# Patient Record
Sex: FEMALE | Race: WHITE | NOT HISPANIC OR LATINO | Employment: OTHER | URBAN - METROPOLITAN AREA
[De-identification: names, ages, dates, MRNs, and addresses within clinical notes are randomized per-mention and may not be internally consistent; named-entity substitution may affect disease eponyms.]

---

## 2017-01-13 ENCOUNTER — TRANSCRIBE ORDERS (OUTPATIENT)
Dept: ADMINISTRATIVE | Facility: HOSPITAL | Age: 82
End: 2017-01-13

## 2017-01-13 ENCOUNTER — HOSPITAL ENCOUNTER (OUTPATIENT)
Dept: GENERAL RADIOLOGY | Facility: HOSPITAL | Age: 82
Discharge: HOME | End: 2017-01-13
Attending: INTERNAL MEDICINE | Admitting: INTERNAL MEDICINE

## 2017-01-13 DIAGNOSIS — R05.9 COUGH: Primary | ICD-10-CM

## 2017-01-13 DIAGNOSIS — R05.9 COUGH: ICD-10-CM

## 2017-01-13 PROCEDURE — 71020 HC CHEST PA AND LATERAL: CPT

## 2017-01-19 ENCOUNTER — TRANSCRIBE ORDERS (OUTPATIENT)
Dept: ADMINISTRATIVE | Facility: HOSPITAL | Age: 82
End: 2017-01-19

## 2017-01-19 DIAGNOSIS — R91.1 LUNG NODULE: Primary | ICD-10-CM

## 2017-01-25 ENCOUNTER — APPOINTMENT (OUTPATIENT)
Dept: CT IMAGING | Facility: HOSPITAL | Age: 82
End: 2017-01-25
Attending: INTERNAL MEDICINE

## 2017-02-15 ENCOUNTER — HOSPITAL ENCOUNTER (OUTPATIENT)
Dept: CT IMAGING | Facility: HOSPITAL | Age: 82
Discharge: HOME | End: 2017-02-15
Attending: INTERNAL MEDICINE | Admitting: INTERNAL MEDICINE

## 2017-02-15 DIAGNOSIS — R91.1 LUNG NODULE: ICD-10-CM

## 2017-02-15 PROCEDURE — 71270 CT THORAX DX C-/C+: CPT

## 2017-02-15 PROCEDURE — 82565 ASSAY OF CREATININE: CPT

## 2017-02-15 PROCEDURE — 0 IOPAMIDOL 61 % SOLUTION: Performed by: INTERNAL MEDICINE

## 2017-02-15 RX ADMIN — IOPAMIDOL 75 ML: 612 INJECTION, SOLUTION INTRAVENOUS at 13:50

## 2017-02-16 LAB — CREAT BLDA-MCNC: 0.6 MG/DL (ref 0.6–1.3)

## 2018-04-20 ENCOUNTER — APPOINTMENT (OUTPATIENT)
Dept: GENERAL RADIOLOGY | Facility: HOSPITAL | Age: 83
End: 2018-04-20

## 2018-04-20 ENCOUNTER — HOSPITAL ENCOUNTER (EMERGENCY)
Facility: HOSPITAL | Age: 83
Discharge: HOME | End: 2018-04-20
Attending: EMERGENCY MEDICINE | Admitting: EMERGENCY MEDICINE

## 2018-04-20 VITALS
RESPIRATION RATE: 18 BRPM | HEART RATE: 75 BPM | TEMPERATURE: 97.7 F | BODY MASS INDEX: 18.78 KG/M2 | SYSTOLIC BLOOD PRESSURE: 160 MMHG | WEIGHT: 110 LBS | HEIGHT: 64 IN | DIASTOLIC BLOOD PRESSURE: 86 MMHG | OXYGEN SATURATION: 100 %

## 2018-04-20 DIAGNOSIS — R10.10 PAIN OF UPPER ABDOMEN: Primary | ICD-10-CM

## 2018-04-20 DIAGNOSIS — R10.13 DYSPEPSIA: ICD-10-CM

## 2018-04-20 LAB
ALBUMIN SERPL-MCNC: 4.1 G/DL (ref 3.2–4.8)
ALBUMIN/GLOB SERPL: 1.4 G/DL (ref 1.5–2.5)
ALP SERPL-CCNC: 61 U/L (ref 25–100)
ALT SERPL W P-5'-P-CCNC: 11 U/L (ref 7–40)
ANION GAP SERPL CALCULATED.3IONS-SCNC: 6 MMOL/L (ref 3–11)
AST SERPL-CCNC: 21 U/L (ref 0–33)
BASOPHILS # BLD AUTO: 0.04 10*3/MM3 (ref 0–0.2)
BASOPHILS NFR BLD AUTO: 0.5 % (ref 0–1)
BILIRUB SERPL-MCNC: 0.5 MG/DL (ref 0.3–1.2)
BNP SERPL-MCNC: 76 PG/ML (ref 0–100)
BUN BLD-MCNC: 17 MG/DL (ref 9–23)
BUN/CREAT SERPL: 21.3 (ref 7–25)
CALCIUM SPEC-SCNC: 9 MG/DL (ref 8.7–10.4)
CHLORIDE SERPL-SCNC: 96 MMOL/L (ref 99–109)
CO2 SERPL-SCNC: 28 MMOL/L (ref 20–31)
CREAT BLD-MCNC: 0.8 MG/DL (ref 0.6–1.3)
DEPRECATED RDW RBC AUTO: 45.4 FL (ref 37–54)
DEVELOPER EXPIRATION DATE: NORMAL
DEVELOPER LOT NUMBER: NORMAL
EOSINOPHIL # BLD AUTO: 0.39 10*3/MM3 (ref 0–0.3)
EOSINOPHIL NFR BLD AUTO: 4.9 % (ref 0–3)
ERYTHROCYTE [DISTWIDTH] IN BLOOD BY AUTOMATED COUNT: 13.5 % (ref 11.3–14.5)
EXPIRATION DATE: NORMAL
FECAL OCCULT BLOOD SCREEN, POC: NEGATIVE
GFR SERPL CREATININE-BSD FRML MDRD: 67 ML/MIN/1.73
GLOBULIN UR ELPH-MCNC: 3 GM/DL
GLUCOSE BLD-MCNC: 100 MG/DL (ref 70–100)
HCT VFR BLD AUTO: 39.6 % (ref 34.5–44)
HGB BLD-MCNC: 13.2 G/DL (ref 11.5–15.5)
HOLD SPECIMEN: NORMAL
HOLD SPECIMEN: NORMAL
IMM GRANULOCYTES # BLD: 0.03 10*3/MM3 (ref 0–0.03)
IMM GRANULOCYTES NFR BLD: 0.4 % (ref 0–0.6)
LIPASE SERPL-CCNC: 28 U/L (ref 6–51)
LYMPHOCYTES # BLD AUTO: 2.3 10*3/MM3 (ref 0.6–4.8)
LYMPHOCYTES NFR BLD AUTO: 28.9 % (ref 24–44)
Lab: NORMAL
MCH RBC QN AUTO: 30.7 PG (ref 27–31)
MCHC RBC AUTO-ENTMCNC: 33.3 G/DL (ref 32–36)
MCV RBC AUTO: 92.1 FL (ref 80–99)
MONOCYTES # BLD AUTO: 0.9 10*3/MM3 (ref 0–1)
MONOCYTES NFR BLD AUTO: 11.3 % (ref 0–12)
NEGATIVE CONTROL: NEGATIVE
NEUTROPHILS # BLD AUTO: 4.32 10*3/MM3 (ref 1.5–8.3)
NEUTROPHILS NFR BLD AUTO: 54.4 % (ref 41–71)
PLATELET # BLD AUTO: 235 10*3/MM3 (ref 150–450)
PMV BLD AUTO: 10 FL (ref 6–12)
POSITIVE CONTROL: POSITIVE
POTASSIUM BLD-SCNC: 4 MMOL/L (ref 3.5–5.5)
PROT SERPL-MCNC: 7.1 G/DL (ref 5.7–8.2)
RBC # BLD AUTO: 4.3 10*6/MM3 (ref 3.89–5.14)
SODIUM BLD-SCNC: 130 MMOL/L (ref 132–146)
TROPONIN I SERPL-MCNC: 0 NG/ML (ref 0–0.07)
WBC NRBC COR # BLD: 7.95 10*3/MM3 (ref 3.5–10.8)
WHOLE BLOOD HOLD SPECIMEN: NORMAL
WHOLE BLOOD HOLD SPECIMEN: NORMAL

## 2018-04-20 PROCEDURE — 85025 COMPLETE CBC W/AUTO DIFF WBC: CPT

## 2018-04-20 PROCEDURE — 82270 OCCULT BLOOD FECES: CPT | Performed by: EMERGENCY MEDICINE

## 2018-04-20 PROCEDURE — 93005 ELECTROCARDIOGRAM TRACING: CPT

## 2018-04-20 PROCEDURE — 71045 X-RAY EXAM CHEST 1 VIEW: CPT

## 2018-04-20 PROCEDURE — 83880 ASSAY OF NATRIURETIC PEPTIDE: CPT

## 2018-04-20 PROCEDURE — 84484 ASSAY OF TROPONIN QUANT: CPT

## 2018-04-20 PROCEDURE — 80053 COMPREHEN METABOLIC PANEL: CPT

## 2018-04-20 PROCEDURE — 93005 ELECTROCARDIOGRAM TRACING: CPT | Performed by: EMERGENCY MEDICINE

## 2018-04-20 PROCEDURE — 83690 ASSAY OF LIPASE: CPT

## 2018-04-20 PROCEDURE — 99284 EMERGENCY DEPT VISIT MOD MDM: CPT

## 2018-04-20 RX ORDER — OMEPRAZOLE 20 MG/1
20 CAPSULE, DELAYED RELEASE ORAL DAILY
COMMUNITY
End: 2019-03-27

## 2018-04-20 RX ORDER — ASPIRIN 81 MG/1
324 TABLET, CHEWABLE ORAL ONCE
Status: DISCONTINUED | OUTPATIENT
Start: 2018-04-20 | End: 2018-04-20

## 2018-04-20 RX ORDER — SODIUM CHLORIDE 0.9 % (FLUSH) 0.9 %
10 SYRINGE (ML) INJECTION AS NEEDED
Status: DISCONTINUED | OUTPATIENT
Start: 2018-04-20 | End: 2018-04-20 | Stop reason: HOSPADM

## 2018-04-20 RX ORDER — SUCRALFATE ORAL 1 G/10ML
1 SUSPENSION ORAL
Qty: 420 ML | Refills: 0 | Status: SHIPPED | OUTPATIENT
Start: 2018-04-20 | End: 2019-03-27

## 2018-04-20 RX ORDER — MELOXICAM 7.5 MG/1
7.5 TABLET ORAL 2 TIMES DAILY
COMMUNITY
End: 2018-04-20

## 2018-04-20 RX ORDER — GABAPENTIN 100 MG/1
200 CAPSULE ORAL NIGHTLY
COMMUNITY
End: 2019-03-05 | Stop reason: SDUPTHER

## 2018-04-20 RX ORDER — ONDANSETRON 4 MG/1
4 TABLET, ORALLY DISINTEGRATING ORAL EVERY 8 HOURS PRN
Qty: 12 TABLET | Refills: 0 | Status: SHIPPED | OUTPATIENT
Start: 2018-04-20 | End: 2019-03-27

## 2018-04-20 NOTE — ED PROVIDER NOTES
"Subjective   Darline Reed is a 89 y.o.female who presents to the emergency department with complaints of chest pain. Her last heart catheterization was \"years ago\" but was reportedly normal. Her cardiologist is Dr. Lakhani.     The patient states that she started feeling nauseated about an hour after eating breakfast at 0730 this morning. She vomited twice and since that time has had chest pain, epigastric abdominal pain, and intermittent episodes of nausea. The patient has a history of peptic ulcers and was seen by her PCP last week for abdominal issues and dark stools. She is not taking iron supplements or Pepto bismol.     There are no other acute complaints at this time.         History provided by:  Patient and relative  Chest Pain   Pain location:  Unable to specify  Pain quality: aching    Pain radiates to:  Does not radiate  Pain severity:  Unable to specify  Onset quality:  Unable to specify  Duration:  1 day  Timing:  Constant  Progression:  Unchanged  Chronicity:  New  Relieved by:  None tried  Worsened by:  Nothing  Ineffective treatments:  None tried  Associated symptoms: abdominal pain, nausea and vomiting    Abdominal pain:     Location:  Epigastric    Duration:  1 day    Timing:  Unable to specify    Progression:  Unable to specify    Chronicity:  New  Nausea:     Severity:  Unable to specify    Onset quality:  Unable to specify    Duration:  1 day    Timing:  Intermittent    Progression:  Unchanged  Vomiting:     Number of occurrences:  2    Severity:  Unable to specify    Duration:  1 day    Timing:  Intermittent    Progression:  Resolved  Risk factors: high cholesterol and hypertension    Risk factors: no smoking        Review of Systems   Cardiovascular: Positive for chest pain.   Gastrointestinal: Positive for abdominal pain, nausea and vomiting.   All other systems reviewed and are negative.      Past Medical History:   Diagnosis Date   • Arthritis    • Asthma    • COPD (chronic obstructive " pulmonary disease)    • Diverticulitis    • Dyspnea    • GERD (gastroesophageal reflux disease)    • Hypercholesteremia    • Hypertension 6/22/2016   • Hypothyroid    • Osteoporosis    • Scoliosis        Allergies   Allergen Reactions   • Codeine    • Penicillins        Past Surgical History:   Procedure Laterality Date   • CATARACT EXTRACTION     • COSMETIC SURGERY     • TONSILLECTOMY     • TONSILLECTOMY AND ADENOIDECTOMY         Family History   Problem Relation Age of Onset   • Coronary artery disease Mother    • Arthritis Mother    • Diabetes Mother    • Hyperlipidemia Mother    • Cardiomyopathy Son        Social History     Social History   • Marital status:      Social History Main Topics   • Smoking status: Never Smoker   • Smokeless tobacco: Never Used   • Alcohol use No   • Drug use: No   • Sexual activity: No      Comment:      Other Topics Concern   • Not on file         Objective   Physical Exam   Constitutional: She is oriented to person, place, and time. She appears well-developed and well-nourished. No distress.   HENT:   Head: Normocephalic and atraumatic.   Eyes: Conjunctivae are normal. No scleral icterus.   Neck: Normal range of motion. Neck supple.   Cardiovascular: Normal rate, regular rhythm and normal heart sounds.    No murmur heard.  Pulmonary/Chest: Effort normal and breath sounds normal. No respiratory distress.   Abdominal: Soft. Bowel sounds are normal. There is tenderness in the epigastric area and left upper quadrant. There is no rebound and no guarding.   Minimal tenderness in the LUQ and epigastric region.   Genitourinary: Rectal exam shows guaiac negative stool.   Genitourinary Comments: Brown colored guaiac negative stool in the rectal vault.   Musculoskeletal: She exhibits no edema.   Neurological: She is alert and oriented to person, place, and time.   Skin: Skin is warm and dry. No erythema.   Psychiatric: She has a normal mood and affect. Her behavior is normal.    Nursing note and vitals reviewed.      Procedures         ED Course  ED Course     Recent Results (from the past 24 hour(s))   Comprehensive Metabolic Panel    Collection Time: 04/20/18  3:35 PM   Result Value Ref Range    Glucose 100 70 - 100 mg/dL    BUN 17 9 - 23 mg/dL    Creatinine 0.80 0.60 - 1.30 mg/dL    Sodium 130 (L) 132 - 146 mmol/L    Potassium 4.0 3.5 - 5.5 mmol/L    Chloride 96 (L) 99 - 109 mmol/L    CO2 28.0 20.0 - 31.0 mmol/L    Calcium 9.0 8.7 - 10.4 mg/dL    Total Protein 7.1 5.7 - 8.2 g/dL    Albumin 4.10 3.20 - 4.80 g/dL    ALT (SGPT) 11 7 - 40 U/L    AST (SGOT) 21 0 - 33 U/L    Alkaline Phosphatase 61 25 - 100 U/L    Total Bilirubin 0.5 0.3 - 1.2 mg/dL    eGFR Non African Amer 67 >60 mL/min/1.73    Globulin 3.0 gm/dL    A/G Ratio 1.4 (L) 1.5 - 2.5 g/dL    BUN/Creatinine Ratio 21.3 7.0 - 25.0    Anion Gap 6.0 3.0 - 11.0 mmol/L   Lipase    Collection Time: 04/20/18  3:35 PM   Result Value Ref Range    Lipase 28 6 - 51 U/L   BNP    Collection Time: 04/20/18  3:35 PM   Result Value Ref Range    BNP 76.0 0.0 - 100.0 pg/mL   Light Blue Top    Collection Time: 04/20/18  3:35 PM   Result Value Ref Range    Extra Tube hold for add-on    Green Top (Gel)    Collection Time: 04/20/18  3:35 PM   Result Value Ref Range    Extra Tube Hold for add-ons.    Lavender Top    Collection Time: 04/20/18  3:35 PM   Result Value Ref Range    Extra Tube hold for add-on    Gold Top - SST    Collection Time: 04/20/18  3:35 PM   Result Value Ref Range    Extra Tube Hold for add-ons.    CBC Auto Differential    Collection Time: 04/20/18  3:35 PM   Result Value Ref Range    WBC 7.95 3.50 - 10.80 10*3/mm3    RBC 4.30 3.89 - 5.14 10*6/mm3    Hemoglobin 13.2 11.5 - 15.5 g/dL    Hematocrit 39.6 34.5 - 44.0 %    MCV 92.1 80.0 - 99.0 fL    MCH 30.7 27.0 - 31.0 pg    MCHC 33.3 32.0 - 36.0 g/dL    RDW 13.5 11.3 - 14.5 %    RDW-SD 45.4 37.0 - 54.0 fl    MPV 10.0 6.0 - 12.0 fL    Platelets 235 150 - 450 10*3/mm3    Neutrophil % 54.4  41.0 - 71.0 %    Lymphocyte % 28.9 24.0 - 44.0 %    Monocyte % 11.3 0.0 - 12.0 %    Eosinophil % 4.9 (H) 0.0 - 3.0 %    Basophil % 0.5 0.0 - 1.0 %    Immature Grans % 0.4 0.0 - 0.6 %    Neutrophils, Absolute 4.32 1.50 - 8.30 10*3/mm3    Lymphocytes, Absolute 2.30 0.60 - 4.80 10*3/mm3    Monocytes, Absolute 0.90 0.00 - 1.00 10*3/mm3    Eosinophils, Absolute 0.39 (H) 0.00 - 0.30 10*3/mm3    Basophils, Absolute 0.04 0.00 - 0.20 10*3/mm3    Immature Grans, Absolute 0.03 0.00 - 0.03 10*3/mm3   POC Troponin, Rapid    Collection Time: 04/20/18  3:38 PM   Result Value Ref Range    Troponin I 0.00 0.00 - 0.07 ng/mL   POCT Occult Blood, stool    Collection Time: 04/20/18  7:09 PM   Result Value Ref Range    Fecal Occult Blood Negative Negative    Lot Number [id number]     Expiration Date 3/20     DEVELOPER LOT NUMBER [id number]     DEVELOPER EXPIRATION DATE 5/2,020     Positive Control Positive Positive    Negative Control Negative Negative     Note: In addition to lab results from this visit, the labs listed above may include labs taken at another facility or during a different encounter within the last 24 hours. Please correlate lab times with ED admission and discharge times for further clarification of the services performed during this visit.    XR Chest 1 View   Preliminary Result   Chronic and emphysematous changes seen within the lung   fields with no evidence of acute parenchymal disease.       DICTATED:     04/20/2018   EDITED/ls :     04/20/2018                 Vitals:    04/20/18 1731 04/20/18 1813 04/20/18 1844 04/20/18 1927   BP: 160/86 160/86 155/68 160/86   BP Location:       Patient Position:       Pulse: 77 74 75 75   Resp:    18   Temp:       TempSrc:       SpO2: 100% 100% 98% 100%   Weight:       Height:         Medications - No data to display  ECG/EMG Results (last 24 hours)     Procedure Component Value Units Date/Time    ECG 12 Lead [[id number]] Collected:  04/20/18 1533     Updated:  04/20/18 1837                        MDM  Number of Diagnoses or Management Options  Dyspepsia: new and requires workup  Pain of upper abdomen: new and requires workup     Amount and/or Complexity of Data Reviewed  Clinical lab tests: reviewed and ordered  Tests in the radiology section of CPT®: reviewed and ordered  Tests in the medicine section of CPT®: ordered and reviewed  Discuss the patient with other providers: yes    Patient Progress  Patient progress: stable      Final diagnoses:   Pain of upper abdomen   Dyspepsia       Documentation assistance provided by eugene Espinosa.  Information recorded by the scribe was done at my direction and has been verified and validated by me.     Jaclyn Espinosa  04/20/18 1915       MESFIN Barnes  04/21/18 0797

## 2019-03-04 RX ORDER — GABAPENTIN 100 MG/1
CAPSULE ORAL
Qty: 180 CAPSULE | Refills: 1 | OUTPATIENT
Start: 2019-03-04

## 2019-03-05 RX ORDER — GABAPENTIN 100 MG/1
200 CAPSULE ORAL NIGHTLY
Qty: 44 CAPSULE | Refills: 0 | Status: SHIPPED | OUTPATIENT
Start: 2019-03-05 | End: 2019-03-27 | Stop reason: SDUPTHER

## 2019-03-05 NOTE — TELEPHONE ENCOUNTER
Last seen:7/26/18  Last filled:7/26/18  Next appt:3/27/19  JAIRO:pending    Her daughter called stating that her mother will be out of gabapentin by tomorrow and wanted to know if you would prescribe her enough to get her by until her appt that is on 3/27/19 so I have just put enough in for that  22 days x 2   44 pills

## 2019-03-06 RX ORDER — MELOXICAM 7.5 MG/1
TABLET ORAL
Qty: 90 TABLET | Refills: 0 | Status: SHIPPED | OUTPATIENT
Start: 2019-03-06 | End: 2019-03-27 | Stop reason: SDUPTHER

## 2019-03-19 PROBLEM — R49.0 HOARSENESS: Status: ACTIVE | Noted: 2019-03-19

## 2019-03-19 PROBLEM — K57.30 DIVERTICULOSIS OF COLON WITHOUT HEMORRHAGE: Status: ACTIVE | Noted: 2019-03-19

## 2019-03-19 PROBLEM — R06.09 DYSPNEA ON EXERTION: Status: ACTIVE | Noted: 2019-03-19

## 2019-03-19 PROBLEM — G44.311 INTRACTABLE ACUTE POST-TRAUMATIC HEADACHE: Status: ACTIVE | Noted: 2019-03-19

## 2019-03-19 PROBLEM — K59.01 CONSTIPATION BY DELAYED COLONIC TRANSIT: Status: ACTIVE | Noted: 2019-03-19

## 2019-03-19 PROBLEM — G43.909 MIGRAINE: Status: ACTIVE | Noted: 2019-03-19

## 2019-03-19 PROBLEM — M25.569 PAIN, KNEE: Status: ACTIVE | Noted: 2019-03-19

## 2019-03-19 PROBLEM — N39.41 URGE INCONTINENCE OF URINE: Status: ACTIVE | Noted: 2019-03-19

## 2019-03-19 PROBLEM — R64 CACHEXIA (HCC): Status: ACTIVE | Noted: 2019-03-19

## 2019-03-19 PROBLEM — R41.3 MEMORY LOSS: Status: ACTIVE | Noted: 2019-03-19

## 2019-03-19 PROBLEM — M81.0 SENILE OSTEOPOROSIS: Status: ACTIVE | Noted: 2019-03-19

## 2019-03-19 PROBLEM — M54.6 CHRONIC BILATERAL THORACIC BACK PAIN: Status: ACTIVE | Noted: 2019-03-19

## 2019-03-19 PROBLEM — E03.9 HYPOTHYROIDISM (ACQUIRED): Status: ACTIVE | Noted: 2019-03-19

## 2019-03-19 PROBLEM — G89.29 CHRONIC BILATERAL LOW BACK PAIN WITH LEFT-SIDED SCIATICA: Status: ACTIVE | Noted: 2019-03-19

## 2019-03-19 PROBLEM — M41.20 SCOLIOSIS (AND KYPHOSCOLIOSIS), IDIOPATHIC: Status: ACTIVE | Noted: 2019-03-19

## 2019-03-19 PROBLEM — G89.29 CHRONIC BILATERAL THORACIC BACK PAIN: Status: ACTIVE | Noted: 2019-03-19

## 2019-03-19 PROBLEM — I10 BENIGN ESSENTIAL HYPERTENSION: Status: ACTIVE | Noted: 2019-03-19

## 2019-03-19 PROBLEM — K21.9 GASTROESOPHAGEAL REFLUX DISEASE WITHOUT ESOPHAGITIS: Status: ACTIVE | Noted: 2019-03-19

## 2019-03-19 PROBLEM — E78.00 HYPERCHOLESTEREMIA: Status: ACTIVE | Noted: 2019-03-19

## 2019-03-19 PROBLEM — R29.6 FALLS FREQUENTLY: Status: ACTIVE | Noted: 2019-03-19

## 2019-03-19 PROBLEM — Z91.81 AT HIGH RISK FOR FALLS: Status: ACTIVE | Noted: 2019-03-19

## 2019-03-19 PROBLEM — J44.9 COPD (CHRONIC OBSTRUCTIVE PULMONARY DISEASE) WITH CHRONIC BRONCHITIS (HCC): Status: ACTIVE | Noted: 2019-03-19

## 2019-03-19 PROBLEM — M54.42 CHRONIC BILATERAL LOW BACK PAIN WITH LEFT-SIDED SCIATICA: Status: ACTIVE | Noted: 2019-03-19

## 2019-03-19 PROBLEM — F32.0 MILD MAJOR DEPRESSION, SINGLE EPISODE (HCC): Status: ACTIVE | Noted: 2019-03-19

## 2019-03-27 ENCOUNTER — OFFICE VISIT (OUTPATIENT)
Dept: INTERNAL MEDICINE | Facility: CLINIC | Age: 84
End: 2019-03-27

## 2019-03-27 VITALS
DIASTOLIC BLOOD PRESSURE: 68 MMHG | HEIGHT: 64 IN | SYSTOLIC BLOOD PRESSURE: 110 MMHG | HEART RATE: 60 BPM | BODY MASS INDEX: 19.46 KG/M2 | WEIGHT: 114 LBS

## 2019-03-27 DIAGNOSIS — J44.9 COPD (CHRONIC OBSTRUCTIVE PULMONARY DISEASE) WITH CHRONIC BRONCHITIS (HCC): ICD-10-CM

## 2019-03-27 DIAGNOSIS — E55.9 VITAMIN D DEFICIENCY: ICD-10-CM

## 2019-03-27 DIAGNOSIS — I10 BENIGN ESSENTIAL HYPERTENSION: ICD-10-CM

## 2019-03-27 DIAGNOSIS — E03.9 HYPOTHYROIDISM (ACQUIRED): ICD-10-CM

## 2019-03-27 DIAGNOSIS — Z91.81 AT HIGH RISK FOR FALLS: ICD-10-CM

## 2019-03-27 DIAGNOSIS — Z00.00 MEDICARE ANNUAL WELLNESS VISIT, SUBSEQUENT: Primary | ICD-10-CM

## 2019-03-27 DIAGNOSIS — R29.6 FALLS FREQUENTLY: ICD-10-CM

## 2019-03-27 DIAGNOSIS — R53.1 GENERALIZED WEAKNESS: ICD-10-CM

## 2019-03-27 DIAGNOSIS — E78.00 HYPERCHOLESTEREMIA: ICD-10-CM

## 2019-03-27 DIAGNOSIS — G89.29 CHRONIC RIGHT-SIDED LOW BACK PAIN WITH RIGHT-SIDED SCIATICA: Chronic | ICD-10-CM

## 2019-03-27 DIAGNOSIS — R41.3 MEMORY LOSS: ICD-10-CM

## 2019-03-27 DIAGNOSIS — F32.0 MILD MAJOR DEPRESSION, SINGLE EPISODE (HCC): ICD-10-CM

## 2019-03-27 DIAGNOSIS — K59.01 CONSTIPATION BY DELAYED COLONIC TRANSIT: ICD-10-CM

## 2019-03-27 DIAGNOSIS — M54.41 CHRONIC RIGHT-SIDED LOW BACK PAIN WITH RIGHT-SIDED SCIATICA: Chronic | ICD-10-CM

## 2019-03-27 PROBLEM — R64 CACHEXIA (HCC): Status: RESOLVED | Noted: 2019-03-19 | Resolved: 2019-03-27

## 2019-03-27 PROCEDURE — G0439 PPPS, SUBSEQ VISIT: HCPCS | Performed by: INTERNAL MEDICINE

## 2019-03-27 RX ORDER — LEVOTHYROXINE SODIUM 0.1 MG/1
100 TABLET ORAL DAILY
Qty: 90 TABLET | Refills: 1 | Status: SHIPPED | OUTPATIENT
Start: 2019-03-27 | End: 2019-10-11 | Stop reason: SDUPTHER

## 2019-03-27 RX ORDER — MV-MIN/FA/VIT K/LUTEIN/ZEAXANT 200MCG-5MG
CAPSULE ORAL
COMMUNITY
Start: 2018-07-26

## 2019-03-27 RX ORDER — ACETAMINOPHEN 160 MG
TABLET,DISINTEGRATING ORAL
COMMUNITY
Start: 2011-10-04

## 2019-03-27 RX ORDER — DONEPEZIL HYDROCHLORIDE 5 MG/1
5 TABLET, FILM COATED ORAL NIGHTLY
Qty: 90 TABLET | Refills: 1 | Status: SHIPPED | OUTPATIENT
Start: 2019-03-27 | End: 2019-10-11 | Stop reason: SDUPTHER

## 2019-03-27 RX ORDER — CALCIUM CARBONATE 260MG(650)
TABLET,CHEWABLE ORAL
COMMUNITY
Start: 2018-07-26

## 2019-03-27 RX ORDER — MELOXICAM 7.5 MG/1
7.5 TABLET ORAL DAILY
Qty: 90 TABLET | Refills: 1 | Status: SHIPPED | OUTPATIENT
Start: 2019-03-27 | End: 2019-10-11 | Stop reason: SDUPTHER

## 2019-03-27 RX ORDER — SOLIFENACIN SUCCINATE 10 MG/1
20 TABLET, FILM COATED ORAL DAILY
Qty: 180 TABLET | Refills: 1 | Status: SHIPPED | OUTPATIENT
Start: 2019-03-27 | End: 2019-10-11 | Stop reason: SDUPTHER

## 2019-03-27 RX ORDER — OMEPRAZOLE 40 MG/1
40 CAPSULE, DELAYED RELEASE ORAL DAILY
Qty: 90 CAPSULE | Refills: 1 | Status: SHIPPED | OUTPATIENT
Start: 2019-03-27 | End: 2019-09-23 | Stop reason: SDUPTHER

## 2019-03-27 RX ORDER — GABAPENTIN 100 MG/1
200 CAPSULE ORAL NIGHTLY
Qty: 60 CAPSULE | Refills: 2 | Status: SHIPPED | OUTPATIENT
Start: 2019-03-27 | End: 2019-07-31 | Stop reason: SDUPTHER

## 2019-03-27 RX ORDER — OMEPRAZOLE 40 MG/1
CAPSULE, DELAYED RELEASE ORAL
COMMUNITY
Start: 2018-12-29 | End: 2019-03-27 | Stop reason: SDUPTHER

## 2019-03-27 RX ORDER — DONEPEZIL HYDROCHLORIDE 5 MG/1
TABLET, FILM COATED ORAL
COMMUNITY
Start: 2019-02-22 | End: 2019-03-27 | Stop reason: SDUPTHER

## 2019-03-27 RX ORDER — DOCUSATE SODIUM 250 MG
2 CAPSULE ORAL DAILY
COMMUNITY
Start: 2012-11-26

## 2019-03-27 RX ORDER — SOLIFENACIN SUCCINATE 10 MG/1
2 TABLET, FILM COATED ORAL DAILY
COMMUNITY
Start: 2019-01-09 | End: 2019-03-27 | Stop reason: SDUPTHER

## 2019-03-27 NOTE — PROGRESS NOTES
QUICK REFERENCE INFORMATION:  The ABCs of the Annual Wellness Visit    Subsequent Medicare Wellness Visit    HEALTH RISK ASSESSMENT    2/8/1926    Recent Hospitalizations:  No hospitalization(s) within the last year..        Current Medical Providers:  Patient Care Team:  Sandra Adkins MD as PCP - General  Sandra Adkins MD as PCP - Family Medicine  Rob Woods MD as Consulting Physician (Orthopedic Surgery)        Smoking Status:  Social History     Tobacco Use   Smoking Status Never Smoker   Smokeless Tobacco Never Used       Alcohol Consumption:  Social History     Substance and Sexual Activity   Alcohol Use No       Depression Screen:   PHQ-2/PHQ-9 Depression Screening 3/27/2019   Little interest or pleasure in doing things 1   Feeling down, depressed, or hopeless 1   Trouble falling or staying asleep, or sleeping too much 1   Feeling tired or having little energy 1   Poor appetite or overeating 0   Feeling bad about yourself - or that you are a failure or have let yourself or your family down 1   Trouble concentrating on things, such as reading the newspaper or watching television 2   Moving or speaking so slowly that other people could have noticed. Or the opposite - being so fidgety or restless that you have been moving around a lot more than usual 0   Thoughts that you would be better off dead, or of hurting yourself in some way 0   Total Score 7   If you checked off any problems, how difficult have these problems made it for you to do your work, take care of things at home, or get along with other people? Somewhat difficult       Health Habits and Functional and Cognitive Screening:  Functional & Cognitive Status 3/27/2019   Do you have difficulty preparing food and eating? No   Do you have difficulty bathing yourself, getting dressed or grooming yourself? No   Do you have difficulty using the toilet? No   Do you have difficulty moving around from place to place? No   Do you have trouble with  steps or getting out of a bed or a chair? Yes   In the past year have you fallen or experienced a near fall? Yes   Current Diet Well Balanced Diet   Dental Exam Up to date   Eye Exam Not up to date   Exercise (times per week) 0 times per week   Current Exercise Activities Include None   Do you need help using the phone?  No   Are you deaf or do you have serious difficulty hearing?  No   Do you need help with transportation? Yes   Do you need help shopping? Yes   Do you need help preparing meals?  No   Do you need help with housework?  Yes   Do you need help with laundry? No   Do you need help taking your medications? No   Do you need help managing money? No   Do you ever drive or ride in a car without wearing a seat belt? No   Have you felt unusual stress, anger or loneliness in the last month? Yes   Who do you live with? Alone   If you need help, do you have trouble finding someone available to you? No   Have you been bothered in the last four weeks by sexual problems? No           Does the patient have evidence of cognitive impairment? Yes    Aspirin use counseling: Contraindicated from taking ASA      Recent Lab Results:  CMP:  Lab Results   Component Value Date    BUN 17 04/20/2018    CREATININE 0.80 04/20/2018    EGFRIFNONA 67 04/20/2018    BCR 21.3 04/20/2018     (L) 04/20/2018    K 4.0 04/20/2018    CO2 28.0 04/20/2018    CALCIUM 9.0 04/20/2018    ALBUMIN 4.10 04/20/2018    BILITOT 0.5 04/20/2018    ALKPHOS 61 04/20/2018    AST 21 04/20/2018    ALT 11 04/20/2018     HbA1c:  No results found for: HGBA1C  Microalbumin:  No results found for: MICROALBUR, POCMALB, POCCREAT  Lipid Panel  Lab Results   Component Value Date    AST 21 04/20/2018    ALT 11 04/20/2018       Visual Acuity:  No exam data present    Age-appropriate Screening Schedule:  Refer to the list below for future screening recommendations based on patient's age, sex and/or medical conditions. Orders for these recommended tests are listed in  the plan section. The patient has been provided with a written plan.    89 y.o.  Age appropriate preventive counseling done including age appropriate vaccines,regular  and self breast exam,  regular dental visits, mental health, injury prevention such as wearing seat belt and preventing falls, healthy  nutrition, healthy weight, regular physical exercise. Alcohol use is none.  Tobacco history-none. Drug use-none.  STD's-not at risk.        Health Maintenance   Topic Date Due   • ZOSTER VACCINE (1 of 2) 02/08/1976   • PNEUMOCOCCAL VACCINES (65+ LOW/MEDIUM RISK) (2 of 2 - PCV13) 05/02/2015   • DXA SCAN  04/20/2018   • INFLUENZA VACCINE  08/01/2018   • LIPID PANEL  03/05/2019   • TDAP/TD VACCINES (2 - Td) 11/26/2022        Subjective   History of Present Illness    Darline Reed is a 89 y.o. female who presents for an Subsequent Wellness Visit and follow-up of chronic conditions including hypertension, hyperlipidemia, COPD, depression, memory loss, hypothyroidism, constipation.    CHRONIC CONDITIONS:    Blood pressures have been controlled at home.  Her daughter who is an RN watches her blood pressure.  It is very good today.    She does eat a low-fat diet.  Her appetite has been better over the last few months.  She eats 3 meals a day with some protein in each 1.  She does not exercise.  She is sedentary.    COPD is stable.  She uses Tl B inhaler regularly and feels that it has helped her a lot.  No cough or wheeze.  No increase in her baseline shortness of breath with exertion.    Depression is mild.  She does not think it is any worse than usual.  She feels the sertraline has helped her a lot.  She does feel down when she thinks about her inability to get out and drive and go places and when she thinks about her lack of ability to read books which she used to love to do.  She can no longer remember the story line to read a book and her site is declining as well..  She does read short stories and reads the  newspaper and does crossword puzzles and she is very happy about that.  She feels very estrella for the help that she does have and her family that comes and takes her places and brings her meals and helps take care of her.    Memory loss has declined some gradually over the last year.  She does forget things that she was told.  As stated she cannot remember the story line in a book.  She does sometimes lose things in her house.  She has never left the stove on or done anything that her family feels is dangerous.  She feels safe in her house and her family feels she is safe.    She is at high risk for falls.  She has general weakness.  She is on multiple medications.  She has had one fall over the last year in her bathroom but she did not hurt herself.  Her family installed side rails for the commode and she is now able to stand up from the commode without any other assistance.  She is able to stand up from a chair without arms on her own.    Hypothyroidism, she does take her medication regularly.  Her family does monitor her medications.    Constipation is well controlled on MiraLAX and stool softeners.    The chronic right-sided low back pain with right-sided sciatica is stable.  It is much better than it used to be.  She manages it by taking gabapentin every evening and meloxicam every morning with food.  She rarely ever needs a tramadol.  It does help her when she needs it.  She denies side effects.    The following portions of the patient's history were reviewed and updated as appropriate: allergies, current medications, past family history, past medical history, past social history, past surgical history and problem list.    Outpatient Medications Prior to Visit   Medication Sig Dispense Refill   • Cholecalciferol (VITAMIN D3) 2000 units capsule 1 capsule by mouth  daily     • Cyanocobalamin 5000 MCG tablet dispersible take 1 tablet daily     • docusate sodium (COLACE) 250 MG capsule Take 2 capsules by mouth  Daily.     • Magnesium Citrate 100 MG tablet take 1 tablet every evening     • Multiple Vitamins-Minerals (PRESERVISION AREDS 2+MULTI VIT) capsule Take one capsule by oral route once a day     • polyethylene glycol (MIRALAX) packet Take 17 g by mouth 2 (two) times a day for 30 days. 1020 g 5   • Selenium 200 MCG tablet take 1 tablet daily     • traMADol (ULTRAM) 50 MG tablet Take 1 tablet by mouth every 6 (six) hours as needed for moderate pain (4-6) for up to 30 days. 90 tablet 0   • donepezil (ARICEPT) 5 MG tablet      • gabapentin (NEURONTIN) 100 MG capsule Take 2 capsules by mouth Every Night. 44 capsule 0   • Glycopyrrolate-Formoterol (BEVESPI AEROSPHERE) 9-4.8 MCG/ACT aerosol Inhale.     • levothyroxine (SYNTHROID, LEVOTHROID) 100 MCG tablet Take 1 tablet by mouth daily.     • meloxicam (MOBIC) 7.5 MG tablet TAKE 1 TABLET DAILY 90 tablet 0   • omeprazole (priLOSEC) 40 MG capsule      • sertraline (ZOLOFT) 50 MG tablet Take 0.5 tablets by mouth daily.     • VESICARE 10 MG tablet Take 2 tablets by mouth Daily.     • azithromycin (ZITHROMAX Z-LAURO) 250 MG tablet Take 2 tablets the first day, then 1 tablet daily for 4 days. 6 tablet 0   • Cholecalciferol (VITAMIN D PO) Take 1 tablet by mouth daily.     • docusate sodium (DOK) 250 MG capsule Take 1 capsule by mouth 2 (two) times a day for 30 days. 60 capsule 5   • omeprazole (priLOSEC) 20 MG capsule Take 20 mg by mouth Daily.     • ondansetron ODT (ZOFRAN-ODT) 4 MG disintegrating tablet Take 1 tablet by mouth Every 8 (Eight) Hours As Needed for Nausea. 12 tablet 0   • sucralfate (CARAFATE) 1 GM/10ML suspension Take 10 mL by mouth 4 (Four) Times a Day With Meals & at Bedtime. 420 mL 0   • vitamin B-12 (CYANOCOBALAMIN) 1000 MCG tablet Take  by mouth.       No facility-administered medications prior to visit.        Patient Active Problem List   Diagnosis   • Intractable pain   • Hypokalemia   • Low back pain radiating to right leg   • Weight loss   • Chronic bilateral  thoracic back pain   • Dyspnea on exertion   • Hoarseness   • Pain, knee   • Migraine   • Falls frequently   • Chronic right-sided low back pain with right-sided sciatica   • Senile osteoporosis   • Diverticulosis of colon without hemorrhage   • Scoliosis (and kyphoscoliosis), idiopathic   • Memory loss   • Hypercholesteremia   • Urge incontinence of urine   • Benign essential hypertension   • Constipation by delayed colonic transit   • At high risk for falls   • Intractable acute post-traumatic headache   • Mild major depression, single episode (CMS/HCC)   • COPD (chronic obstructive pulmonary disease) with chronic bronchitis (CMS/HCC)   • Gastroesophageal reflux disease without esophagitis   • Hypothyroidism (acquired)       Advance Care Planning:  Patient has an advance directive - a copy has not been provided. Have asked the patient to send this to us to add to record    Identification of Risk Factors:  Risk factors include: Osteoprorosis risk identified;  Use Calcium and Vitamin D as directed, avoid caffeine, participate in weight bearing exercises for better bone health  Depression Screen Positive;   Patient advised to followup with her primary care provider   Dementia/Memory loss suspected due to abnormal cognition screening;  Patient advised to followup with her primary care provider and physical therapy  Fall Screen Questionaire is abnormal suggesting increased fall risk;   Referral to Physical Therapy for further evaluation of gait, strength and flexibility to prevent falls ordered   Participation in an exercise program for gait training, balance training and strength  Inactivity/Poor Exercise Habits-patient reported; Inactivity; Follow Exercise Information (additional patient information in the After Visit Summary)  Inadequate Social Support, Isolation, Loneliness, Lack of Transportation, Financial Difficulties, or Caregiver Stress suspected; physical therapy.    Review of Systems   Constitutional:  "Positive for fatigue. Negative for chills and fever.   HENT: Negative for congestion, ear pain and sinus pressure.    Respiratory: Positive for shortness of breath. Negative for cough, chest tightness and wheezing.         SOA on exertion   Cardiovascular: Negative for chest pain and palpitations.   Gastrointestinal: Negative for abdominal pain, blood in stool and constipation.   Skin: Negative for color change.   Allergic/Immunologic: Negative for environmental allergies.   Neurological: Negative for dizziness, speech difficulty and headaches.   Psychiatric/Behavioral: Negative for confusion. The patient is not nervous/anxious.        Compared to one year ago, the patient feels her physical health is worse.  Compared to one year ago, the patient feels her mental health is worse.    Objective     Physical Exam     Procedures     Vitals:    03/27/19 1035   BP: 110/68   BP Location: Right arm   Patient Position: Sitting   Cuff Size: Adult   Pulse: 60   Weight: 51.7 kg (114 lb)   Height: 162 cm (63.78\")       Patient's Body mass index is 19.7 kg/m². BMI is within normal parameters. No follow-up required..      Assessment/Plan   Patient Self-Management and Personalized Health Advice  The patient has been provided with information about: diet, exercise and fall prevention and preventive services including:   · Annual Wellness Visit (AWV)  · Depression Screening (15 minutes face to face, Code ).    Visit Diagnoses:    ICD-10-CM ICD-9-CM   1. Benign essential hypertension I10 401.1   2. Hypercholesteremia E78.00 272.0   3. Memory loss R41.3 780.93   4. Chronic right-sided low back pain with right-sided sciatica M54.41 724.2    G89.29 724.3     338.29   5. Generalized weakness R53.1 780.79   6. COPD (chronic obstructive pulmonary disease) with chronic bronchitis (CMS/HCC) J44.9 491.20   7. Mild major depression, single episode (CMS/HCC) F32.0 296.21   8. At high risk for falls Z91.81 V15.88   9. Hypothyroidism " (acquired) E03.9 244.9   10. Constipation by delayed colonic transit K59.01 564.01       Orders Placed This Encounter   Procedures   • Ambulatory Referral to Physical Therapy Evaluate and treat     Referral Priority:   Routine     Referral Type:   Therapy     Referral Reason:   Specialty Services Required     Referral Location:   [facility]     Requested Specialty:   Physical Therapy     Number of Visits Requested:   1       Outpatient Encounter Medications as of 3/27/2019   Medication Sig Dispense Refill   • Cholecalciferol (VITAMIN D3) 2000 units capsule 1 capsule by mouth  daily     • Cyanocobalamin 5000 MCG tablet dispersible take 1 tablet daily     • docusate sodium (COLACE) 250 MG capsule Take 2 capsules by mouth Daily.     • donepezil (ARICEPT) 5 MG tablet Take 1 tablet by mouth Every Night. 90 tablet 1   • gabapentin (NEURONTIN) 100 MG capsule Take 2 capsules by mouth Every Night. 60 capsule 2   • Glycopyrrolate-Formoterol (BEVESPI AEROSPHERE) 9-4.8 MCG/ACT aerosol Inhale 1 spray Daily. 3 inhaler 1   • levothyroxine (SYNTHROID, LEVOTHROID) 100 MCG tablet Take 1 tablet by mouth Daily. 90 tablet 1   • Magnesium Citrate 100 MG tablet take 1 tablet every evening     • meloxicam (MOBIC) 7.5 MG tablet Take 1 tablet by mouth Daily. 90 tablet 1   • Multiple Vitamins-Minerals (PRESERVISION AREDS 2+MULTI VIT) capsule Take one capsule by oral route once a day     • omeprazole (priLOSEC) 40 MG capsule Take 1 capsule by mouth Daily. 90 capsule 1   • polyethylene glycol (MIRALAX) packet Take 17 g by mouth 2 (two) times a day for 30 days. 1020 g 5   • Selenium 200 MCG tablet take 1 tablet daily     • sertraline (ZOLOFT) 50 MG tablet Take 0.5 tablets by mouth Daily. 46 tablet 1   • traMADol (ULTRAM) 50 MG tablet Take 1 tablet by mouth every 6 (six) hours as needed for moderate pain (4-6) for up to 30 days. 90 tablet 0   • VESICARE 10 MG tablet Take 2 tablets by mouth Daily. 180 tablet 1   • [DISCONTINUED]  donepezil (ARICEPT) 5 MG tablet      • [DISCONTINUED] gabapentin (NEURONTIN) 100 MG capsule Take 2 capsules by mouth Every Night. 44 capsule 0   • [DISCONTINUED] Glycopyrrolate-Formoterol (BEVESPI AEROSPHERE) 9-4.8 MCG/ACT aerosol Inhale.     • [DISCONTINUED] levothyroxine (SYNTHROID, LEVOTHROID) 100 MCG tablet Take 1 tablet by mouth daily.     • [DISCONTINUED] meloxicam (MOBIC) 7.5 MG tablet TAKE 1 TABLET DAILY 90 tablet 0   • [DISCONTINUED] omeprazole (priLOSEC) 40 MG capsule      • [DISCONTINUED] sertraline (ZOLOFT) 50 MG tablet Take 0.5 tablets by mouth daily.     • [DISCONTINUED] VESICARE 10 MG tablet Take 2 tablets by mouth Daily.     • [DISCONTINUED] azithromycin (ZITHROMAX Z-LAURO) 250 MG tablet Take 2 tablets the first day, then 1 tablet daily for 4 days. 6 tablet 0   • [DISCONTINUED] Cholecalciferol (VITAMIN D PO) Take 1 tablet by mouth daily.     • [DISCONTINUED] docusate sodium (DOK) 250 MG capsule Take 1 capsule by mouth 2 (two) times a day for 30 days. 60 capsule 5   • [DISCONTINUED] omeprazole (priLOSEC) 20 MG capsule Take 20 mg by mouth Daily.     • [DISCONTINUED] ondansetron ODT (ZOFRAN-ODT) 4 MG disintegrating tablet Take 1 tablet by mouth Every 8 (Eight) Hours As Needed for Nausea. 12 tablet 0   • [DISCONTINUED] sucralfate (CARAFATE) 1 GM/10ML suspension Take 10 mL by mouth 4 (Four) Times a Day With Meals & at Bedtime. 420 mL 0   • [DISCONTINUED] vitamin B-12 (CYANOCOBALAMIN) 1000 MCG tablet Take  by mouth.       No facility-administered encounter medications on file as of 3/27/2019.        Her blood pressures have been well controlled.  She will continue to eat a low-salt diet.    For hypercholesterolemia, continue to eat a low-fat and low sugar diet.  Try to get more physical activity at home.    For memory loss, continue reading and doing crossword puzzles.  Physical activity also stimulates brain activity.  Carrying on conversations with other people helps R memory.  Continue taking donepezil  daily.    For right-sided low back pain with right leg pain, try to do a few back stretches every day.  Use moist heat on the low back.  Heat on the thigh area may also help.  Continue taking meloxicam daily with food and continue taking gabapentin every evening.  May use tramadol as needed.  Watch for sedation or dizziness.  Start physical therapy at the [facility] on [place].    For general weakness, try to do a little more physical activity at home every day.  May do leg and arm exercises while seated watching TV.  Practice standing up and sitting back down in the chair and standing up again.  Continue getting some protein with each meal.  Start physical therapy.    For COPD, continue using Bevespi inhaler every day.  Doing a little physical activity every day also helps build up her lung function and stamina.    Risk for falls is decreased by regular exercise.  Doing physical therapy will help a lot.  Taking vitamin D also reduces the risk of falls.    For hypothyroidism, continue current thyroid replacement.  We will check labs one day this week.    For constipation, continue using MiraLAX daily and taking stool softeners.  Also drink plenty of fluids.      Fall Prevention in the Home, Adult  Falls can cause injuries and can affect people from all age groups. There are many simple things that you can do to make your home safe and to help prevent falls. Ask for help when making these changes, if needed.  What actions can I take to prevent falls?  General instructions  · Use good lighting in all rooms. Replace any light bulbs that burn out.  · Turn on lights if it is dark. Use night-lights.  · Place frequently used items in easy-to-reach places. Lower the shelves around your home if necessary.  · Set up furniture so that there are clear paths around it. Avoid moving your furniture around.  · Remove throw rugs and other tripping hazards from the floor.  · Avoid walking on wet floors.  · Fix any uneven floor  surfaces.  · Add color or contrast paint or tape to grab bars and handrails in your home. Place contrasting color strips on the first and last steps of stairways.  · When you use a stepladder, make sure that it is completely opened and that the sides are firmly locked. Have someone hold the ladder while you are using it. Do not climb a closed stepladder.  · Be aware of any and all pets.  What can I do in the bathroom?  · Keep the floor dry. Immediately clean up any water that spills onto the floor.  · Remove soap buildup in the tub or shower on a regular basis.  · Use non-skid mats or decals on the floor of the tub or shower.  · Attach bath mats securely with double-sided, non-slip rug tape.  · If you need to sit down while you are in the shower, use a plastic, non-slip stool.  · Install grab bars by the toilet and in the tub and shower. Do not use towel bars as grab bars.  What can I do in the bedroom?  · Make sure that a bedside light is easy to reach.  · Do not use oversized bedding that drapes onto the floor.  · Have a firm chair that has side arms to use for getting dressed.  What can I do in the kitchen?  · Clean up any spills right away.  · If you need to reach for something above you, use a sturdy step stool that has a grab bar.  · Keep electrical cables out of the way.  · Do not use floor polish or wax that makes floors slippery. If you must use wax, make sure that it is non-skid floor wax.  What can I do in the stairways?  · Do not leave any items on the stairs.  · Make sure that you have a light switch at the top of the stairs and the bottom of the stairs. Have them installed if you do not have them.  · Make sure that there are handrails on both sides of the stairs. Fix handrails that are broken or loose. Make sure that handrails are as long as the stairways.  · Install non-slip stair treads on all stairs in your home.  · Avoid having throw rugs at the top or bottom of stairways, or secure the rugs with  carpet tape to prevent them from moving.  · Choose a carpet design that does not hide the edge of steps on the stairway.  · Check any carpeting to make sure that it is firmly attached to the stairs. Fix any carpet that is loose or worn.  What can I do on the outside of my home?  · Use bright outdoor lighting.  · Regularly repair the edges of walkways and driveways and fix any cracks.  · Remove high doorway thresholds.  · Trim any shrubbery on the main path into your home.  · Regularly check that handrails are securely fastened and in good repair. Both sides of any steps should have handrails.  · Install guardrails along the edges of any raised decks or porches.  · Clear walkways of debris and clutter, including tools and rocks.  · Have leaves, snow, and ice cleared regularly.  · Use sand or salt on walkways during winter months.  · In the garage, clean up any spills right away, including grease or oil spills.  What other actions can I take?  · Wear closed-toe shoes that fit well and support your feet. Wear shoes that have rubber soles or low heels.  · Use mobility aids as needed, such as canes, walkers, scooters, and crutches.  · Review your medicines with your health care provider. Some medicines can cause dizziness or changes in blood pressure, which increase your risk of falling.  Talk with your health care provider about other ways that you can decrease your risk of falls. This may include working with a physical therapist or  to improve your strength, balance, and endurance.  Where to find more information  · Centers for Disease Control and Prevention, [place]: https://www.cdc.gov  · National [place] [employer] Aging: https://py3ncfp.chary.nih.gov  Contact a health care provider if:  · You are afraid of falling at home.  · You feel weak, drowsy, or dizzy at home.  · You fall at home.  Summary  · There are many simple things that you can do to make your home safe and to help prevent falls.  · Ways to make your  home safe include removing tripping hazards and installing grab bars in the bathroom.  · Ask for help when making these changes in your home.  This information is not intended to replace advice given to you by your health care provider. Make sure you discuss any questions you have with your health care provider.  Document Released: 12/08/2003 Document Revised: 08/02/2018 Document Reviewed: 08/02/2018  [employer] Interactive Patient Education © 2019 [employer]    Exercising to Stay Healthy  Exercising regularly is important. It has many health benefits, such as:  · Improving your overall fitness, flexibility, and endurance.  · Increasing your bone density.  · Helping with weight control.  · Decreasing your body fat.  · Increasing your muscle strength.  · Reducing stress and tension.  · Improving your overall health.    In order to become healthy and stay healthy, it is recommended that you do moderate-intensity and vigorous-intensity exercise. You can tell that you are exercising at a moderate intensity if you have a higher heart rate and faster breathing, but you are still able to hold a conversation. You can tell that you are exercising at a vigorous intensity if you are breathing much harder and faster and cannot hold a conversation while exercising.  How often should I exercise?  Choose an activity that you enjoy and set realistic goals. Your health care provider can help you to make an activity plan that works for you. Exercise regularly as directed by your health care provider. This may include:  · Doing resistance training twice each week, such as:  ? Push-ups.  ? Sit-ups.  ? Lifting weights.  ? Using resistance bands.  · Doing a given intensity of exercise for a given amount of time. Choose from these options:  ? 150 minutes of moderate-intensity exercise every week.  ? 75 minutes of vigorous-intensity exercise every week.  ? A mix of moderate-intensity and vigorous-intensity exercise every  week.    Children, pregnant women, people who are out of shape, people who are overweight, and older adults may need to consult a health care provider for individual recommendations. If you have any sort of medical condition, be sure to consult your health care provider before starting a new exercise program.  What are some exercise ideas?  Some moderate-intensity exercise ideas include:  · Walking at a rate of 1 mile in 15 minutes.  · Biking.  · Hiking.  · Golfing.  · Dancing.    Some vigorous-intensity exercise ideas include:  · Walking at a rate of at least 4.5 miles per hour.  · Jogging or running at a rate of 5 miles per hour.  · Biking at a rate of at least 10 miles per hour.  · Lap swimming.  · Roller-skating or in-line skating.  · Cross-country skiing.  · Vigorous competitive sports, such as football, basketball, and soccer.  · Jumping rope.  · Aerobic dancing.    What are some everyday activities that can help me to get exercise?  · Yard work, such as:  ? Pushing a .  ? Raking and bagging leaves.  · Washing and waxing your car.  · Pushing a stroller.  · Shoveling snow.  · Gardening.  · Washing windows or floors.  How can I be more active in my day-to-day activities?  · Use the stairs instead of the elevator.  · Take a walk during your lunch break.  · If you drive, park your car farther away from work or school.  · If you take public transportation, get off one stop early and walk the rest of the way.  · Make all of your phone calls while standing up and walking around.  · Get up, stretch, and walk around every 30 minutes throughout the day.  What guidelines should I follow while exercising?  · Do not exercise so much that you hurt yourself, feel dizzy, or get very short of breath.  · Consult your health care provider before starting a new exercise program.  · Wear comfortable clothes and shoes with good support.  · Drink plenty of water while you exercise to prevent dehydration or heat stroke.  Body water is lost during exercise and must be replaced.  · Work out until you breathe faster and your heart beats faster.  This information is not intended to replace advice given to you by your health care provider. Make sure you discuss any questions you have with your health care provider.  Document Released: 01/20/2012 Document Revised: 05/25/2017 Document Reviewed: 05/21/2015  [email or web address] Interactive Patient Education © 2018 [employer]    Reviewed use of high risk medication in the elderly: yes  Reviewed for potential of harmful drug interactions in the elderly: yes    Follow Up:  Return in about 4 months (around 7/27/2019) for Recheck thyroid and memory.     An After Visit Summary and PPPS with all of these plans were given to the patient.

## 2019-03-27 NOTE — PATIENT INSTRUCTIONS
Her blood pressures have been well controlled.  She will continue to eat a low-salt diet.    For hypercholesterolemia, continue to eat a low-fat and low sugar diet.  Try to get more physical activity at home.    For memory loss, continue reading and doing crossword puzzles.  Physical activity also stimulates brain activity.  Carrying on conversations with other people helps R memory.  Continue taking donepezil daily.    For right-sided low back pain with right leg pain, try to do a few back stretches every day.  Use moist heat on the low back.  Heat on the thigh area may also help.  Continue taking meloxicam daily with food and continue taking gabapentin every evening.  May use tramadol as needed.  Watch for sedation or dizziness.  Start physical therapy at the [facility] on [address].    For general weakness, try to do a little more physical activity at home every day.  May do leg and arm exercises while seated watching TV.  Practice standing up and sitting back down in the chair and standing up again.  Continue getting some protein with each meal.  Start physical therapy.    For COPD, continue using Bevespi inhaler every day.  Doing a little physical activity every day also helps build up her lung function and stamina.    Risk for falls is decreased by regular exercise.  Doing physical therapy will help a lot.  Taking vitamin D also reduces the risk of falls.    For hypothyroidism, continue current thyroid replacement.  We will check labs one day this week.    For constipation, continue using MiraLAX daily and taking stool softeners.  Also drink plenty of fluids.      Fall Prevention in the Home, Adult  Falls can cause injuries and can affect people from all age groups. There are many simple things that you can do to make your home safe and to help prevent falls. Ask for help when making these changes, if needed.  What actions can I take to prevent falls?  General instructions  · Use good lighting in all rooms. Replace any  light bulbs that burn out.  · Turn on lights if it is dark. Use night-lights.  · Place frequently used items in easy-to-reach places. Lower the shelves around your home if necessary.  · Set up furniture so that there are clear paths around it. Avoid moving your furniture around.  · Remove throw rugs and other tripping hazards from the floor.  · Avoid walking on wet floors.  · Fix any uneven floor surfaces.  · Add color or contrast paint or tape to grab bars and handrails in your home. Place contrasting color strips on the first and last steps of stairways.  · When you use a stepladder, make sure that it is completely opened and that the sides are firmly locked. Have someone hold the ladder while you are using it. Do not climb a closed stepladder.  · Be aware of any and all pets.  What can I do in the bathroom?  · Keep the floor dry. Immediately clean up any water that spills onto the floor.  · Remove soap buildup in the tub or shower on a regular basis.  · Use non-skid mats or decals on the floor of the tub or shower.  · Attach bath mats securely with double-sided, non-slip rug tape.  · If you need to sit down while you are in the shower, use a plastic, non-slip stool.  · Install grab bars by the toilet and in the tub and shower. Do not use towel bars as grab bars.  What can I do in the bedroom?  · Make sure that a bedside light is easy to reach.  · Do not use oversized bedding that drapes onto the floor.  · Have a firm chair that has side arms to use for getting dressed.  What can I do in the kitchen?  · Clean up any spills right away.  · If you need to reach for something above you, use a sturdy step stool that has a grab bar.  · Keep electrical cables out of the way.  · Do not use floor polish or wax that makes floors slippery. If you must use wax, make sure that it is non-skid floor wax.  What can I do in the stairways?  · Do not leave any items on the stairs.  · Make sure that you have a light switch at the top  of the stairs and the bottom of the stairs. Have them installed if you do not have them.  · Make sure that there are handrails on both sides of the stairs. Fix handrails that are broken or loose. Make sure that handrails are as long as the stairways.  · Install non-slip stair treads on all stairs in your home.  · Avoid having throw rugs at the top or bottom of stairways, or secure the rugs with carpet tape to prevent them from moving.  · Choose a carpet design that does not hide the edge of steps on the stairway.  · Check any carpeting to make sure that it is firmly attached to the stairs. Fix any carpet that is loose or worn.  What can I do on the outside of my home?  · Use bright outdoor lighting.  · Regularly repair the edges of walkways and driveways and fix any cracks.  · Remove high doorway thresholds.  · Trim any shrubbery on the main path into your home.  · Regularly check that handrails are securely fastened and in good repair. Both sides of any steps should have handrails.  · Install guardrails along the edges of any raised decks or porches.  · Clear walkways of debris and clutter, including tools and rocks.  · Have leaves, snow, and ice cleared regularly.  · Use sand or salt on walkways during winter months.  · In the garage, clean up any spills right away, including grease or oil spills.  What other actions can I take?  · Wear closed-toe shoes that fit well and support your feet. Wear shoes that have rubber soles or low heels.  · Use mobility aids as needed, such as canes, walkers, scooters, and crutches.  · Review your medicines with your health care provider. Some medicines can cause dizziness or changes in blood pressure, which increase your risk of falling.  Talk with your health care provider about other ways that you can decrease your risk of falls. This may include working with a physical therapist or  to improve your strength, balance, and endurance.  Where to find more  information  · Centers for Disease Control and Prevention, [place]: https://www.cdc.gov  · National [place] on Aging: https://wt9hegx.chary.nih.gov  Contact a health care provider if:  · You are afraid of falling at home.  · You feel weak, drowsy, or dizzy at home.  · You fall at home.  Summary  · There are many simple things that you can do to make your home safe and to help prevent falls.  · Ways to make your home safe include removing tripping hazards and installing grab bars in the bathroom.  · Ask for help when making these changes in your home.  This information is not intended to replace advice given to you by your health care provider. Make sure you discuss any questions you have with your health care provider.  Document Released: 12/08/2003 Document Revised: 08/02/2018 Document Reviewed: 08/02/2018  [employer] Interactive Patient Education © 2019 [employer]    Exercising to Stay Healthy  Exercising regularly is important. It has many health benefits, such as:  · Improving your overall fitness, flexibility, and endurance.  · Increasing your bone density.  · Helping with weight control.  · Decreasing your body fat.  · Increasing your muscle strength.  · Reducing stress and tension.  · Improving your overall health.    In order to become healthy and stay healthy, it is recommended that you do moderate-intensity and vigorous-intensity exercise. You can tell that you are exercising at a moderate intensity if you have a higher heart rate and faster breathing, but you are still able to hold a conversation. You can tell that you are exercising at a vigorous intensity if you are breathing much harder and faster and cannot hold a conversation while exercising.  How often should I exercise?  Choose an activity that you enjoy and set realistic goals. Your health care provider can help you to make an activity plan that works for you. Exercise regularly as directed by your health care provider. This may include:  · Doing  resistance training twice each week, such as:  ? Push-ups.  ? Sit-ups.  ? Lifting weights.  ? Using resistance bands.  · Doing a given intensity of exercise for a given amount of time. Choose from these options:  ? 150 minutes of moderate-intensity exercise every week.  ? 75 minutes of vigorous-intensity exercise every week.  ? A mix of moderate-intensity and vigorous-intensity exercise every week.    Children, pregnant women, people who are out of shape, people who are overweight, and older adults may need to consult a health care provider for individual recommendations. If you have any sort of medical condition, be sure to consult your health care provider before starting a new exercise program.  What are some exercise ideas?  Some moderate-intensity exercise ideas include:  · Walking at a rate of 1 mile in 15 minutes.  · Biking.  · Hiking.  · Golfing.  · Dancing.    Some vigorous-intensity exercise ideas include:  · Walking at a rate of at least 4.5 miles per hour.  · Jogging or running at a rate of 5 miles per hour.  · Biking at a rate of at least 10 miles per hour.  · Lap swimming.  · Roller-skating or in-line skating.  · Cross-country skiing.  · Vigorous competitive sports, such as football, basketball, and soccer.  · Jumping rope.  · Aerobic dancing.    What are some everyday activities that can help me to get exercise?  · Yard work, such as:  ? Pushing a .  ? Raking and bagging leaves.  · Washing and waxing your car.  · Pushing a stroller.  · Shoveling snow.  · Gardening.  · Washing windows or floors.  How can I be more active in my day-to-day activities?  · Use the stairs instead of the elevator.  · Take a walk during your lunch break.  · If you drive, park your car farther away from work or school.  · If you take public transportation, get off one stop early and walk the rest of the way.  · Make all of your phone calls while standing up and walking around.  · Get up, stretch, and walk around  every 30 minutes throughout the day.  What guidelines should I follow while exercising?  · Do not exercise so much that you hurt yourself, feel dizzy, or get very short of breath.  · Consult your health care provider before starting a new exercise program.  · Wear comfortable clothes and shoes with good support.  · Drink plenty of water while you exercise to prevent dehydration or heat stroke. Body water is lost during exercise and must be replaced.  · Work out until you breathe faster and your heart beats faster.  This information is not intended to replace advice given to you by your health care provider. Make sure you discuss any questions you have with your health care provider.  Document Released: 01/20/2012 Document Revised: 05/25/2017 Document Reviewed: 05/21/2015  [employer] Interactive Patient Education © 2018 [employer]

## 2019-04-02 ENCOUNTER — TELEPHONE (OUTPATIENT)
Dept: INTERNAL MEDICINE | Facility: CLINIC | Age: 84
End: 2019-04-02

## 2019-04-02 NOTE — TELEPHONE ENCOUNTER
[facility] NEEDS TO KNOW IF THE DOSE OF VESICARE IS 10MG ONCE A DAY OR TWICE A DAY.    CALL CORRECT DIRECTIONS TO [facility] [phone]    REFERENCE #[id number]

## 2019-04-03 ENCOUNTER — LAB (OUTPATIENT)
Dept: INTERNAL MEDICINE | Facility: CLINIC | Age: 84
End: 2019-04-03

## 2019-04-03 DIAGNOSIS — E78.00 HYPERCHOLESTEREMIA: ICD-10-CM

## 2019-04-03 DIAGNOSIS — I10 BENIGN ESSENTIAL HYPERTENSION: ICD-10-CM

## 2019-04-03 DIAGNOSIS — E03.9 HYPOTHYROIDISM (ACQUIRED): ICD-10-CM

## 2019-04-03 DIAGNOSIS — E55.9 VITAMIN D DEFICIENCY: ICD-10-CM

## 2019-04-03 LAB
25(OH)D3 SERPL-MCNC: 39.5 NG/ML
ALBUMIN SERPL-MCNC: 4.01 G/DL (ref 3.2–4.8)
ALBUMIN/GLOB SERPL: 1.7 G/DL (ref 1.5–2.5)
ALP SERPL-CCNC: 69 U/L (ref 25–100)
ALT SERPL W P-5'-P-CCNC: 14 U/L (ref 7–40)
ANION GAP SERPL CALCULATED.3IONS-SCNC: 8 MMOL/L (ref 3–11)
ARTICHOKE IGE QN: 140 MG/DL (ref 0–130)
AST SERPL-CCNC: 23 U/L (ref 0–33)
BACTERIA UR QL AUTO: NORMAL /HPF
BASOPHILS # BLD AUTO: 0.06 10*3/MM3 (ref 0–0.2)
BASOPHILS NFR BLD AUTO: 0.8 % (ref 0–1)
BILIRUB SERPL-MCNC: 0.7 MG/DL (ref 0.3–1.2)
BILIRUB UR QL STRIP: NEGATIVE
BUN BLD-MCNC: 15 MG/DL (ref 9–23)
BUN/CREAT SERPL: 17.6 (ref 7–25)
CALCIUM SPEC-SCNC: 8.9 MG/DL (ref 8.7–10.4)
CHLORIDE SERPL-SCNC: 102 MMOL/L (ref 99–109)
CHOLEST SERPL-MCNC: 214 MG/DL (ref 0–200)
CLARITY UR: ABNORMAL
CO2 SERPL-SCNC: 28 MMOL/L (ref 20–31)
COLOR UR: YELLOW
CREAT BLD-MCNC: 0.85 MG/DL (ref 0.6–1.3)
DEPRECATED RDW RBC AUTO: 51.2 FL (ref 37–54)
EOSINOPHIL # BLD AUTO: 0.71 10*3/MM3 (ref 0–0.3)
EOSINOPHIL NFR BLD AUTO: 8.9 % (ref 0–3)
ERYTHROCYTE [DISTWIDTH] IN BLOOD BY AUTOMATED COUNT: 14.2 % (ref 11.3–14.5)
GFR SERPL CREATININE-BSD FRML MDRD: 62 ML/MIN/1.73
GLOBULIN UR ELPH-MCNC: 2.4 GM/DL
GLUCOSE BLD-MCNC: 94 MG/DL (ref 70–100)
GLUCOSE UR STRIP-MCNC: NEGATIVE MG/DL
HCT VFR BLD AUTO: 41.9 % (ref 34.5–44)
HDLC SERPL-MCNC: 62 MG/DL (ref 40–60)
HGB BLD-MCNC: 13.2 G/DL (ref 11.5–15.5)
HGB UR QL STRIP.AUTO: NEGATIVE
HYALINE CASTS UR QL AUTO: NORMAL /LPF
IMM GRANULOCYTES # BLD AUTO: 0.01 10*3/MM3 (ref 0–0.05)
IMM GRANULOCYTES NFR BLD AUTO: 0.1 % (ref 0–0.6)
KETONES UR QL STRIP: NEGATIVE
LEUKOCYTE ESTERASE UR QL STRIP.AUTO: NEGATIVE
LYMPHOCYTES # BLD AUTO: 2.62 10*3/MM3 (ref 0.6–4.8)
LYMPHOCYTES NFR BLD AUTO: 32.9 % (ref 24–44)
MCH RBC QN AUTO: 31 PG (ref 27–31)
MCHC RBC AUTO-ENTMCNC: 31.5 G/DL (ref 32–36)
MCV RBC AUTO: 98.4 FL (ref 80–99)
MONOCYTES # BLD AUTO: 0.84 10*3/MM3 (ref 0–1)
MONOCYTES NFR BLD AUTO: 10.6 % (ref 0–12)
NEUTROPHILS # BLD AUTO: 3.73 10*3/MM3 (ref 1.5–8.3)
NEUTROPHILS NFR BLD AUTO: 46.8 % (ref 41–71)
NITRITE UR QL STRIP: NEGATIVE
PH UR STRIP.AUTO: 8.5 [PH] (ref 5–8)
PLATELET # BLD AUTO: 259 10*3/MM3 (ref 150–450)
PMV BLD AUTO: 12.1 FL (ref 6–12)
POTASSIUM BLD-SCNC: 4 MMOL/L (ref 3.5–5.5)
PROT SERPL-MCNC: 6.4 G/DL (ref 5.7–8.2)
PROT UR QL STRIP: NEGATIVE
RBC # BLD AUTO: 4.26 10*6/MM3 (ref 3.89–5.14)
RBC # UR: NORMAL /HPF
REF LAB TEST METHOD: NORMAL
SODIUM BLD-SCNC: 138 MMOL/L (ref 132–146)
SP GR UR STRIP: 1.01 (ref 1–1.03)
SQUAMOUS #/AREA URNS HPF: NORMAL /HPF
T4 FREE SERPL-MCNC: 1.42 NG/DL (ref 0.89–1.76)
TRIGL SERPL-MCNC: 101 MG/DL (ref 0–150)
TSH SERPL DL<=0.05 MIU/L-ACNC: 2.8 MIU/ML (ref 0.35–5.35)
UROBILINOGEN UR QL STRIP: ABNORMAL
WBC NRBC COR # BLD: 7.96 10*3/MM3 (ref 3.5–10.8)
WBC UR QL AUTO: NORMAL /HPF

## 2019-04-03 PROCEDURE — 80053 COMPREHEN METABOLIC PANEL: CPT | Performed by: INTERNAL MEDICINE

## 2019-04-03 PROCEDURE — 82306 VITAMIN D 25 HYDROXY: CPT | Performed by: INTERNAL MEDICINE

## 2019-04-03 PROCEDURE — 80061 LIPID PANEL: CPT | Performed by: INTERNAL MEDICINE

## 2019-04-03 PROCEDURE — 84439 ASSAY OF FREE THYROXINE: CPT | Performed by: INTERNAL MEDICINE

## 2019-04-03 PROCEDURE — 36415 COLL VENOUS BLD VENIPUNCTURE: CPT | Performed by: INTERNAL MEDICINE

## 2019-04-03 PROCEDURE — 81001 URINALYSIS AUTO W/SCOPE: CPT | Performed by: INTERNAL MEDICINE

## 2019-04-03 PROCEDURE — 84481 FREE ASSAY (FT-3): CPT | Performed by: INTERNAL MEDICINE

## 2019-04-03 PROCEDURE — 85025 COMPLETE CBC W/AUTO DIFF WBC: CPT | Performed by: INTERNAL MEDICINE

## 2019-04-03 PROCEDURE — 84443 ASSAY THYROID STIM HORMONE: CPT | Performed by: INTERNAL MEDICINE

## 2019-04-04 LAB — T3FREE SERPL-MCNC: 2 PG/ML (ref 2–4.4)

## 2019-07-31 RX ORDER — GABAPENTIN 100 MG/1
200 CAPSULE ORAL NIGHTLY
Qty: 60 CAPSULE | Refills: 2 | Status: SHIPPED | OUTPATIENT
Start: 2019-07-31 | End: 2019-10-11 | Stop reason: SDUPTHER

## 2019-09-23 RX ORDER — OMEPRAZOLE 40 MG/1
CAPSULE, DELAYED RELEASE ORAL
Qty: 90 CAPSULE | Refills: 4 | Status: SHIPPED | OUTPATIENT
Start: 2019-09-23 | End: 2019-10-11 | Stop reason: SDUPTHER

## 2019-10-11 ENCOUNTER — OFFICE VISIT (OUTPATIENT)
Dept: INTERNAL MEDICINE | Facility: CLINIC | Age: 84
End: 2019-10-11

## 2019-10-11 ENCOUNTER — TELEPHONE (OUTPATIENT)
Dept: INTERNAL MEDICINE | Facility: CLINIC | Age: 84
End: 2019-10-11

## 2019-10-11 VITALS
DIASTOLIC BLOOD PRESSURE: 80 MMHG | SYSTOLIC BLOOD PRESSURE: 128 MMHG | BODY MASS INDEX: 19.97 KG/M2 | HEIGHT: 64 IN | WEIGHT: 117 LBS | HEART RATE: 74 BPM

## 2019-10-11 DIAGNOSIS — I10 BENIGN ESSENTIAL HYPERTENSION: ICD-10-CM

## 2019-10-11 DIAGNOSIS — G89.29 CHRONIC RIGHT-SIDED LOW BACK PAIN WITH RIGHT-SIDED SCIATICA: Primary | Chronic | ICD-10-CM

## 2019-10-11 DIAGNOSIS — F32.0 MILD MAJOR DEPRESSION, SINGLE EPISODE (HCC): ICD-10-CM

## 2019-10-11 DIAGNOSIS — Z91.81 AT HIGH RISK FOR FALLS: ICD-10-CM

## 2019-10-11 DIAGNOSIS — N39.41 URGE INCONTINENCE OF URINE: ICD-10-CM

## 2019-10-11 DIAGNOSIS — M81.0 SENILE OSTEOPOROSIS: ICD-10-CM

## 2019-10-11 DIAGNOSIS — M54.41 CHRONIC RIGHT-SIDED LOW BACK PAIN WITH RIGHT-SIDED SCIATICA: Primary | Chronic | ICD-10-CM

## 2019-10-11 DIAGNOSIS — E78.00 HYPERCHOLESTEREMIA: ICD-10-CM

## 2019-10-11 DIAGNOSIS — R41.3 MEMORY LOSS: ICD-10-CM

## 2019-10-11 DIAGNOSIS — K59.01 CONSTIPATION BY DELAYED COLONIC TRANSIT: ICD-10-CM

## 2019-10-11 DIAGNOSIS — J44.9 COPD (CHRONIC OBSTRUCTIVE PULMONARY DISEASE) WITH CHRONIC BRONCHITIS (HCC): ICD-10-CM

## 2019-10-11 DIAGNOSIS — E03.9 HYPOTHYROIDISM (ACQUIRED): ICD-10-CM

## 2019-10-11 PROBLEM — R29.6 FALLS FREQUENTLY: Status: RESOLVED | Noted: 2019-03-19 | Resolved: 2019-10-11

## 2019-10-11 PROBLEM — G44.311 INTRACTABLE ACUTE POST-TRAUMATIC HEADACHE: Status: RESOLVED | Noted: 2019-03-19 | Resolved: 2019-10-11

## 2019-10-11 PROCEDURE — 99214 OFFICE O/P EST MOD 30 MIN: CPT | Performed by: INTERNAL MEDICINE

## 2019-10-11 RX ORDER — LEVOTHYROXINE SODIUM 0.1 MG/1
100 TABLET ORAL DAILY
Qty: 90 TABLET | Refills: 1 | Status: SHIPPED | OUTPATIENT
Start: 2019-10-11 | End: 2019-10-18 | Stop reason: SDUPTHER

## 2019-10-11 RX ORDER — TRAMADOL HYDROCHLORIDE 50 MG/1
50 TABLET ORAL EVERY 6 HOURS PRN
Qty: 90 TABLET | Refills: 0 | Status: SHIPPED | OUTPATIENT
Start: 2019-10-11 | End: 2019-10-11 | Stop reason: SDUPTHER

## 2019-10-11 RX ORDER — GABAPENTIN 100 MG/1
200 CAPSULE ORAL NIGHTLY
Qty: 60 CAPSULE | Refills: 2 | Status: SHIPPED | OUTPATIENT
Start: 2019-10-11 | End: 2020-02-17

## 2019-10-11 RX ORDER — TRAMADOL HYDROCHLORIDE 50 MG/1
50 TABLET ORAL EVERY 6 HOURS PRN
Qty: 30 TABLET | Refills: 2 | Status: SHIPPED | OUTPATIENT
Start: 2019-10-11 | End: 2020-07-31

## 2019-10-11 RX ORDER — DONEPEZIL HYDROCHLORIDE 5 MG/1
5 TABLET, FILM COATED ORAL NIGHTLY
Qty: 90 TABLET | Refills: 1 | Status: SHIPPED | OUTPATIENT
Start: 2019-10-11 | End: 2019-10-18 | Stop reason: SDUPTHER

## 2019-10-11 RX ORDER — OMEPRAZOLE 40 MG/1
40 CAPSULE, DELAYED RELEASE ORAL DAILY
Qty: 90 CAPSULE | Refills: 4 | Status: SHIPPED | OUTPATIENT
Start: 2019-10-11

## 2019-10-11 RX ORDER — MELOXICAM 7.5 MG/1
7.5 TABLET ORAL DAILY
Qty: 90 TABLET | Refills: 1 | Status: SHIPPED | OUTPATIENT
Start: 2019-10-11 | End: 2019-10-27 | Stop reason: SDUPTHER

## 2019-10-11 RX ORDER — SOLIFENACIN SUCCINATE 10 MG/1
20 TABLET, FILM COATED ORAL DAILY
Qty: 180 TABLET | Refills: 1 | Status: SHIPPED | OUTPATIENT
Start: 2019-10-11 | End: 2019-12-23

## 2019-10-11 NOTE — PROGRESS NOTES
[facility]     Darline Reed  2/8/1926   [id number]      Patient Care Team:  Sandra Adkins MD as PCP - General  Sandra Adkins MD as PCP - Family Medicine  Rob Woods MD as Consulting Physician (Orthopedic Surgery)  Shade Nesbitt MD as Consulting Physician (Ophthalmology)    Chief Complaint;:   Chief Complaint   Patient presents with   • Hypertension     f/u   • Hyperlipidemia     f/u            HPI  Patient is a 89 y.o. female presents with back pain,hypertension,hyperlipidemia.    CHRONIC CONDITIONS  Leg pain is ongoing. She won't take tylenol for it.     She will take tramadol occasionally and go to bed to help with backpain.     She lives alone but her daughter and son and son-in-law go over to see her frequently and take her meals.  They also check the pillbox which the patient still fixes for herself.  She does not go out much except for to go to the hairdresser and occasionally out to eat.  She is getting around her house okay.  No problems.  No recent falls.    Shortness of breath. No worse. Bevespi daily helps.    GERD controlled on omeprazole    Sertraline does help with mood     Past Medical History:   Diagnosis Date   • Abnormal PFTs 2010    Restriction and obstruction on PFT/s   • Arthritis    • Asthma    • Cachexia (CMS/HCC) 3/19/2019   • COPD (chronic obstructive pulmonary disease) (CMS/[facility])    • Diverticulitis    • Dyspnea    • Falls frequently 3/19/2019   • GERD (gastroesophageal reflux disease)    • Hypercholesteremia    • Hypertension 6/22/2016   • Hypothyroid    • Intractable acute post-traumatic headache 3/19/2019   • Osteoporosis    • Positive PPD    • Right knee meniscal tear 2004   • Right knee sprain     conservative   • Scoliosis    • Weight loss 6/19/2016       Past Surgical History:   Procedure Laterality Date   • BLEPHAROPLASTY     • CATARACT EXTRACTION  1999   • COSMETIC SURGERY     • TONSILLECTOMY  1947   • TONSILLECTOMY AND ADENOIDECTOMY         Family  "History   Problem Relation Age of Onset   • Coronary artery disease Mother    • Arthritis Mother    • Diabetes Mother    • Hyperlipidemia Mother    • Cardiomyopathy Son        Social History     Socioeconomic History   • Marital status:      Spouse name: Not on file   • Number of children: Not on file   • Years of education: Not on file   • Highest education level: Not on file   Tobacco Use   • Smoking status: Never Smoker   • Smokeless tobacco: Never Used   Substance and Sexual Activity   • Alcohol use: No   • Drug use: No   • Sexual activity: No     Comment:        Allergies   Allergen Reactions   • Codeine    • Penicillins        Review of Systems:     Review of Systems   Constitutional: Negative for chills, fatigue and fever.   Respiratory: Negative for cough, shortness of breath and wheezing.    Cardiovascular: Negative for chest pain and palpitations.   Gastrointestinal: Negative for abdominal pain, blood in stool, constipation, diarrhea and GERD.   Genitourinary: Negative for dysuria and frequency.   Musculoskeletal: Positive for arthralgias and back pain.   Neurological: Positive for weakness and memory problem.   Psychiatric/Behavioral: Negative for sleep disturbance and suicidal ideas. The patient is not nervous/anxious.        Vital Signs  Vitals:    10/11/19 1250   BP: 128/80   BP Location: Left arm   Patient Position: Sitting   Cuff Size: Adult   Pulse: 74   Weight: 53.1 kg (117 lb)   Height: 162 cm (63.78\")   PainSc: 0-No pain     Body mass index is 20.22 kg/m².      Current Outpatient Medications:   •  Cholecalciferol (VITAMIN D3) 2000 units capsule, 1 capsule by mouth  daily, Disp: , Rfl:   •  Cyanocobalamin 5000 MCG tablet dispersible, take 1 tablet daily, Disp: , Rfl:   •  docusate sodium (COLACE) 250 MG capsule, Take 2 capsules by mouth Daily., Disp: , Rfl:   •  donepezil (ARICEPT) 5 MG tablet, Take 1 tablet by mouth Every Night., Disp: 90 tablet, Rfl: 1  •  gabapentin (NEURONTIN) 100 " MG capsule, Take 2 capsules by mouth Every Night., Disp: 60 capsule, Rfl: 2  •  Glycopyrrolate-Formoterol (BEVESPI AEROSPHERE) 9-4.8 MCG/ACT aerosol, Inhale 1 spray Daily., Disp: 3 inhaler, Rfl: 1  •  levothyroxine (SYNTHROID, LEVOTHROID) 100 MCG tablet, Take 1 tablet by mouth Daily., Disp: 90 tablet, Rfl: 1  •  Magnesium Citrate 100 MG tablet, take 1 tablet every evening, Disp: , Rfl:   •  meloxicam (MOBIC) 7.5 MG tablet, Take 1 tablet by mouth Daily., Disp: 90 tablet, Rfl: 1  •  Multiple Vitamins-Minerals (PRESERVISION AREDS 2+MULTI VIT) capsule, Take one capsule by oral route once a day, Disp: , Rfl:   •  omeprazole (priLOSEC) 40 MG capsule, Take 1 capsule by mouth Daily., Disp: 90 capsule, Rfl: 4  •  Selenium 200 MCG tablet, Take 1 tablet by mouth Daily., Disp: 90 each, Rfl: 1  •  sertraline (ZOLOFT) 50 MG tablet, Take 0.5 tablets by mouth Daily., Disp: 46 tablet, Rfl: 1  •  VESICARE 10 MG tablet, Take 2 tablets by mouth Daily., Disp: 180 tablet, Rfl: 1  •  polyethylene glycol (MIRALAX) packet, Take 17 g by mouth 2 (two) times a day for 30 days., Disp: 1020 g, Rfl: 5  •  traMADol (ULTRAM) 50 MG tablet, Take 1 tablet by mouth Every 6 (Six) Hours As Needed for Moderate Pain  or Severe Pain  for up to 30 days., Disp: 30 tablet, Rfl: 2    Physical Exam:    Physical Exam   Constitutional: She is oriented to person, place, and time. She appears well-developed and well-nourished.   HENT:   Head: Normocephalic.   Eyes: Conjunctivae and EOM are normal. Pupils are equal, round, and reactive to light.   Neck: Normal range of motion. Neck supple. No thyromegaly present.   Cardiovascular: Normal rate, regular rhythm, normal heart sounds and intact distal pulses.   Pulmonary/Chest: Effort normal and breath sounds normal.   Musculoskeletal: Normal range of motion. She exhibits no edema.   Lymphadenopathy:     She has no cervical adenopathy.   Neurological: She is alert and oriented to person, place, and time. Gait abnormal.    Frail   Psychiatric: She has a normal mood and affect. Thought content normal.   Nursing note and vitals reviewed.       ACE III MINI        Results Review:    None    CMP:  Lab Results   Component Value Date    BUN 15 04/03/2019    CREATININE 0.85 04/03/2019    EGFRIFNONA 62 04/03/2019    BCR 17.6 04/03/2019     04/03/2019    K 4.0 04/03/2019    CO2 28.0 04/03/2019    CALCIUM 8.9 04/03/2019    ALBUMIN 4.01 04/03/2019    BILITOT 0.7 04/03/2019    ALKPHOS 69 04/03/2019    AST 23 04/03/2019    ALT 14 04/03/2019     HbA1c:  No results found for: HGBA1C  Microalbumin:  No results found for: MICROALBUR, POCMALB, POCCREAT  Lipid Panel  Lab Results   Component Value Date    CHOL 214 (H) 04/03/2019    TRIG 101 04/03/2019    HDL 62 (H) 04/03/2019     (H) 04/03/2019    AST 23 04/03/2019    ALT 14 04/03/2019       Medication Review: Medications reviewed and noted    Problem List Items Addressed This Visit        Cardiovascular and Mediastinum    Hypercholesteremia    Benign essential hypertension    Overview     10/11/2019 Sandra Adkins MD    Lifestyle control.   Continue low salt diet.            Respiratory    COPD (chronic obstructive pulmonary disease) with chronic bronchitis (CMS/HCC)    Overview     10/11/2019 Sandra Adkins MD    Continue bevespi inhaler daily.         Relevant Medications    Glycopyrrolate-Formoterol (BEVESPI AEROSPHERE) 9-4.8 MCG/ACT aerosol       Digestive    Constipation by delayed colonic transit    Overview     10/11/2019 Sandra Adkins MD    Continue miralax. Drink plenty of fluids.            Endocrine    Hypothyroidism (acquired)    Overview     10/11/2019 Sandra Adkins MD    Continue levothyroxine daily.         Relevant Medications    levothyroxine (SYNTHROID, LEVOTHROID) 100 MCG tablet       Nervous and Auditory    Chronic right-sided low back pain with right-sided sciatica - Primary (Chronic)    Overview     10/11/2019 Sandra Adkins MD    Continue meloxicam  daily and gabapentin every evening. Continue tramadol as needed. May also take tylenol as needed for pain.            Musculoskeletal and Integument    Senile osteoporosis    Overview     10/11/2019 Sandra Adkins MD    Continue taking calcium and vitamin D.  Try to be active around the house some every day.            Genitourinary    Urge incontinence of urine    Overview     10/11/2019 Sandra Adkins MD    Continue Vesicare.            Other    Memory loss    Overview     10/11/2019 Sandra Adkins MD    Continue donepezil and selenium. Continue doing word puzzles and reading a little.         At high risk for falls    Overview     10/11/2019 Sandra Adkins MD    Multifactorial fall risk.  We reviewed fall risk with medications. She is tolerating her current regimen and is noting benefit with her medications.         Mild major depression, single episode (CMS/HCC)    Overview     10/11/2019 Sandra Adkins MD    Continue sertraline daily.         Relevant Medications    donepezil (ARICEPT) 5 MG tablet    sertraline (ZOLOFT) 50 MG tablet            Diagnosis Plan   1. Chronic right-sided low back pain with right-sided sciatica     2. Memory loss     3. Hypothyroidism (acquired)     4. Mild major depression, single episode (CMS/HCC)     5. Hypercholesteremia     6. COPD (chronic obstructive pulmonary disease) with chronic bronchitis (CMS/HCC)     7. At high risk for falls     8. Benign essential hypertension     9. Urge incontinence of urine     10. Constipation by delayed colonic transit     11. Senile osteoporosis         There are no Patient Instructions on file for this visit.    Plan of care reviewed with patient at the conclusion of today's visit. Education was provided regarding diagnosis, management, and any prescribed or recommended OTC medications.Patient verbalizes understanding of and agreement with management plan.         Sandra Adkins MD

## 2019-10-11 NOTE — TELEPHONE ENCOUNTER
Pharmacy called stating they received pt's Rx for Bevespi inhaler 1 puff qd but it is usually for 2 puffs bid wants to verify that 1 puff is how you want pt to take it

## 2019-10-18 RX ORDER — DONEPEZIL HYDROCHLORIDE 5 MG/1
TABLET, FILM COATED ORAL
Qty: 90 TABLET | Refills: 4 | Status: SHIPPED | OUTPATIENT
Start: 2019-10-18

## 2019-10-18 RX ORDER — LEVOTHYROXINE SODIUM 0.1 MG/1
TABLET ORAL
Qty: 90 TABLET | Refills: 4 | Status: SHIPPED | OUTPATIENT
Start: 2019-10-18

## 2019-10-28 RX ORDER — MELOXICAM 7.5 MG/1
TABLET ORAL
Qty: 90 TABLET | Refills: 4 | Status: SHIPPED | OUTPATIENT
Start: 2019-10-28 | End: 2020-09-22 | Stop reason: HOSPADM

## 2019-12-23 RX ORDER — SOLIFENACIN SUCCINATE 10 MG/1
TABLET, FILM COATED ORAL
Qty: 180 TABLET | Refills: 4 | Status: SHIPPED | OUTPATIENT
Start: 2019-12-23 | End: 2020-07-31 | Stop reason: SDUPTHER

## 2020-02-17 DIAGNOSIS — M54.41 CHRONIC RIGHT-SIDED LOW BACK PAIN WITH RIGHT-SIDED SCIATICA: Primary | Chronic | ICD-10-CM

## 2020-02-17 DIAGNOSIS — G89.29 CHRONIC RIGHT-SIDED LOW BACK PAIN WITH RIGHT-SIDED SCIATICA: Primary | Chronic | ICD-10-CM

## 2020-02-17 RX ORDER — GABAPENTIN 100 MG/1
CAPSULE ORAL
Qty: 60 CAPSULE | Refills: 1 | Status: SHIPPED | OUTPATIENT
Start: 2020-02-17 | End: 2020-04-20

## 2020-04-18 DIAGNOSIS — G89.29 CHRONIC RIGHT-SIDED LOW BACK PAIN WITH RIGHT-SIDED SCIATICA: Chronic | ICD-10-CM

## 2020-04-18 DIAGNOSIS — M54.41 CHRONIC RIGHT-SIDED LOW BACK PAIN WITH RIGHT-SIDED SCIATICA: Chronic | ICD-10-CM

## 2020-04-20 RX ORDER — GABAPENTIN 100 MG/1
CAPSULE ORAL
Qty: 60 CAPSULE | Refills: 0 | Status: SHIPPED | OUTPATIENT
Start: 2020-04-20 | End: 2020-05-18

## 2020-04-20 NOTE — TELEPHONE ENCOUNTER
Patient's daughter Padilla Baig requested a refill of gabapentin (NEURONTIN) 100 MG capsule. Patient is completely out of medication.    Please call and advise. Padilla Baig's call back [phone]    [address] AT [facility] - [phone] [facility] [phone] FX

## 2020-05-18 DIAGNOSIS — M54.41 CHRONIC RIGHT-SIDED LOW BACK PAIN WITH RIGHT-SIDED SCIATICA: Chronic | ICD-10-CM

## 2020-05-18 DIAGNOSIS — G89.29 CHRONIC RIGHT-SIDED LOW BACK PAIN WITH RIGHT-SIDED SCIATICA: Chronic | ICD-10-CM

## 2020-05-18 RX ORDER — GABAPENTIN 100 MG/1
CAPSULE ORAL
Qty: 60 CAPSULE | Refills: 0 | Status: SHIPPED | OUTPATIENT
Start: 2020-05-18 | End: 2020-06-15

## 2020-05-20 RX ORDER — GLYCOPYRROLATE AND FORMOTEROL FUMARATE 9; 4.8 UG/1; UG/1
AEROSOL, METERED RESPIRATORY (INHALATION)
Qty: 32.1 G | Refills: 2 | Status: SHIPPED | OUTPATIENT
Start: 2020-05-20 | End: 2020-07-31

## 2020-06-15 DIAGNOSIS — G89.29 CHRONIC RIGHT-SIDED LOW BACK PAIN WITH RIGHT-SIDED SCIATICA: Chronic | ICD-10-CM

## 2020-06-15 DIAGNOSIS — M54.41 CHRONIC RIGHT-SIDED LOW BACK PAIN WITH RIGHT-SIDED SCIATICA: Chronic | ICD-10-CM

## 2020-06-15 RX ORDER — GABAPENTIN 100 MG/1
CAPSULE ORAL
Qty: 60 CAPSULE | Refills: 0 | Status: SHIPPED | OUTPATIENT
Start: 2020-06-15 | End: 2020-07-20

## 2020-06-15 NOTE — TELEPHONE ENCOUNTER
Last Seen:   10/11/2019    Follow Up:  none    Last approved:   05/18/2020         Qty:  60    Refills:   0    Donald:     Current 5-                         Req#

## 2020-06-25 ENCOUNTER — TELEPHONE (OUTPATIENT)
Dept: INTERNAL MEDICINE | Facility: CLINIC | Age: 85
End: 2020-06-25

## 2020-07-18 DIAGNOSIS — G89.29 CHRONIC RIGHT-SIDED LOW BACK PAIN WITH RIGHT-SIDED SCIATICA: Chronic | ICD-10-CM

## 2020-07-18 DIAGNOSIS — M54.41 CHRONIC RIGHT-SIDED LOW BACK PAIN WITH RIGHT-SIDED SCIATICA: Chronic | ICD-10-CM

## 2020-07-20 RX ORDER — GABAPENTIN 100 MG/1
CAPSULE ORAL
Qty: 60 CAPSULE | Refills: 1 | Status: SHIPPED | OUTPATIENT
Start: 2020-07-20 | End: 2020-07-31 | Stop reason: SDUPTHER

## 2020-07-28 NOTE — TELEPHONE ENCOUNTER
Last seen:10/11/19  Next appt:none on file  Last apprd:06/15/20 #60  0RF  Donald current 5/18/20  Req #:    
Please call daughter to schedule a video visit (or telephone visit).  Let her know I sent in the patient's prescription.    Also go on and schedule her for her Medicare wellness in October  
TALKED TO DAUGHTER ABOUT PT'S UP COMING APPOINTMENTS  
Nasal mucosa clear.  Mouth with normal mucosa  Throat has no vesicles, no oropharyngeal exudates and uvula is midline.

## 2020-07-31 ENCOUNTER — OFFICE VISIT (OUTPATIENT)
Dept: INTERNAL MEDICINE | Facility: CLINIC | Age: 85
End: 2020-07-31

## 2020-07-31 DIAGNOSIS — I10 BENIGN ESSENTIAL HYPERTENSION: Primary | ICD-10-CM

## 2020-07-31 DIAGNOSIS — F32.0 MILD MAJOR DEPRESSION, SINGLE EPISODE (HCC): ICD-10-CM

## 2020-07-31 DIAGNOSIS — Z91.81 AT HIGH RISK FOR FALLS: ICD-10-CM

## 2020-07-31 DIAGNOSIS — G89.29 CHRONIC RIGHT-SIDED LOW BACK PAIN WITH RIGHT-SIDED SCIATICA: Chronic | ICD-10-CM

## 2020-07-31 DIAGNOSIS — R41.3 MEMORY LOSS: ICD-10-CM

## 2020-07-31 DIAGNOSIS — H35.30 MACULAR DEGENERATION (SENILE) OF RETINA: Chronic | ICD-10-CM

## 2020-07-31 DIAGNOSIS — M54.41 CHRONIC RIGHT-SIDED LOW BACK PAIN WITH RIGHT-SIDED SCIATICA: Chronic | ICD-10-CM

## 2020-07-31 DIAGNOSIS — K21.9 GASTROESOPHAGEAL REFLUX DISEASE WITHOUT ESOPHAGITIS: ICD-10-CM

## 2020-07-31 DIAGNOSIS — E03.9 HYPOTHYROIDISM (ACQUIRED): ICD-10-CM

## 2020-07-31 DIAGNOSIS — E78.00 HYPERCHOLESTEREMIA: ICD-10-CM

## 2020-07-31 DIAGNOSIS — N39.41 URGE INCONTINENCE OF URINE: ICD-10-CM

## 2020-07-31 DIAGNOSIS — R63.4 WEIGHT LOSS: ICD-10-CM

## 2020-07-31 DIAGNOSIS — K59.01 CONSTIPATION BY DELAYED COLONIC TRANSIT: ICD-10-CM

## 2020-07-31 PROCEDURE — 99214 OFFICE O/P EST MOD 30 MIN: CPT | Performed by: INTERNAL MEDICINE

## 2020-07-31 RX ORDER — SOLIFENACIN SUCCINATE 10 MG/1
10 TABLET, FILM COATED ORAL DAILY
Qty: 90 TABLET | Refills: 1
Start: 2020-07-31

## 2020-07-31 RX ORDER — POLYETHYLENE GLYCOL 3350 17 G/17G
17 POWDER, FOR SOLUTION ORAL DAILY
Qty: 1020 G | Refills: 5
Start: 2020-07-31 | End: 2020-08-30

## 2020-07-31 RX ORDER — GABAPENTIN 100 MG/1
100 CAPSULE ORAL NIGHTLY
Qty: 30 CAPSULE | Refills: 2 | Status: ON HOLD | OUTPATIENT
Start: 2020-07-31 | End: 2020-10-01 | Stop reason: SDUPTHER

## 2020-07-31 NOTE — PATIENT INSTRUCTIONS
"Patient Instructions   Problem List Items Addressed This Visit        Cardiovascular and Mediastinum    Hypercholesteremia    Overview     7/31/2020 Sandra Adkins MD    Continue low-fat low sugar diet.         Benign essential hypertension - Primary    Overview     7/31/2020 Sandra Adkins MD    Continue lifestyle control of blood pressure.   Continue low salt diet.    Check the blood pressure occasionally to make sure it is still under good control.            Digestive    Constipation by delayed colonic transit    Overview     7/31/2020 Sandra Adkins MD    Take Miralax every day.  Continue taking the stool softener daily.  Drink plenty of fluids.         Gastroesophageal reflux disease without esophagitis    Overview     7/31/2020 Sandra Adkins MD    Continue omeprazole daily. Continue to avoid lying down after eating.         Relevant Medications    omeprazole (priLOSEC) 40 MG capsule       Endocrine    Hypothyroidism (acquired)    Overview     7/31/2020 Sandra Adkins MD    Continue levothyroxine daily.         Relevant Medications    levothyroxine (SYNTHROID, LEVOTHROID) 100 MCG tablet       Nervous and Auditory    Chronic right-sided low back pain with right-sided sciatica (Chronic)    Overview     7/31/2020 Sandra Adkins MD    Decrease gabapentin to 1 capsule every evening.  Continue meloxicam daily.  May also take tylenol as needed for pain.         Relevant Medications    gabapentin (NEURONTIN) 100 MG capsule       Genitourinary    Urge incontinence of urine    Overview     7/31/2020 Sandra Adkins MD    Take 1 Vesicare tablet daily.            Other    Macular degeneration (senile) of retina (Chronic)    Overview     7/31/2020 Sandra Adkins MD    Continue follow-up with the ophthalmologist.  May try \"books on tape\" from the library.         Weight loss    Overview     7/31/2020 Sandra Adkins MD    Continue eating 3 meals a day.  May drink an Ensure or boost every day if the " appetite fades.         Memory loss    Overview     7/31/2020 Sandra Adkins MD    Continue donepezil.           At high risk for falls    Overview     7/31/2020 Sandra Adkins MD    Multifactorial fall risk.  We reviewed fall risk with medications.  We will decrease gabapentin to 1 capsule every evening.  She is tolerating her current regimen and is noting benefit with her medications.         Mild major depression, single episode (CMS/HCC)    Overview     7/31/2020 Sandra Adkins MD    Continue sertraline daily.  Physical activity also helps.         Relevant Medications    donepezil (ARICEPT) 5 MG tablet    sertraline (ZOLOFT) 50 MG tablet             Fall Prevention in the Home, Adult  Falls can cause injuries and can affect people from all age groups. There are many simple things that you can do to make your home safe and to help prevent falls. Ask for help when making these changes, if needed.  What actions can I take to prevent falls?  General instructions  · Use good lighting in all rooms. Replace any light bulbs that burn out.  · Turn on lights if it is dark. Use night-lights.  · Place frequently used items in easy-to-reach places. Lower the shelves around your home if necessary.  · Set up furniture so that there are clear paths around it. Avoid moving your furniture around.  · Remove throw rugs and other tripping hazards from the floor.  · Avoid walking on wet floors.  · Fix any uneven floor surfaces.  · Add color or contrast paint or tape to grab bars and handrails in your home. Place contrasting color strips on the first and last steps of stairways.  · When you use a stepladder, make sure that it is completely opened and that the sides are firmly locked. Have someone hold the ladder while you are using it. Do not climb a closed stepladder.  · Be aware of any and all pets.  What can I do in the bathroom?         · Keep the floor dry. Immediately clean up any water that spills onto the  floor.  · Remove soap buildup in the tub or shower on a regular basis.  · Use non-skid mats or decals on the floor of the tub or shower.  · Attach bath mats securely with double-sided, non-slip rug tape.  · If you need to sit down while you are in the shower, use a plastic, non-slip stool.  · Install grab bars by the toilet and in the tub and shower. Do not use towel bars as grab bars.  What can I do in the bedroom?  · Make sure that a bedside light is easy to reach.  · Do not use oversized bedding that drapes onto the floor.  · Have a firm chair that has side arms to use for getting dressed.  What can I do in the kitchen?  · Clean up any spills right away.  · If you need to reach for something above you, use a sturdy step stool that has a grab bar.  · Keep electrical cables out of the way.  · Do not use floor polish or wax that makes floors slippery. If you must use wax, make sure that it is non-skid floor wax.  What can I do in the stairways?  · Do not leave any items on the stairs.  · Make sure that you have a light switch at the top of the stairs and the bottom of the stairs. Have them installed if you do not have them.  · Make sure that there are handrails on both sides of the stairs. Fix handrails that are broken or loose. Make sure that handrails are as long as the stairways.  · Install non-slip stair treads on all stairs in your home.  · Avoid having throw rugs at the top or bottom of stairways, or secure the rugs with carpet tape to prevent them from moving.  · Choose a carpet design that does not hide the edge of steps on the stairway.  · Check any carpeting to make sure that it is firmly attached to the stairs. Fix any carpet that is loose or worn.  What can I do on the outside of my home?  · Use bright outdoor lighting.  · Regularly repair the edges of walkways and driveways and fix any cracks.  · Remove high doorway thresholds.  · Trim any shrubbery on the main path into your home.  · Regularly check  that handrails are securely fastened and in good repair. Both sides of any steps should have handrails.  · Install guardrails along the edges of any raised decks or porches.  · Clear walkways of debris and clutter, including tools and rocks.  · Have leaves, snow, and ice cleared regularly.  · Use sand or salt on walkways during winter months.  · In the garage, clean up any spills right away, including grease or oil spills.  What other actions can I take?  · Wear closed-toe shoes that fit well and support your feet. Wear shoes that have rubber soles or low heels.  · Use mobility aids as needed, such as canes, walkers, scooters, and crutches.  · Review your medicines with your health care provider. Some medicines can cause dizziness or changes in blood pressure, which increase your risk of falling.  Talk with your health care provider about other ways that you can decrease your risk of falls. This may include working with a physical therapist or  to improve your strength, balance, and endurance.  Where to find more information  · Centers for Disease Control and Prevention, [place]: https://www.cdc.gov  · National [place] [employer]: https://wo0ocit.chary.nih.gov  Contact a health care provider if:  · You are afraid of falling at home.  · You feel weak, drowsy, or dizzy at home.  · You fall at home.  Summary  · There are many simple things that you can do to make your home safe and to help prevent falls.  · Ways to make your home safe include removing tripping hazards and installing grab bars in the bathroom.  · Ask for help when making these changes in your home.  This information is not intended to replace advice given to you by your health care provider. Make sure you discuss any questions you have with your health care provider.  Document Released: 12/08/2003 Document Revised: 11/30/2018 Document Reviewed: 08/02/2018  Elsevier Patient Education © 2020 [employer]

## 2020-07-31 NOTE — PROGRESS NOTES
[facility]     Darline Reed  2/8/1926   [id number]      Patient Care Team:  Sandra Adkins MD as PCP - General  Sandra Adkins MD as PCP - Family Medicine  Rob Woods MD as Consulting Physician (Orthopedic Surgery)  Shade Nesbitt MD as Consulting Physician (Ophthalmology)    You have chosen to receive care through a telephone visit. Do you consent to use a telephone visit for your medical care today? Yes     CC: Weight loss, and follow-up chronic conditions including hypertension, back pain, memory, hypothyroidism, urine urgency      HPI  Patient is a 89 y.o. female presents with weight loss,about 7 lb since January.  She states that she lost it all early this year when she felt bad for several weeks.  Has not lost any more recently.  Appetite is good and she is eating 3 meals a day.    CHRONIC CONDITIONS    Constipation-but hasn't been taking miralax every day.  She is taking a softener daily.    Daughter states that the blood pressure was good the last time she checked it, but she has not checked it for a while.  It has been controlled by lifestyle.  Avoiding salt in the diet.    Hypercholesterolemia has been controlled by low-fat low sugar diet.    Back pain improved.  She has not needed tramadol in months.  She does take meloxicam daily.  She is still taking 2 gabapentin capsules every evening.  She is sleeping okay.    Shortness of breath at baseline. No chest pain. No cough. No edema.  Has not been using Bevespi inhaler and does not feel she needs it right now.    Depression helped by sertraline. Daughter says she is doing well.     Daughter Shasha who fixes her pillbox says that she had not been taking the Vesicare for a while, but then the patient agreed to let her put that in the pillbox again so she is now taking 1 tablet daily.  It does help urine urgency.    GERD is doing well on omeprazole.    Past Medical History:   Diagnosis Date   • Abnormal PFTs 2010    Restriction  and obstruction on PFT/s   • Arthritis    • Asthma    • Cachexia (CMS/HCC) 3/19/2019   • COPD (chronic obstructive pulmonary disease) (CMS/HCC)    • Diverticulitis    • Dyspnea    • Falls frequently 3/19/2019   • GERD (gastroesophageal reflux disease)    • Hypercholesteremia    • Hypertension 6/22/2016   • Hypothyroid    • Intractable acute post-traumatic headache 3/19/2019   • Intractable pain 6/19/2016   • Osteoporosis    • Positive PPD    • Right knee meniscal tear 2004   • Right knee sprain     conservative   • Scoliosis    • Weight loss 6/19/2016       Past Surgical History:   Procedure Laterality Date   • BLEPHAROPLASTY     • CATARACT EXTRACTION  1999   • COSMETIC SURGERY     • TONSILLECTOMY  1947   • TONSILLECTOMY AND ADENOIDECTOMY         Family History   Problem Relation Age of Onset   • Coronary artery disease Mother    • Arthritis Mother    • Diabetes Mother    • Hyperlipidemia Mother    • Cardiomyopathy Son        Social History     Socioeconomic History   • Marital status:      Spouse name: Not on file   • Number of children: Not on file   • Years of education: Not on file   • Highest education level: Not on file   Tobacco Use   • Smoking status: Never Smoker   • Smokeless tobacco: Never Used   Substance and Sexual Activity   • Alcohol use: No   • Drug use: No   • Sexual activity: Never     Comment:        Allergies   Allergen Reactions   • Codeine    • Penicillins        Review of Systems:     Review of Systems   Constitutional: Negative for chills, fatigue and fever.   HENT: Negative for congestion, ear pain and sinus pressure.    Eyes: Positive for visual disturbance.   Respiratory: Positive for shortness of breath. Negative for cough, chest tightness and wheezing.    Cardiovascular: Negative for chest pain, palpitations and leg swelling.   Gastrointestinal: Positive for constipation. Negative for abdominal pain, blood in stool, diarrhea and GERD.   Genitourinary: Positive for frequency  and urgency. Negative for dysuria.   Musculoskeletal: Positive for back pain.   Skin: Negative for color change.   Allergic/Immunologic: Negative for environmental allergies.   Neurological: Negative for dizziness, speech difficulty and headache.   Psychiatric/Behavioral: Negative for decreased concentration, suicidal ideas and depressed mood. The patient is not nervous/anxious.        Vital Signs  There were no vitals filed for this visit.  There is no height or weight on file to calculate BMI.      Current Outpatient Medications:   •  Cholecalciferol (VITAMIN D3) 2000 units capsule, 1 capsule by mouth  daily, Disp: , Rfl:   •  Cyanocobalamin 5000 MCG tablet dispersible, take 1 tablet daily, Disp: , Rfl:   •  docusate sodium (COLACE) 250 MG capsule, Take 2 capsules by mouth Daily., Disp: , Rfl:   •  donepezil (ARICEPT) 5 MG tablet, TAKE 1 TABLET EVERY NIGHT, Disp: 90 tablet, Rfl: 4  •  gabapentin (NEURONTIN) 100 MG capsule, Take 1 capsule by mouth Every Night., Disp: 30 capsule, Rfl: 2  •  levothyroxine (SYNTHROID, LEVOTHROID) 100 MCG tablet, TAKE 1 TABLET DAILY, Disp: 90 tablet, Rfl: 4  •  Magnesium Citrate 100 MG tablet, take 1 tablet every evening, Disp: , Rfl:   •  meloxicam (MOBIC) 7.5 MG tablet, TAKE 1 TABLET DAILY, Disp: 90 tablet, Rfl: 4  •  Multiple Vitamins-Minerals (PRESERVISION AREDS 2+MULTI VIT) capsule, Take one capsule by oral route once a day, Disp: , Rfl:   •  omeprazole (priLOSEC) 40 MG capsule, Take 1 capsule by mouth Daily., Disp: 90 capsule, Rfl: 4  •  sertraline (ZOLOFT) 50 MG tablet, TAKE ONE-HALF (1/2) TABLET DAILY, Disp: 46 tablet, Rfl: 4  •  solifenacin (VESICARE) 10 MG tablet, Take 1 tablet by mouth Daily., Disp: 90 tablet, Rfl: 1  •  polyethylene glycol (MIRALAX) packet, Take 17 g by mouth Daily for 30 days., Disp: 1020 g, Rfl: 5  •  Selenium 200 MCG tablet, Take 1 tablet by mouth Daily., Disp: 90 each, Rfl: 1    Physical Exam:    Physical Exam   Constitutional: She is oriented to  person, place, and time.   Pulmonary/Chest: Effort normal.   Neurological: She is alert and oriented to person, place, and time.   Psychiatric: She has a normal mood and affect. Her behavior is normal. Thought content normal.        ACE III MINI        Results Review:    None    CMP:  Lab Results   Component Value Date    BUN 15 04/03/2019    CREATININE 0.85 04/03/2019    EGFRIFNONA 62 04/03/2019    BCR 17.6 04/03/2019     04/03/2019    K 4.0 04/03/2019    CO2 28.0 04/03/2019    CALCIUM 8.9 04/03/2019    ALBUMIN 4.01 04/03/2019    BILITOT 0.7 04/03/2019    ALKPHOS 69 04/03/2019    AST 23 04/03/2019    ALT 14 04/03/2019     HbA1c:  No results found for: HGBA1C  Microalbumin:  No results found for: MICROALBUR, POCMALB, POCCREAT  Lipid Panel  Lab Results   Component Value Date    CHOL 214 (H) 04/03/2019    TRIG 101 04/03/2019    HDL 62 (H) 04/03/2019     (H) 04/03/2019    AST 23 04/03/2019    ALT 14 04/03/2019       Medication Review: Medications reviewed and noted  Patient Instructions   Problem List Items Addressed This Visit        Cardiovascular and Mediastinum    Hypercholesteremia    Overview     7/31/2020 Sandra Adkins MD    Continue low-fat low sugar diet.         Benign essential hypertension - Primary    Overview     7/31/2020 Sandra Adkins MD    Continue lifestyle control of blood pressure.   Continue low salt diet.    Check the blood pressure occasionally to make sure it is still under good control.            Digestive    Constipation by delayed colonic transit    Overview     7/31/2020 Sandra Adkins MD    Take Miralax every day.  Continue taking the stool softener daily.  Drink plenty of fluids.         Gastroesophageal reflux disease without esophagitis    Overview     7/31/2020 Sandra Adkins MD    Continue omeprazole daily. Continue to avoid lying down after eating.         Relevant Medications    omeprazole (priLOSEC) 40 MG capsule       Endocrine    Hypothyroidism  "(acquired)    Overview     7/31/2020 Sandra Adkins MD    Continue levothyroxine daily.         Relevant Medications    levothyroxine (SYNTHROID, LEVOTHROID) 100 MCG tablet       Nervous and Auditory    Chronic right-sided low back pain with right-sided sciatica (Chronic)    Overview     7/31/2020 Sandra Adkins MD    Decrease gabapentin to 1 capsule every evening.  Continue meloxicam daily.  May also take tylenol as needed for pain.         Relevant Medications    gabapentin (NEURONTIN) 100 MG capsule       Genitourinary    Urge incontinence of urine    Overview     7/31/2020 Sadnra Adkins MD    Take 1 Vesicare tablet daily.            Other    Macular degeneration (senile) of retina (Chronic)    Overview     7/31/2020 Sandra Adkins MD    Continue follow-up with the ophthalmologist.  May try \"books on tape\" from the library.         Weight loss    Overview     7/31/2020 Sandra Adkins MD    Continue eating 3 meals a day.  May drink an Ensure or boost every day if the appetite fades.         Memory loss    Overview     7/31/2020 Sandra Adkins MD    Continue donepezil.           At high risk for falls    Overview     7/31/2020 Sandra Adkins MD    Multifactorial fall risk.  We reviewed fall risk with medications.  We will decrease gabapentin to 1 capsule every evening.  She is tolerating her current regimen and is noting benefit with her medications.         Mild major depression, single episode (CMS/HCC)    Overview     7/31/2020 Sandra Adkins MD    Continue sertraline daily.  Physical activity also helps.         Relevant Medications    donepezil (ARICEPT) 5 MG tablet    sertraline (ZOLOFT) 50 MG tablet             Diagnosis Plan   1. Benign essential hypertension     2. Weight loss     3. Constipation by delayed colonic transit     4. Gastroesophageal reflux disease without esophagitis     5. Mild major depression, single episode (CMS/HCC)     6. Chronic right-sided low back pain with " right-sided sciatica  gabapentin (NEURONTIN) 100 MG capsule   7. Memory loss     8. Urge incontinence of urine     9. Hypothyroidism (acquired)     10. Hypercholesteremia     11. At high risk for falls     12. Macular degeneration (senile) of retina         Plan of care reviewed with patient at the conclusion of today's visit. Education was provided regarding diagnosis, management, and any prescribed or recommended OTC medications.Patient verbalizes understanding of and agreement with management plan.      This visit has been rescheduled as a phone visit to comply with patient safety concerns in accordance with CDC recommendations. Total time of discussion was 22 minutes.        Sandra Adkins MD

## 2020-09-17 ENCOUNTER — APPOINTMENT (OUTPATIENT)
Dept: GENERAL RADIOLOGY | Facility: HOSPITAL | Age: 85
End: 2020-09-17

## 2020-09-17 ENCOUNTER — HOSPITAL ENCOUNTER (INPATIENT)
Facility: HOSPITAL | Age: 85
LOS: 4 days | Discharge: NURSING, REHAB OR LONG-TERM CARE | End: 2020-09-22
Attending: EMERGENCY MEDICINE | Admitting: INTERNAL MEDICINE

## 2020-09-17 ENCOUNTER — HOSPITAL ENCOUNTER (OUTPATIENT)
Facility: HOSPITAL | Age: 85
Setting detail: SURGERY ADMIT
End: 2020-09-17
Attending: ORTHOPAEDIC SURGERY | Admitting: ORTHOPAEDIC SURGERY

## 2020-09-17 DIAGNOSIS — S72.032A: Primary | ICD-10-CM

## 2020-09-17 PROBLEM — G43.909 MIGRAINE: Status: RESOLVED | Noted: 2019-03-19 | Resolved: 2020-09-17

## 2020-09-17 PROBLEM — R06.09 DYSPNEA ON EXERTION: Status: RESOLVED | Noted: 2019-03-19 | Resolved: 2020-09-17

## 2020-09-17 PROBLEM — K57.30 DIVERTICULOSIS OF COLON WITHOUT HEMORRHAGE: Status: RESOLVED | Noted: 2019-03-19 | Resolved: 2020-09-17

## 2020-09-17 PROBLEM — D72.829 LEUKOCYTOSIS: Status: ACTIVE | Noted: 2020-09-17

## 2020-09-17 PROBLEM — R49.0 HOARSENESS: Status: RESOLVED | Noted: 2019-03-19 | Resolved: 2020-09-17

## 2020-09-17 PROBLEM — M25.569 PAIN, KNEE: Status: RESOLVED | Noted: 2019-03-19 | Resolved: 2020-09-17

## 2020-09-17 PROBLEM — F03.90 DEMENTIA (HCC): Status: ACTIVE | Noted: 2020-09-17
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
## 2020-09-17 LAB
ABO GROUP BLD: NORMAL
ANION GAP SERPL CALCULATED.3IONS-SCNC: 10 MMOL/L (ref 5–15)
BASOPHILS # BLD AUTO: 0.07 10*3/MM3 (ref 0–0.2)
BASOPHILS NFR BLD AUTO: 0.6 % (ref 0–1.5)
BLD GP AB SCN SERPL QL: NEGATIVE
BUN SERPL-MCNC: 16 MG/DL (ref 8–23)
BUN/CREAT SERPL: 22.5 (ref 7–25)
CALCIUM SPEC-SCNC: 9 MG/DL (ref 8.2–9.6)
CHLORIDE SERPL-SCNC: 97 MMOL/L (ref 98–107)
CO2 SERPL-SCNC: 26 MMOL/L (ref 22–29)
CREAT SERPL-MCNC: 0.71 MG/DL (ref 0.57–1)
DEPRECATED RDW RBC AUTO: 46.1 FL (ref 37–54)
EOSINOPHIL # BLD AUTO: 0.22 10*3/MM3 (ref 0–0.4)
EOSINOPHIL NFR BLD AUTO: 1.8 % (ref 0.3–6.2)
ERYTHROCYTE [DISTWIDTH] IN BLOOD BY AUTOMATED COUNT: 13.4 % (ref 12.3–15.4)
GFR SERPL CREATININE-BSD FRML MDRD: 77 ML/MIN/1.73
GLUCOSE SERPL-MCNC: 113 MG/DL (ref 65–99)
HCT VFR BLD AUTO: 37.8 % (ref 34–46.6)
HGB BLD-MCNC: 12.4 G/DL (ref 12–15.9)
IMM GRANULOCYTES # BLD AUTO: 0.07 10*3/MM3 (ref 0–0.05)
IMM GRANULOCYTES NFR BLD AUTO: 0.6 % (ref 0–0.5)
LYMPHOCYTES # BLD AUTO: 0.99 10*3/MM3 (ref 0.7–3.1)
LYMPHOCYTES NFR BLD AUTO: 8 % (ref 19.6–45.3)
MCH RBC QN AUTO: 30.7 PG (ref 26.6–33)
MCHC RBC AUTO-ENTMCNC: 32.8 G/DL (ref 31.5–35.7)
MCV RBC AUTO: 93.6 FL (ref 79–97)
MONOCYTES # BLD AUTO: 1.14 10*3/MM3 (ref 0.1–0.9)
MONOCYTES NFR BLD AUTO: 9.2 % (ref 5–12)
NEUTROPHILS NFR BLD AUTO: 79.8 % (ref 42.7–76)
NEUTROPHILS NFR BLD AUTO: 9.84 10*3/MM3 (ref 1.7–7)
NRBC BLD AUTO-RTO: 0 /100 WBC (ref 0–0.2)
PLATELET # BLD AUTO: 218 10*3/MM3 (ref 140–450)
PMV BLD AUTO: 9.7 FL (ref 6–12)
POTASSIUM SERPL-SCNC: 3.6 MMOL/L (ref 3.5–5.2)
RBC # BLD AUTO: 4.04 10*6/MM3 (ref 3.77–5.28)
RH BLD: NEGATIVE
SODIUM SERPL-SCNC: 133 MMOL/L (ref 136–145)
T&S EXPIRATION DATE: NORMAL
WBC # BLD AUTO: 12.33 10*3/MM3 (ref 3.4–10.8)

## 2020-09-17 PROCEDURE — 86900 BLOOD TYPING SEROLOGIC ABO: CPT | Performed by: ORTHOPAEDIC SURGERY

## 2020-09-17 PROCEDURE — 25010000002 ONDANSETRON PER 1 MG: Performed by: EMERGENCY MEDICINE

## 2020-09-17 PROCEDURE — 85025 COMPLETE CBC W/AUTO DIFF WBC: CPT | Performed by: EMERGENCY MEDICINE

## 2020-09-17 PROCEDURE — 86901 BLOOD TYPING SEROLOGIC RH(D): CPT | Performed by: ORTHOPAEDIC SURGERY

## 2020-09-17 PROCEDURE — 80048 BASIC METABOLIC PNL TOTAL CA: CPT | Performed by: EMERGENCY MEDICINE

## 2020-09-17 PROCEDURE — 99220 PR INITIAL OBSERVATION CARE/DAY 70 MINUTES: CPT | Performed by: INTERNAL MEDICINE

## 2020-09-17 PROCEDURE — 25010000002 HYDROMORPHONE PER 4 MG: Performed by: EMERGENCY MEDICINE

## 2020-09-17 PROCEDURE — 86850 RBC ANTIBODY SCREEN: CPT | Performed by: ORTHOPAEDIC SURGERY

## 2020-09-17 PROCEDURE — 99285 EMERGENCY DEPT VISIT HI MDM: CPT

## 2020-09-17 PROCEDURE — 73502 X-RAY EXAM HIP UNI 2-3 VIEWS: CPT

## 2020-09-17 PROCEDURE — G0378 HOSPITAL OBSERVATION PER HR: HCPCS

## 2020-09-17 RX ORDER — HYDROMORPHONE HYDROCHLORIDE 1 MG/ML
0.5 INJECTION, SOLUTION INTRAMUSCULAR; INTRAVENOUS; SUBCUTANEOUS ONCE
Status: COMPLETED | OUTPATIENT
Start: 2020-09-17 | End: 2020-09-17

## 2020-09-17 RX ORDER — ONDANSETRON 2 MG/ML
4 INJECTION INTRAMUSCULAR; INTRAVENOUS ONCE
Status: COMPLETED | OUTPATIENT
Start: 2020-09-17 | End: 2020-09-17

## 2020-09-17 RX ORDER — SODIUM CHLORIDE 0.9 % (FLUSH) 0.9 %
10 SYRINGE (ML) INJECTION AS NEEDED
Status: DISCONTINUED | OUTPATIENT
Start: 2020-09-17 | End: 2020-09-22 | Stop reason: HOSPADM

## 2020-09-17 RX ORDER — NALOXONE HCL 0.4 MG/ML
0.4 VIAL (ML) INJECTION AS NEEDED
Status: DISCONTINUED | OUTPATIENT
Start: 2020-09-17 | End: 2020-09-22 | Stop reason: HOSPADM

## 2020-09-17 RX ADMIN — ONDANSETRON 4 MG: 2 INJECTION INTRAMUSCULAR; INTRAVENOUS at 18:54

## 2020-09-17 RX ADMIN — HYDROMORPHONE HYDROCHLORIDE 0.5 MG: 1 INJECTION, SOLUTION INTRAMUSCULAR; INTRAVENOUS; SUBCUTANEOUS at 19:51

## 2020-09-18 ENCOUNTER — APPOINTMENT (OUTPATIENT)
Dept: GENERAL RADIOLOGY | Facility: HOSPITAL | Age: 85
End: 2020-09-18

## 2020-09-18 ENCOUNTER — ANESTHESIA EVENT (OUTPATIENT)
Dept: PERIOP | Facility: HOSPITAL | Age: 85
End: 2020-09-18

## 2020-09-18 ENCOUNTER — ANESTHESIA (OUTPATIENT)
Dept: PERIOP | Facility: HOSPITAL | Age: 85
End: 2020-09-18

## 2020-09-18 LAB
ABO GROUP BLD: NORMAL
ANION GAP SERPL CALCULATED.3IONS-SCNC: 8 MMOL/L (ref 5–15)
APTT PPP: 32.5 SECONDS (ref 24–37)
BACTERIA UR QL AUTO: ABNORMAL /HPF
BASOPHILS # BLD AUTO: 0.04 10*3/MM3 (ref 0–0.2)
BASOPHILS NFR BLD AUTO: 0.3 % (ref 0–1.5)
BILIRUB UR QL STRIP: NEGATIVE
BUN SERPL-MCNC: 18 MG/DL (ref 8–23)
BUN/CREAT SERPL: 28.1 (ref 7–25)
CALCIUM SPEC-SCNC: 8.6 MG/DL (ref 8.2–9.6)
CHLORIDE SERPL-SCNC: 97 MMOL/L (ref 98–107)
CLARITY UR: ABNORMAL
CO2 SERPL-SCNC: 29 MMOL/L (ref 22–29)
COLOR UR: YELLOW
CREAT SERPL-MCNC: 0.64 MG/DL (ref 0.57–1)
DEPRECATED RDW RBC AUTO: 47 FL (ref 37–54)
EOSINOPHIL # BLD AUTO: 0.01 10*3/MM3 (ref 0–0.4)
EOSINOPHIL NFR BLD AUTO: 0.1 % (ref 0.3–6.2)
ERYTHROCYTE [DISTWIDTH] IN BLOOD BY AUTOMATED COUNT: 13.3 % (ref 12.3–15.4)
GFR SERPL CREATININE-BSD FRML MDRD: 86 ML/MIN/1.73
GLUCOSE SERPL-MCNC: 91 MG/DL (ref 65–99)
GLUCOSE UR STRIP-MCNC: NEGATIVE MG/DL
HCT VFR BLD AUTO: 35.7 % (ref 34–46.6)
HGB BLD-MCNC: 11.5 G/DL (ref 12–15.9)
HGB UR QL STRIP.AUTO: NEGATIVE
HYALINE CASTS UR QL AUTO: ABNORMAL /LPF
IMM GRANULOCYTES # BLD AUTO: 0.06 10*3/MM3 (ref 0–0.05)
IMM GRANULOCYTES NFR BLD AUTO: 0.5 % (ref 0–0.5)
INR PPP: 1.04 (ref 0.85–1.16)
KETONES UR QL STRIP: NEGATIVE
LEUKOCYTE ESTERASE UR QL STRIP.AUTO: ABNORMAL
LYMPHOCYTES # BLD AUTO: 0.86 10*3/MM3 (ref 0.7–3.1)
LYMPHOCYTES NFR BLD AUTO: 6.6 % (ref 19.6–45.3)
MAGNESIUM SERPL-MCNC: 2.1 MG/DL (ref 1.7–2.3)
MCH RBC QN AUTO: 30.8 PG (ref 26.6–33)
MCHC RBC AUTO-ENTMCNC: 32.2 G/DL (ref 31.5–35.7)
MCV RBC AUTO: 95.7 FL (ref 79–97)
MONOCYTES # BLD AUTO: 1.36 10*3/MM3 (ref 0.1–0.9)
MONOCYTES NFR BLD AUTO: 10.5 % (ref 5–12)
NEUTROPHILS NFR BLD AUTO: 10.61 10*3/MM3 (ref 1.7–7)
NEUTROPHILS NFR BLD AUTO: 82 % (ref 42.7–76)
NITRITE UR QL STRIP: NEGATIVE
NRBC BLD AUTO-RTO: 0 /100 WBC (ref 0–0.2)
PH UR STRIP.AUTO: 7 [PH] (ref 5–8)
PLATELET # BLD AUTO: 208 10*3/MM3 (ref 140–450)
PMV BLD AUTO: 9.8 FL (ref 6–12)
POTASSIUM SERPL-SCNC: 3.5 MMOL/L (ref 3.5–5.2)
PROT UR QL STRIP: ABNORMAL
PROTHROMBIN TIME: 13.3 SECONDS (ref 11.5–14)
RBC # BLD AUTO: 3.73 10*6/MM3 (ref 3.77–5.28)
RBC # UR: ABNORMAL /HPF
REF LAB TEST METHOD: ABNORMAL
RH BLD: NEGATIVE
SARS-COV-2 RDRP RESP QL NAA+PROBE: NOT DETECTED
SODIUM SERPL-SCNC: 134 MMOL/L (ref 136–145)
SP GR UR STRIP: 1.01 (ref 1–1.03)
SQUAMOUS #/AREA URNS HPF: ABNORMAL /HPF
TSH SERPL DL<=0.05 MIU/L-ACNC: 2.88 UIU/ML (ref 0.27–4.2)
UROBILINOGEN UR QL STRIP: ABNORMAL
WBC # BLD AUTO: 12.94 10*3/MM3 (ref 3.4–10.8)
WBC UR QL AUTO: ABNORMAL /HPF

## 2020-09-18 PROCEDURE — 85610 PROTHROMBIN TIME: CPT | Performed by: NURSE PRACTITIONER

## 2020-09-18 PROCEDURE — 81001 URINALYSIS AUTO W/SCOPE: CPT | Performed by: NURSE PRACTITIONER

## 2020-09-18 PROCEDURE — 85730 THROMBOPLASTIN TIME PARTIAL: CPT | Performed by: NURSE PRACTITIONER

## 2020-09-18 PROCEDURE — 83735 ASSAY OF MAGNESIUM: CPT | Performed by: NURSE PRACTITIONER

## 2020-09-18 PROCEDURE — 87635 SARS-COV-2 COVID-19 AMP PRB: CPT | Performed by: NURSE PRACTITIONER

## 2020-09-18 PROCEDURE — 93010 ELECTROCARDIOGRAM REPORT: CPT | Performed by: INTERNAL MEDICINE

## 2020-09-18 PROCEDURE — 86901 BLOOD TYPING SEROLOGIC RH(D): CPT

## 2020-09-18 PROCEDURE — 25010000002 DEXAMETHASONE PER 1 MG: Performed by: NURSE ANESTHETIST, CERTIFIED REGISTERED

## 2020-09-18 PROCEDURE — C1776 JOINT DEVICE (IMPLANTABLE): HCPCS | Performed by: ORTHOPAEDIC SURGERY

## 2020-09-18 PROCEDURE — 25010000002 ONDANSETRON PER 1 MG: Performed by: NURSE PRACTITIONER

## 2020-09-18 PROCEDURE — 0SRS0JA REPLACEMENT OF LEFT HIP JOINT, FEMORAL SURFACE WITH SYNTHETIC SUBSTITUTE, UNCEMENTED, OPEN APPROACH: ICD-10-PCS | Performed by: ORTHOPAEDIC SURGERY

## 2020-09-18 PROCEDURE — 85025 COMPLETE CBC W/AUTO DIFF WBC: CPT | Performed by: NURSE PRACTITIONER

## 2020-09-18 PROCEDURE — 25010000002 ROPIVACAINE PER 1 MG: Performed by: ORTHOPAEDIC SURGERY

## 2020-09-18 PROCEDURE — 86900 BLOOD TYPING SEROLOGIC ABO: CPT

## 2020-09-18 PROCEDURE — 71045 X-RAY EXAM CHEST 1 VIEW: CPT

## 2020-09-18 PROCEDURE — 84443 ASSAY THYROID STIM HORMONE: CPT | Performed by: NURSE PRACTITIONER

## 2020-09-18 PROCEDURE — 25010000002 HYDROMORPHONE PER 4 MG: Performed by: NURSE ANESTHETIST, CERTIFIED REGISTERED

## 2020-09-18 PROCEDURE — 99225 PR SBSQ OBSERVATION CARE/DAY 25 MINUTES: CPT | Performed by: INTERNAL MEDICINE

## 2020-09-18 PROCEDURE — 87086 URINE CULTURE/COLONY COUNT: CPT | Performed by: NURSE PRACTITIONER

## 2020-09-18 PROCEDURE — 73502 X-RAY EXAM HIP UNI 2-3 VIEWS: CPT

## 2020-09-18 PROCEDURE — 25010000002 ONDANSETRON PER 1 MG: Performed by: NURSE ANESTHETIST, CERTIFIED REGISTERED

## 2020-09-18 PROCEDURE — 93005 ELECTROCARDIOGRAM TRACING: CPT | Performed by: NURSE PRACTITIONER

## 2020-09-18 PROCEDURE — 25010000002 NEOSTIGMINE 10 MG/10ML SOLUTION: Performed by: NURSE ANESTHETIST, CERTIFIED REGISTERED

## 2020-09-18 PROCEDURE — 25010000002 HYDROMORPHONE PER 4 MG: Performed by: INTERNAL MEDICINE

## 2020-09-18 PROCEDURE — 25010000002 PROPOFOL 10 MG/ML EMULSION: Performed by: NURSE ANESTHETIST, CERTIFIED REGISTERED

## 2020-09-18 PROCEDURE — 25010000002 ROPIVACAINE PER 1 MG: Performed by: NURSE ANESTHETIST, CERTIFIED REGISTERED

## 2020-09-18 PROCEDURE — 80048 BASIC METABOLIC PNL TOTAL CA: CPT | Performed by: NURSE PRACTITIONER

## 2020-09-18 PROCEDURE — 87186 SC STD MICRODIL/AGAR DIL: CPT | Performed by: NURSE PRACTITIONER

## 2020-09-18 PROCEDURE — 87088 URINE BACTERIA CULTURE: CPT | Performed by: NURSE PRACTITIONER

## 2020-09-18 DEVICE — TANDEM BIPOLAR COBALT CHROME 45MM                                    OUTER DIAMETER 28MM INNER DIAMETER
Type: IMPLANTABLE DEVICE | Site: HIP | Status: FUNCTIONAL
Brand: TANDEM

## 2020-09-18 DEVICE — IMPLANTABLE DEVICE: Type: IMPLANTABLE DEVICE | Site: HIP | Status: FUNCTIONAL

## 2020-09-18 DEVICE — SUT FW #2 W/TPR NDL 1/2 CIR 38IN 97CM 26.5MM BLU: Type: IMPLANTABLE DEVICE | Site: HIP | Status: FUNCTIONAL

## 2020-09-18 DEVICE — SYNERGY POROUS PLUS HA STANDARD                                    OFFSET SIZE 12
Type: IMPLANTABLE DEVICE | Site: HIP | Status: FUNCTIONAL
Brand: SYNERGY

## 2020-09-18 DEVICE — COBALT CHROME 12/14 TAPER FEMORAL                                    HEAD 28MM + 0: Type: IMPLANTABLE DEVICE | Site: HIP | Status: FUNCTIONAL

## 2020-09-18 RX ORDER — OXYCODONE HYDROCHLORIDE 5 MG/1
5 TABLET ORAL EVERY 4 HOURS PRN
Status: DISCONTINUED | OUTPATIENT
Start: 2020-09-18 | End: 2020-09-22 | Stop reason: HOSPADM

## 2020-09-18 RX ORDER — DONEPEZIL HYDROCHLORIDE 5 MG/1
5 TABLET, FILM COATED ORAL NIGHTLY
Status: DISCONTINUED | OUTPATIENT
Start: 2020-09-18 | End: 2020-09-22 | Stop reason: HOSPADM

## 2020-09-18 RX ORDER — DEXAMETHASONE SODIUM PHOSPHATE 4 MG/ML
INJECTION, SOLUTION INTRA-ARTICULAR; INTRALESIONAL; INTRAMUSCULAR; INTRAVENOUS; SOFT TISSUE AS NEEDED
Status: DISCONTINUED | OUTPATIENT
Start: 2020-09-18 | End: 2020-09-18 | Stop reason: SURG

## 2020-09-18 RX ORDER — ACETAMINOPHEN 325 MG/1
650 TABLET ORAL EVERY 4 HOURS PRN
Status: DISCONTINUED | OUTPATIENT
Start: 2020-09-18 | End: 2020-09-20

## 2020-09-18 RX ORDER — DOCUSATE SODIUM 100 MG/1
200 CAPSULE, LIQUID FILLED ORAL 2 TIMES DAILY
Status: DISCONTINUED | OUTPATIENT
Start: 2020-09-18 | End: 2020-09-22 | Stop reason: HOSPADM

## 2020-09-18 RX ORDER — HYDROMORPHONE HYDROCHLORIDE 1 MG/ML
0.2 INJECTION, SOLUTION INTRAMUSCULAR; INTRAVENOUS; SUBCUTANEOUS
Status: DISCONTINUED | OUTPATIENT
Start: 2020-09-18 | End: 2020-09-18

## 2020-09-18 RX ORDER — LEVOTHYROXINE SODIUM 0.1 MG/1
100 TABLET ORAL
Status: DISCONTINUED | OUTPATIENT
Start: 2020-09-18 | End: 2020-09-22 | Stop reason: HOSPADM

## 2020-09-18 RX ORDER — BISACODYL 5 MG/1
10 TABLET, DELAYED RELEASE ORAL DAILY PRN
Status: DISCONTINUED | OUTPATIENT
Start: 2020-09-18 | End: 2020-09-22 | Stop reason: HOSPADM

## 2020-09-18 RX ORDER — ACETAMINOPHEN 160 MG/5ML
650 SOLUTION ORAL EVERY 4 HOURS PRN
Status: DISCONTINUED | OUTPATIENT
Start: 2020-09-18 | End: 2020-09-20

## 2020-09-18 RX ORDER — ONDANSETRON 2 MG/ML
4 INJECTION INTRAMUSCULAR; INTRAVENOUS EVERY 6 HOURS PRN
Status: DISCONTINUED | OUTPATIENT
Start: 2020-09-18 | End: 2020-09-22 | Stop reason: HOSPADM

## 2020-09-18 RX ORDER — NEOSTIGMINE METHYLSULFATE 1 MG/ML
INJECTION, SOLUTION INTRAVENOUS AS NEEDED
Status: DISCONTINUED | OUTPATIENT
Start: 2020-09-18 | End: 2020-09-18 | Stop reason: SURG

## 2020-09-18 RX ORDER — SODIUM CHLORIDE 9 MG/ML
50 INJECTION, SOLUTION INTRAVENOUS CONTINUOUS
Status: DISCONTINUED | OUTPATIENT
Start: 2020-09-18 | End: 2020-09-21

## 2020-09-18 RX ORDER — ROCURONIUM BROMIDE 10 MG/ML
INJECTION, SOLUTION INTRAVENOUS AS NEEDED
Status: DISCONTINUED | OUTPATIENT
Start: 2020-09-18 | End: 2020-09-18 | Stop reason: SURG

## 2020-09-18 RX ORDER — DIPHENHYDRAMINE HYDROCHLORIDE 50 MG/ML
25 INJECTION INTRAMUSCULAR; INTRAVENOUS EVERY 6 HOURS PRN
Status: DISCONTINUED | OUTPATIENT
Start: 2020-09-18 | End: 2020-09-22 | Stop reason: HOSPADM

## 2020-09-18 RX ORDER — PROPOFOL 10 MG/ML
VIAL (ML) INTRAVENOUS AS NEEDED
Status: DISCONTINUED | OUTPATIENT
Start: 2020-09-18 | End: 2020-09-18 | Stop reason: SURG

## 2020-09-18 RX ORDER — HYDROMORPHONE HYDROCHLORIDE 1 MG/ML
0.5 INJECTION, SOLUTION INTRAMUSCULAR; INTRAVENOUS; SUBCUTANEOUS
Status: DISCONTINUED | OUTPATIENT
Start: 2020-09-18 | End: 2020-09-22 | Stop reason: HOSPADM

## 2020-09-18 RX ORDER — ONDANSETRON 2 MG/ML
4 INJECTION INTRAMUSCULAR; INTRAVENOUS ONCE AS NEEDED
Status: DISCONTINUED | OUTPATIENT
Start: 2020-09-18 | End: 2020-09-18

## 2020-09-18 RX ORDER — SODIUM CHLORIDE 0.9 % (FLUSH) 0.9 %
10 SYRINGE (ML) INJECTION AS NEEDED
Status: DISCONTINUED | OUTPATIENT
Start: 2020-09-18 | End: 2020-09-22 | Stop reason: HOSPADM

## 2020-09-18 RX ORDER — ONDANSETRON 2 MG/ML
INJECTION INTRAMUSCULAR; INTRAVENOUS AS NEEDED
Status: DISCONTINUED | OUTPATIENT
Start: 2020-09-18 | End: 2020-09-18 | Stop reason: SURG

## 2020-09-18 RX ORDER — PANTOPRAZOLE SODIUM 40 MG/1
40 TABLET, DELAYED RELEASE ORAL DAILY
Status: DISCONTINUED | OUTPATIENT
Start: 2020-09-18 | End: 2020-09-22 | Stop reason: HOSPADM

## 2020-09-18 RX ORDER — DOCUSATE SODIUM 100 MG/1
100 CAPSULE, LIQUID FILLED ORAL 2 TIMES DAILY PRN
Status: DISCONTINUED | OUTPATIENT
Start: 2020-09-18 | End: 2020-09-22 | Stop reason: HOSPADM

## 2020-09-18 RX ORDER — SODIUM CHLORIDE, SODIUM LACTATE, POTASSIUM CHLORIDE, CALCIUM CHLORIDE 600; 310; 30; 20 MG/100ML; MG/100ML; MG/100ML; MG/100ML
9 INJECTION, SOLUTION INTRAVENOUS CONTINUOUS
Status: DISCONTINUED | OUTPATIENT
Start: 2020-09-18 | End: 2020-09-21

## 2020-09-18 RX ORDER — ROPIVACAINE HYDROCHLORIDE 5 MG/ML
INJECTION, SOLUTION EPIDURAL; INFILTRATION; PERINEURAL
Status: COMPLETED | OUTPATIENT
Start: 2020-09-18 | End: 2020-09-18

## 2020-09-18 RX ORDER — CLINDAMYCIN PHOSPHATE 900 MG/50ML
900 INJECTION INTRAVENOUS ONCE
Status: COMPLETED | OUTPATIENT
Start: 2020-09-18 | End: 2020-09-18

## 2020-09-18 RX ORDER — AMOXICILLIN 250 MG
2 CAPSULE ORAL 2 TIMES DAILY PRN
Status: DISCONTINUED | OUTPATIENT
Start: 2020-09-18 | End: 2020-09-22 | Stop reason: HOSPADM

## 2020-09-18 RX ORDER — ROPIVACAINE HYDROCHLORIDE 2 MG/ML
8 INJECTION, SOLUTION EPIDURAL; INFILTRATION CONTINUOUS
Status: DISCONTINUED | OUTPATIENT
Start: 2020-09-18 | End: 2020-09-21

## 2020-09-18 RX ORDER — BISACODYL 10 MG
10 SUPPOSITORY, RECTAL RECTAL DAILY PRN
Status: DISCONTINUED | OUTPATIENT
Start: 2020-09-18 | End: 2020-09-22 | Stop reason: HOSPADM

## 2020-09-18 RX ORDER — SODIUM CHLORIDE 0.9 % (FLUSH) 0.9 %
10 SYRINGE (ML) INJECTION EVERY 12 HOURS SCHEDULED
Status: DISCONTINUED | OUTPATIENT
Start: 2020-09-18 | End: 2020-09-22 | Stop reason: HOSPADM

## 2020-09-18 RX ORDER — GLYCOPYRROLATE 0.2 MG/ML
INJECTION INTRAMUSCULAR; INTRAVENOUS AS NEEDED
Status: DISCONTINUED | OUTPATIENT
Start: 2020-09-18 | End: 2020-09-18 | Stop reason: SURG

## 2020-09-18 RX ORDER — CLINDAMYCIN PHOSPHATE 900 MG/50ML
900 INJECTION INTRAVENOUS EVERY 8 HOURS
Status: COMPLETED | OUTPATIENT
Start: 2020-09-18 | End: 2020-09-19

## 2020-09-18 RX ORDER — ACETAMINOPHEN 650 MG/1
650 SUPPOSITORY RECTAL EVERY 4 HOURS PRN
Status: DISCONTINUED | OUTPATIENT
Start: 2020-09-18 | End: 2020-09-20

## 2020-09-18 RX ORDER — FAMOTIDINE 20 MG/1
20 TABLET, FILM COATED ORAL ONCE
Status: COMPLETED | OUTPATIENT
Start: 2020-09-18 | End: 2020-09-18

## 2020-09-18 RX ORDER — ONDANSETRON 4 MG/1
4 TABLET, FILM COATED ORAL EVERY 6 HOURS PRN
Status: DISCONTINUED | OUTPATIENT
Start: 2020-09-18 | End: 2020-09-22 | Stop reason: HOSPADM

## 2020-09-18 RX ORDER — FENTANYL CITRATE 50 UG/ML
25 INJECTION, SOLUTION INTRAMUSCULAR; INTRAVENOUS
Status: DISCONTINUED | OUTPATIENT
Start: 2020-09-18 | End: 2020-09-18

## 2020-09-18 RX ORDER — ACETAMINOPHEN 325 MG/1
650 TABLET ORAL EVERY 4 HOURS PRN
Status: DISCONTINUED | OUTPATIENT
Start: 2020-09-18 | End: 2020-09-22 | Stop reason: HOSPADM

## 2020-09-18 RX ORDER — OXYBUTYNIN CHLORIDE 10 MG/1
10 TABLET, EXTENDED RELEASE ORAL DAILY
Status: DISCONTINUED | OUTPATIENT
Start: 2020-09-18 | End: 2020-09-19

## 2020-09-18 RX ORDER — NALOXONE HCL 0.4 MG/ML
0.1 VIAL (ML) INJECTION
Status: DISCONTINUED | OUTPATIENT
Start: 2020-09-18 | End: 2020-09-22 | Stop reason: HOSPADM

## 2020-09-18 RX ORDER — OXYCODONE AND ACETAMINOPHEN 7.5; 325 MG/1; MG/1
2 TABLET ORAL EVERY 4 HOURS PRN
Status: DISCONTINUED | OUTPATIENT
Start: 2020-09-18 | End: 2020-09-22 | Stop reason: HOSPADM

## 2020-09-18 RX ORDER — HYDROMORPHONE HYDROCHLORIDE 1 MG/ML
0.25 INJECTION, SOLUTION INTRAMUSCULAR; INTRAVENOUS; SUBCUTANEOUS
Status: DISPENSED | OUTPATIENT
Start: 2020-09-18 | End: 2020-09-20

## 2020-09-18 RX ORDER — LIDOCAINE HYDROCHLORIDE 10 MG/ML
INJECTION, SOLUTION EPIDURAL; INFILTRATION; INTRACAUDAL; PERINEURAL AS NEEDED
Status: DISCONTINUED | OUTPATIENT
Start: 2020-09-18 | End: 2020-09-18 | Stop reason: SURG

## 2020-09-18 RX ORDER — SERTRALINE HYDROCHLORIDE 25 MG/1
25 TABLET, FILM COATED ORAL DAILY
Status: DISCONTINUED | OUTPATIENT
Start: 2020-09-18 | End: 2020-09-22 | Stop reason: HOSPADM

## 2020-09-18 RX ORDER — DIPHENHYDRAMINE HCL 25 MG
25 CAPSULE ORAL EVERY 6 HOURS PRN
Status: DISCONTINUED | OUTPATIENT
Start: 2020-09-18 | End: 2020-09-22 | Stop reason: HOSPADM

## 2020-09-18 RX ORDER — SODIUM CHLORIDE 9 MG/ML
50 INJECTION, SOLUTION INTRAVENOUS CONTINUOUS
Status: ACTIVE | OUTPATIENT
Start: 2020-09-18 | End: 2020-09-18

## 2020-09-18 RX ADMIN — SODIUM CHLORIDE, POTASSIUM CHLORIDE, SODIUM LACTATE AND CALCIUM CHLORIDE: 600; 310; 30; 20 INJECTION, SOLUTION INTRAVENOUS at 14:41

## 2020-09-18 RX ADMIN — ROPIVACAINE HYDROCHLORIDE 8 ML/HR: 2 INJECTION, SOLUTION EPIDURAL; INFILTRATION at 15:12

## 2020-09-18 RX ADMIN — CLINDAMYCIN IN 5 PERCENT DEXTROSE 900 MG: 18 INJECTION, SOLUTION INTRAVENOUS at 20:12

## 2020-09-18 RX ADMIN — HYDROMORPHONE HYDROCHLORIDE 0.2 MG: 1 INJECTION, SOLUTION INTRAMUSCULAR; INTRAVENOUS; SUBCUTANEOUS at 15:29

## 2020-09-18 RX ADMIN — CLINDAMYCIN PHOSPHATE 900 MG: 18 INJECTION, SOLUTION INTRAVENOUS at 13:33

## 2020-09-18 RX ADMIN — DONEPEZIL HYDROCHLORIDE 5 MG: 5 TABLET ORAL at 20:18

## 2020-09-18 RX ADMIN — SODIUM CHLORIDE, POTASSIUM CHLORIDE, SODIUM LACTATE AND CALCIUM CHLORIDE 9 ML/HR: 600; 310; 30; 20 INJECTION, SOLUTION INTRAVENOUS at 12:20

## 2020-09-18 RX ADMIN — ONDANSETRON 4 MG: 2 INJECTION INTRAMUSCULAR; INTRAVENOUS at 14:34

## 2020-09-18 RX ADMIN — ONDANSETRON 4 MG: 2 INJECTION INTRAMUSCULAR; INTRAVENOUS at 00:08

## 2020-09-18 RX ADMIN — OXYCODONE HYDROCHLORIDE 5 MG: 5 TABLET ORAL at 20:13

## 2020-09-18 RX ADMIN — TRANEXAMIC ACID 1000 MG: 100 INJECTION, SOLUTION INTRAVENOUS at 14:23

## 2020-09-18 RX ADMIN — HYDROMORPHONE HYDROCHLORIDE 0.25 MG: 1 INJECTION, SOLUTION INTRAMUSCULAR; INTRAVENOUS; SUBCUTANEOUS at 08:14

## 2020-09-18 RX ADMIN — HYDROMORPHONE HYDROCHLORIDE 0.25 MG: 1 INJECTION, SOLUTION INTRAMUSCULAR; INTRAVENOUS; SUBCUTANEOUS at 00:08

## 2020-09-18 RX ADMIN — GLYCOPYRROLATE 0.4 MG: 0.2 INJECTION INTRAMUSCULAR; INTRAVENOUS at 14:38

## 2020-09-18 RX ADMIN — DOCUSATE SODIUM 200 MG: 100 CAPSULE, LIQUID FILLED ORAL at 20:13

## 2020-09-18 RX ADMIN — SODIUM CHLORIDE 50 ML/HR: 9 INJECTION, SOLUTION INTRAVENOUS at 15:58

## 2020-09-18 RX ADMIN — ROPIVACAINE HYDROCHLORIDE 17.5 ML: 5 INJECTION, SOLUTION EPIDURAL; INFILTRATION; PERINEURAL at 09:45

## 2020-09-18 RX ADMIN — ROCURONIUM BROMIDE 30 MG: 10 INJECTION INTRAVENOUS at 13:28

## 2020-09-18 RX ADMIN — FAMOTIDINE 20 MG: 20 TABLET, FILM COATED ORAL at 12:33

## 2020-09-18 RX ADMIN — SODIUM CHLORIDE 50 ML/HR: 9 INJECTION, SOLUTION INTRAVENOUS at 00:42

## 2020-09-18 RX ADMIN — OXYBUTYNIN CHLORIDE 10 MG: 10 TABLET, EXTENDED RELEASE ORAL at 08:06

## 2020-09-18 RX ADMIN — SODIUM CHLORIDE, PRESERVATIVE FREE 10 ML: 5 INJECTION INTRAVENOUS at 00:41

## 2020-09-18 RX ADMIN — LIDOCAINE HYDROCHLORIDE 50 MG: 10 INJECTION, SOLUTION EPIDURAL; INFILTRATION; INTRACAUDAL; PERINEURAL at 13:28

## 2020-09-18 RX ADMIN — PROPOFOL 100 MG: 10 INJECTION, EMULSION INTRAVENOUS at 13:28

## 2020-09-18 RX ADMIN — TRANEXAMIC ACID 1000 MG: 100 INJECTION, SOLUTION INTRAVENOUS at 13:47

## 2020-09-18 RX ADMIN — DEXAMETHASONE SODIUM PHOSPHATE 4 MG: 4 INJECTION, SOLUTION INTRAMUSCULAR; INTRAVENOUS at 13:28

## 2020-09-18 RX ADMIN — ACETAMINOPHEN 650 MG: 325 TABLET, FILM COATED ORAL at 20:12

## 2020-09-18 RX ADMIN — HYDROMORPHONE HYDROCHLORIDE 0.2 MG: 1 INJECTION, SOLUTION INTRAMUSCULAR; INTRAVENOUS; SUBCUTANEOUS at 15:37

## 2020-09-18 RX ADMIN — NEOSTIGMINE 2.5 MG: 1 INJECTION INTRAVENOUS at 14:38

## 2020-09-18 NOTE — PROGRESS NOTES
Discharge Planning Assessment  [facility]     Patient Name: Darline Reed  MRN: [id number]  Today's Date: 9/18/2020    Admit Date: 9/17/2020    Discharge Needs Assessment     Row Name 09/18/20 1605       Living Environment    Lives With  alone    Current Living Arrangements  home/apartment/condo    Family Caregiver if Needed  child(teresa), adult    Quality of Family Relationships  helpful;involved;supportive    Able to Return to Prior Arrangements  yes       Resource/Environmental Concerns    Transportation Concerns  car, none       Transition Planning    Patient/Family Anticipates Transition to  inpatient rehabilitation facility    Patient/Family Anticipated Services at Transition  skilled nursing    Transportation Anticipated  family or friend will provide       Discharge Needs Assessment    Readmission Within the Last 30 Days  no previous admission in last 30 days    Current Outpatient/Agency/Support Group  skilled nursing facility    Equipment Currently Used at Home  walker, rolling;commode;rollator;bath bench Transport Chair    Equipment Needed After Discharge  -- Defer to facility    Outpatient/Agency/Support Group Needs  skilled nursing facility    Discharge Facility/Level of Care Needs  nursing facility, skilled    Provided Post Acute Provider List?  Yes    Post Acute Provider List  Nursing Home    Provided Post Acute Provider Quality & Resource List?  Yes    Post Acute Provider Quality and Resource List  Nursing Home    Delivered To  Patient;Support Person    Method of Delivery  In person    Patient's Choice of Community Agency(s)  SNF's        Discharge Plan     Row Name 09/18/20 1608       Plan    Plan  SNF    Patient/Family in Agreement with Plan  yes    Plan Comments  Met with Ms. Reed and her daughter, Padilla Thorne, at the bedside for discharge planning.  Ms. Reed lives alone in [facility] [place].  She was independent prior to admission and has family assistance as needed.  She is going to the OR  today.  Ms. Reed would likely benefit from rehab after discharge from [facility].  Discussed rehab with the patient and Padilla Mcintyre and they requested referrals to [facility] and [facility].  Ms. Reed will need PT and OT to evaluate to be considered for rehab admission.    CM will follow up.    Final Discharge Disposition Code  03 - skilled nursing facility (SNF)        Continued Care and Services - Admitted Since 9/17/2020     Destination     Service Provider Request Status Selected Services Address Phone Fax    [facility]  Pending - No Request Sent N/A [address] [facility] [address] [phone]    [facility]  Pending - No Request Sent N/A [address] [facility] [id number] [phone]    [facility]  Pending - No Request Sent N/A [address] [facility] [id number] [phone]    [facility]  Pending - No Request Sent N/A [address] [facility] [address] [phone]              Expected Discharge Date and Time     Expected Discharge Date Expected Discharge Time    Sep 23, 2020         Demographic Summary     Row Name 09/18/20 1601       General Information    Admission Type  inpatient    Arrived From  home    Reason for Consult  discharge planning    General Information Comments  PCP:  Sandra Adkins       Contact Information    Permission Granted to Share Info With      Contact Information Comments  Daughter:  Padilla Thorne, ph [phone]        Functional Status     Row Name 09/18/20 1603       Functional Status    Usual Activity Tolerance  moderate    Current Activity Tolerance  -- Following for post op PT/OT eval       Functional Status, IADL    Medications  independent    Meal Preparation  independent    Housekeeping  independent    Laundry  independent    Shopping  independent        Psychosocial    No documentation.        Abuse/Neglect    No documentation.       Legal    No documentation.       Substance Abuse    No documentation.       Patient Forms    No documentation.           Nikki Ballesteros RN

## 2020-09-18 NOTE — H&P
"    [facility] Medicine Services  HISTORY AND PHYSICAL    Patient Name: Darline Reed  : 1926  MRN: [id number]  Primary Care Physician: Sandra Adkins MD  Date of admission: 2020      Subjective   Subjective     Chief Complaint:  Fall, hip pain     HPI:  Darline Reed is a 89 y.o. female w/ a hx of HTN, HLD, COPD, hypothyroidism, GERD and dementia who presented to the ED w/ c/o a fall and hip pain.   Per pt's daughter, pt lives alone and is independent w/ ADLs. Pt ambulates w/ a cane PRN. Pt's family does check in on her frequently throughout the day as pt w/ \"mild dementia\".   Today, pt was taken to the beauty salon by her son and was back home around 4pm. Around 5pm pt's daughter received a call that the pt had fallen and called her [employer] for assistance. Pt denies LOC. Pt c/o left hip pain after the fall. Reports that she was fixing a dinner and tripped landing on her left side.   Pt denies recent illness. No known sick contacts. Pt denies fever/chills, chest pain, dyspnea, cough, N/V/D, abdominal pain, dysuria, edema, syncope, confusion.  Pt evaluated in the ED. Scans c/w a left femoral neck fracture. Pt admitted to the hospital medicine service for further evaluation.       Review of Systems   Constitutional: Negative.  Negative for chills and fatigue.   HENT: Negative.  Negative for congestion, rhinorrhea, sinus pressure, sinus pain, sneezing, sore throat and trouble swallowing.    Eyes: Negative.  Negative for visual disturbance.   Respiratory: Negative.  Negative for cough and shortness of breath.    Cardiovascular: Negative.  Negative for chest pain and leg swelling.   Gastrointestinal: Positive for nausea and vomiting. Negative for abdominal distention, abdominal pain, blood in stool, constipation and diarrhea.        N/V after pain medication in ED.    Endocrine: Negative.    Genitourinary: Negative.  Negative for decreased urine volume, difficulty urinating, " dysuria, flank pain, frequency, hematuria, pelvic pain and urgency.   Musculoskeletal: Negative for back pain, gait problem, joint swelling, neck pain and neck stiffness.        Mechanical fall w/ let hip pain.    Skin: Negative.  Negative for wound.   Neurological: Negative.  Negative for dizziness, tremors, seizures, syncope, facial asymmetry, speech difficulty, weakness, light-headedness, numbness and headaches.   Hematological: Negative.  Does not bruise/bleed easily.   Psychiatric/Behavioral: Negative.  Negative for confusion.   All other systems reviewed and are negative.     All other systems reviewed and are negative.     Personal History     Past Medical History:   Diagnosis Date   • Abnormal PFTs 2010    Restriction and obstruction on PFT/s   • Arthritis    • Asthma    • Cachexia (CMS/[facility]) 3/19/2019   • COPD (chronic obstructive pulmonary disease) (CMS/[facility])    • Dementia (CMS/[facility])    • Diverticulitis    • Diverticulosis of colon without hemorrhage 3/19/2019   • Dyspnea    • Dyspnea on exertion 3/19/2019   • Falls frequently 3/19/2019   • GERD (gastroesophageal reflux disease)    • Hoarseness 3/19/2019   • Hypercholesteremia    • Hypertension 6/22/2016   • Hypothyroid    • Intractable acute post-traumatic headache 3/19/2019   • Intractable pain 6/19/2016   • Migraine 3/19/2019   • Osteoporosis    • Positive PPD    • Right knee meniscal tear 2004   • Right knee sprain     conservative   • Scoliosis    • Weight loss 6/19/2016       Past Surgical History:   Procedure Laterality Date   • BLEPHAROPLASTY     • CATARACT EXTRACTION  1999   • COSMETIC SURGERY     • TONSILLECTOMY  1947   • TONSILLECTOMY AND ADENOIDECTOMY         Family History: family history includes Arthritis in her mother; Cardiomyopathy in her son; Coronary artery disease in her mother; Diabetes in her mother; Hyperlipidemia in her mother. Otherwise pertinent FHx was reviewed and unremarkable.     Social History:  reports that she has never  smoked. She has never used smokeless tobacco. She reports that she does not drink alcohol or use drugs.  Social History     Social History Narrative   • Not on file       Medications:  Available home medication information reviewed.  (Not in a hospital admission)      Allergies   Allergen Reactions   • Codeine    • Penicillins        Objective   Objective     Vital Signs:   Temp:  [97.8 °F (36.6 °C)] 97.8 °F (36.6 °C)  Heart Rate:  [70-83] 71  Resp:  [18] 18  BP: (166-202)/() 166/80        Physical Exam  Vitals signs reviewed.   Constitutional:       General: She is not in acute distress.     Appearance: Normal appearance. She is not diaphoretic.   HENT:      Head: Normocephalic and atraumatic.      Nose: Nose normal.      Mouth/Throat:      Mouth: Mucous membranes are moist.   Eyes:      Extraocular Movements: Extraocular movements intact.      Pupils: Pupils are equal, round, and reactive to light.   Neck:      Musculoskeletal: Normal range of motion and neck supple.   Cardiovascular:      Rate and Rhythm: Normal rate and regular rhythm.      Pulses: Normal pulses.      Heart sounds: Normal heart sounds.   Pulmonary:      Effort: Pulmonary effort is normal. No respiratory distress.      Breath sounds: Normal breath sounds.   Abdominal:      General: Abdomen is flat. Bowel sounds are normal. There is no distension.      Palpations: Abdomen is soft.      Tenderness: There is no abdominal tenderness.   Musculoskeletal:      Right lower leg: No edema.      Left lower leg: No edema.      Comments: LLE externally rotated; shortened. ROM otherwise WNL.    Skin:     General: Skin is warm and dry.      Capillary Refill: Capillary refill takes more than 3 seconds.      Coloration: Skin is not pale.   Neurological:      General: No focal deficit present.      Mental Status: She is alert and oriented to person, place, and time. Mental status is at baseline.      Cranial Nerves: No cranial nerve deficit.   Psychiatric:          Mood and Affect: Mood normal.         Behavior: Behavior normal.         Thought Content: Thought content normal.         Judgment: Judgment normal.          Results Reviewed:  I have personally reviewed current lab and radiology data.    Results from last 7 days   Lab Units 09/17/20 1950   WBC 10*3/mm3 12.33*   HEMOGLOBIN g/dL 12.4   HEMATOCRIT % 37.8   PLATELETS 10*3/mm3 218     Results from last 7 days   Lab Units 09/17/20 1950   SODIUM mmol/L 133*   POTASSIUM mmol/L 3.6   CHLORIDE mmol/L 97*   CO2 mmol/L 26.0   BUN mg/dL 16   CREATININE mg/dL 0.71   GLUCOSE mg/dL 113*   CALCIUM mg/dL 9.0     Estimated Creatinine Clearance: 31.4 mL/min (by C-G formula based on SCr of 0.71 mg/dL).  Brief Urine Lab Results     None        Imaging Results (Last 24 Hours)     Procedure Component Value Units Date/Time    XR Hip With or Without Pelvis 2 - 3 View Left [[id number]] Collected: 09/17/20 1910     Updated: 09/17/20 1913    Narrative:      CR Hip Uni Comp Min 2 Vws LT    INDICATION:   Tripped and fell at home today. Left hip pain    COMPARISON:   None available.    FINDINGS:  AP and frog-leg lateral view(s) of the left hip.  Displaced left femoral neck fracture with one half femoral neck width lateral and proximal migration of the distal fracture fragment. No underlying pathologic lesion. Degenerative changes lower lumbar  spine. Soft tissues unremarkable. No significant arthropathy of the hip.        Impression:      Displaced left femoral neck fracture.    Signer Name: HARJIT Mays MD   Signed: 9/17/2020 7:10 PM   Workstation Name: [email or web address]    [facility]             Assessment/Plan   Assessment & Plan     Active Hospital Problems    Diagnosis POA   • **Closed transcervical fracture of left femur (CMS/HCC) [S72.032A] Yes   • Leukocytosis [D72.829] Yes   • Fall [W19.XXXA] Yes   • Dementia (CMS/HCC) [F03.90] Yes   • At high risk for falls [Z91.81] Not Applicable     7/31/2020 Sandra LAKHANI  MD Jed    Multifactorial fall risk.  We reviewed fall risk with medications.  We will decrease gabapentin to 1 capsule every evening.  She is tolerating her current regimen and is noting benefit with her medications.     • Benign essential hypertension [I10] Yes     7/31/2020 Sandra Adkins MD    Continue lifestyle control of blood pressure.   Continue low salt diet.    Check the blood pressure occasionally to make sure it is still under good control.     • COPD (chronic obstructive pulmonary disease) with chronic bronchitis (CMS/HCC) [J44.9] Yes     10/11/2019 Sandra Adkins MD    Continue bevespi inhaler daily.     • Hypothyroidism (acquired) [E03.9] Yes     7/31/2020 Sandra Adkins MD    Continue levothyroxine daily.     • Hypercholesteremia [E78.00] Yes     7/31/2020 Sandra Adkins MD    Continue low-fat low sugar diet.         Assessment & Plan    **Displaced left femoral neck fracture  **Mechanical fall  -XRAY reviewed  -Dr. Hill w/ Ortho @ bedside this evening to discuss POC w/ pt and pt's daughter  -OR tomorrow; Dr. Hill consult placed  -NPO after MN  -light IVF overnight; NS @ 50ml/hour x 1 liter  -bladder scan q 4 hrs; external catheter placed  -PRN dilaudid; PRN antiemetics; Narcan PRN order placed  -continue routine bowel regimen meds; colace BID   -NV checks q 4 hrs  -pre-op EKG, CXR, UA, coags, mag, tsh, cbc, bmp and type/screen ordered/pending  -case mgt consult in am   -pre-procedure ABBOTT COVID screen ordered; pending     **Leukocytosis  -mild @ 12.33; likely reactive  -afebrile  -baseline CXR and UA pending   -cbc w/ am labs; trend     **HTN  **HLD  -stable; not on routine antihypertensives    **Dementia  -continue routine Aricept, Zoloft  -fall precautions  -case mgt consult    **Hypothyroidism  -tsh w/ am labs  -continue routine synthroid     **GERD  -continue routine Prilosec (sub Protonix)        DVT prophylaxis:  Mechanical       CODE STATUS:    Code Status and Medical  Interventions:   Ordered at: 09/17/20 2054     Limited Support to NOT Include:    Intubation     Level Of Support Discussed With:    Patient    Health Care Surrogate     Code Status:    CPR     Medical Interventions (Level of Support Prior to Arrest):    Limited     Comments:    no intubation       Admission Status:  I believe this patient meets OBSERVATION status, however if further evaluation or treatment plans warrant, status may change.  Based upon current information, I predict patient's care encounter to be less than or equal to 2 midnights.    Electronically signed by YOJANA Donovan, 09/17/20, 9:16 PM EDT.    Admission Attestation         I have seen and examined the patient, performing an independent face-to-face diagnostic evaluation with plan of care reviewed and developed with the advanced practice clinician (APC).        Brief Summary Statement:   Darline Reed is a 89 y.o. female past medical history of hypertension, COPD, hypothyroidism, dementia.  Patient presents to the ED with left hip pain and difficulty walking after sustaining a fall this afternoon.  On arrival here, initial work-up shows WBC 12.3, sodium 133, left hip x-ray did show displaced left femoral neck fracture.  [facility]th[facility] consulted plan to take her to the OR tomorrow.  Hospitalist was then asked to admit.  At the time of my evaluation, patient did endorse some pain but was otherwise comfortable, daughter was at bedside, she mentions that other than some mild memory issues, patient is pretty independent.  She states that patient did trip when on her leg.     Remainder of detailed HPI is as noted by APC and has been reviewed and/or edited by me for completeness.     Attending Physical Exam:  Constitutional: Elderly lady, in no acute distress, sleepy  Eyes: PERRLA, sclerae anicteric, no conjunctival injection  HENT: NCAT, mucous membranes dry  Neck: Supple, no thyromegaly, no lymphadenopathy, trachea midline  Respiratory: Clear to  auscultation bilaterally, nonlabored respirations   Cardiovascular: RRR, no murmurs, rubs, or gallops, palpable pedal pulses bilaterally  Gastrointestinal: Positive bowel sounds, soft, nontender, nondistended  Musculoskeletal: No bilateral ankle edema, no clubbing or cyanosis to extremities  Psychiatric: Appropriate affect, cooperative  Neurologic: Oriented x 1, left lower extremity exam limited with pain, cranial Nerves grossly intact to confrontation, speech clear  Skin: No rashes     Brief Assessment/Plan :  Displaced left femoral neck fracture  -Secondary to mechanical fall, Ortho has evaluated, plan for tomorrow 9/18, keep n.p.o. after midnight, continue pain control  -We will need PT/OT and possibly rehab    Hypertension-patient with no underlying history of hypertension, suspect this secondary to pain, continue to monitor for now, PRN in place    See detailed assessment and plan developed with APC which I have reviewed and/or edited for completeness.             Electronically signed by Jennifer Sawant MD, 09/17/20, 9:25 PM EDT.

## 2020-09-18 NOTE — PROGRESS NOTES
[facility] Medicine Services  PROGRESS NOTE    Patient Name: Darline Reed  : 1926  MRN: [id number]    Date of Admission: 2020  Primary Care Physician: Sandra Adkins MD    Subjective   Subjective     CC:  Fall w femur fx at home.     HPI:  Some pain in L thigh; about to depart for Preop.  No other complaints.  Anxious    Review of Systems  No fever   no dysuria   no chest pain or dyspnea.     Objective   Objective     Vital Signs:   Temp:  [97.7 °F (36.5 °C)-97.8 °F (36.6 °C)] 97.8 °F (36.6 °C)  Heart Rate:  [70-83] 80  Resp:  [16-18] 16  BP: (144-202)/() 157/66        Physical Exam:  Gen:  WD/WN thin woman in NAD, chatty, anxious.   Neuro: alert and oriented, clear speech, follows commands, grossly nonfocal  HEENT:  NC/AT PERRL, OP benign  Neck:  Supple, no LAD  Heart RRR no murmur, rub, or gallop  Lungs CTA nonlabored  Abd:  Soft, nontender, no rebound or guarding, pos BS  Extrem:  No c/c/e, On-Q cath in thigh.        Results Reviewed:  Results from last 7 days   Lab Units 20  1950   WBC 10*3/mm3 12.33*   HEMOGLOBIN g/dL 12.4   HEMATOCRIT % 37.8   PLATELETS 10*3/mm3 218     Results from last 7 days   Lab Units 20  1950   SODIUM mmol/L 133*   POTASSIUM mmol/L 3.6   CHLORIDE mmol/L 97*   CO2 mmol/L 26.0   BUN mg/dL 16   CREATININE mg/dL 0.71   GLUCOSE mg/dL 113*   CALCIUM mg/dL 9.0     Estimated Creatinine Clearance: 31.4 mL/min (by C-G formula based on SCr of 0.71 mg/dL).    Microbiology Results Abnormal     Procedure Component Value - Date/Time    COVID PRE-OP / PRE-PROCEDURE SCREENING ORDER (NO ISOLATION) - Swab, Nasal Cavity [[id number]]  (Normal) Collected: 20    Lab Status: Final result Specimen: Swab from Nasal Cavity Updated: 20    Narrative:      The following orders were created for panel order COVID PRE-OP / PRE-PROCEDURE SCREENING ORDER (NO ISOLATION) - Swab, Nasal Cavity.  Procedure                                Abnormality         Status                     ---------                               -----------         ------                     COVID-19, ABBOTT IN-HOUS...[[id number]]  Normal              Final result                 Please view results for these tests on the individual orders.    COVID-19, ABBOTT IN-HOUSE,NP Swab (NO TRANSPORT MEDIA) 2 HR TAT - Swab, Nasal Cavity [[id number]]  (Normal) Collected: 09/18/20 0122    Lab Status: Final result Specimen: Swab from Nasal Cavity Updated: 09/18/20 0239     COVID19 Not Detected    Narrative:      Fact sheet for providers: https://www.fda.gov/media/924491/download     Fact sheet for patients: https://www.fda.gov/media/422202/download          Imaging Results (Last 24 Hours)     Procedure Component Value Units Date/Time    XR Chest 1 View [[id number]] Collected: 09/18/20 0031     Updated: 09/18/20 0033    Narrative:      CR Chest 1 Vw    INDICATION:   Emphysema. Presurgical evaluation for femoral fracture.     COMPARISON:    4/20/2018    FINDINGS:  Single portable AP view(s) of the chest.        The heart and mediastinal contours are normal. The lungs are clear. Emphysematous changes are again seen. No pneumothorax or pleural effusion.       Impression:      No acute cardiopulmonary findings.    Signer Name: Ricco Botello MD   Signed: 9/18/2020 12:31 AM   Workstation Name: [id number]    [facility]    XR Hip With or Without Pelvis 2 - 3 View Left [[id number]] Collected: 09/17/20 1910     Updated: 09/17/20 1913    Narrative:      CR Hip Uni Comp Min 2 Vws LT    INDICATION:   Tripped and fell at home today. Left hip pain    COMPARISON:   None available.    FINDINGS:  AP and frog-leg lateral view(s) of the left hip.  Displaced left femoral neck fracture with one half femoral neck width lateral and proximal migration of the distal fracture fragment. No underlying pathologic lesion. Degenerative changes lower lumbar  spine. Soft tissues unremarkable. No  significant arthropathy of the hip.        Impression:      Displaced left femoral neck fracture.    Signer Name: HARJIT Mays MD   Signed: 9/17/2020 7:10 PM   Workstation Name: [facility]    [facility] [place]               I have reviewed the medications:  Scheduled Meds:docusate sodium, 200 mg, Oral, BID  donepezil, 5 mg, Oral, Nightly  levothyroxine, 100 mcg, Oral, Q AM  oxybutynin XL, 10 mg, Oral, Daily  pantoprazole, 40 mg, Oral, Daily  sertraline, 25 mg, Oral, Daily  sodium chloride, 10 mL, Intravenous, Q12H      Continuous Infusions:sodium chloride, 50 mL/hr, Last Rate: 50 mL/hr (09/18/20 0042)      PRN Meds:.•  acetaminophen **OR** acetaminophen **OR** acetaminophen  •  HYDROmorphone  •  naloxone  •  ondansetron **OR** ondansetron  •  [COMPLETED] Insert peripheral IV **AND** sodium chloride  •  sodium chloride    Assessment/Plan   Assessment & Plan     Active Hospital Problems    Diagnosis  POA   • **Closed transcervical fracture of left femur (CMS/HCC) [S72.032A]  Yes   • Leukocytosis [D72.829]  Yes   • Fall [W19.XXXA]  Yes   • Dementia (CMS/[facility]) [F03.90]  Yes   • At high risk for falls [Z91.81]  Not Applicable   • Benign essential hypertension [I10]  Yes   • COPD (chronic obstructive pulmonary disease) with chronic bronchitis (CMS/HCC) [J44.9]  Yes   • Hypothyroidism (acquired) [E03.9]  Yes   • Hypercholesteremia [E78.00]  Yes      Resolved Hospital Problems   No resolved problems to display.        Brief Hospital Course to date:  Darline Reed is a 89 y.o. female pretty independent at home, with  past medical history of hypertension, COPD, hypothyroidism, dementia. Fell at home, unwitnessed, left hip x-ray did show displaced left femoral neck fracture.      Displaced left femoral neck fracture  Mechanical fall   -Dr. Hill will take to OR today  - pain control  - EKG reviewed, labs reviewed  - -pre-procedure ABBOTT COVID screen neg      UA mildly abnl - doubt this is a UTI but will  treat if fever, progressive elev WBC.    Mild leukocytosis, follow     HTN  HLD  -stable; not on routine antihypertensives     Dementia, mild, lives at home  -continue routine Aricept, Zoloft  -fall precautions  -case mgt consult     Hypothyroidism  -tsh w/ am labs  -continue routine synthroid      GERD -continue routine Prilosec (sub Protonix)         DVT prophylaxis:  Mechanical        Disposition: I expect the patient to be discharged tbd, expect SNF     CODE STATUS:   Code Status and Medical Interventions:   Ordered at: 09/17/20 2054     Limited Support to NOT Include:    Intubation     Level Of Support Discussed With:    Patient    Health Care Surrogate     Code Status:    CPR     Medical Interventions (Level of Support Prior to Arrest):    Limited     Comments:    no intubation

## 2020-09-18 NOTE — ANESTHESIA PREPROCEDURE EVALUATION
Anesthesia Evaluation     Patient summary reviewed and Nursing notes reviewed   no history of anesthetic complications:  NPO Solid Status: > 8 hours  NPO Liquid Status: > 8 hours           Airway   Mallampati: II  TM distance: >3 FB  Neck ROM: full  No difficulty expected  Dental      Pulmonary - normal exam   (+) COPD, asthma,shortness of breath,   Cardiovascular - normal exam    (+) hypertension, hyperlipidemia,       Neuro/Psych  (+) headaches, numbness, psychiatric history, dementia,     GI/Hepatic/Renal/Endo    (+)  GERD,      Musculoskeletal     Abdominal    Substance History      OB/GYN          Other   arthritis,                      Anesthesia Plan    ASA 3     general     intravenous induction     Anesthetic plan, all risks, benefits, and alternatives have been provided, discussed and informed consent has been obtained with: patient.    Plan discussed with CRNA.

## 2020-09-18 NOTE — ANESTHESIA PROCEDURE NOTES
Airway  Urgency: elective    Date/Time: 9/18/2020 1:31 PM  Airway not difficult    General Information and Staff    Patient location during procedure: OR  CRNA: Darnell Sutton CRNA    Indications and Patient Condition  Indications for airway management: airway protection    Preoxygenated: yes  MILS not maintained throughout  Mask difficulty assessment: 2 - vent by mask + OA or adjuvant +/- NMBA    Final Airway Details  Final airway type: endotracheal airway      Successful airway: ETT  Cuffed: yes   Successful intubation technique: direct laryngoscopy  Facilitating devices/methods: Bougie  Endotracheal tube insertion site: oral  Blade: Watson  Blade size: 3  ETT size (mm): 7.0  Cormack-Lehane Classification: grade IIa - partial view of glottis  Placement verified by: chest auscultation and capnometry   Cuff volume (mL): 6  Measured from: lips  ETT/EBT  to lips (cm): 21  Number of attempts at approach: 2  Assessment: lips, teeth, and gum same as pre-op and atraumatic intubation    Additional Comments  Negative epigastric sounds, Breath sound equal bilaterally with symmetric chest rise and fall

## 2020-09-18 NOTE — ED PROVIDER NOTES
Subjective   Pt presents with left hip pain.  She tripped and fell at home.  She denies injury to head, neck, back, arms or right leg.  She has no numbness.  Pain worse with any movement.  Not on blood thinners.  History of osteoporosis.      History provided by:  Patient and relative      Review of Systems   Constitutional: Negative for fever.   Respiratory: Negative for shortness of breath.    Cardiovascular: Negative for chest pain.   Musculoskeletal: Negative for back pain and neck pain.   Neurological: Negative for dizziness, weakness and headaches.   All other systems reviewed and are negative.      Past Medical History:   Diagnosis Date   • Abnormal PFTs 2010    Restriction and obstruction on PFT/s   • Arthritis    • Asthma    • Cachexia (CMS/HCC) 3/19/2019   • COPD (chronic obstructive pulmonary disease) (CMS/HCC)    • Dementia (CMS/HCC)    • Diverticulitis    • Diverticulosis of colon without hemorrhage 3/19/2019   • Dyspnea    • Dyspnea on exertion 3/19/2019   • Falls frequently 3/19/2019   • GERD (gastroesophageal reflux disease)    • Hoarseness 3/19/2019   • Hypercholesteremia    • Hypertension 6/22/2016   • Hypothyroid    • Intractable acute post-traumatic headache 3/19/2019   • Intractable pain 6/19/2016   • Migraine 3/19/2019   • Osteoporosis    • Positive PPD    • Right knee meniscal tear 2004   • Right knee sprain     conservative   • Scoliosis    • Weight loss 6/19/2016       Allergies   Allergen Reactions   • Codeine    • Penicillins        Past Surgical History:   Procedure Laterality Date   • BLEPHAROPLASTY     • CATARACT EXTRACTION  1999   • COSMETIC SURGERY     • TONSILLECTOMY  1947   • TONSILLECTOMY AND ADENOIDECTOMY         Family History   Problem Relation Age of Onset   • Coronary artery disease Mother    • Arthritis Mother    • Diabetes Mother    • Hyperlipidemia Mother    • Cardiomyopathy Son        Social History     Socioeconomic History   • Marital status:      Spouse name: Not  on file   • Number of children: Not on file   • Years of education: Not on file   • Highest education level: Not on file   Tobacco Use   • Smoking status: Never Smoker   • Smokeless tobacco: Never Used   Substance and Sexual Activity   • Alcohol use: No   • Drug use: No   • Sexual activity: Never     Comment:            Objective   Physical Exam  Vitals signs and nursing note reviewed.   Constitutional:       General: She is not in acute distress.     Appearance: Normal appearance. She is not ill-appearing.   HENT:      Head: Normocephalic and atraumatic.      Mouth/Throat:      Mouth: Mucous membranes are moist.   Eyes:      General: No scleral icterus.        Right eye: No discharge.         Left eye: No discharge.      Conjunctiva/sclera: Conjunctivae normal.   Neck:      Musculoskeletal: Normal range of motion and neck supple.   Cardiovascular:      Rate and Rhythm: Normal rate and regular rhythm.      Heart sounds: No murmur.   Pulmonary:      Effort: Pulmonary effort is normal. No respiratory distress.      Breath sounds: Normal breath sounds. No wheezing.   Abdominal:      General: Bowel sounds are normal. There is no distension.      Palpations: Abdomen is soft.      Tenderness: There is no abdominal tenderness. There is no guarding or rebound.   Musculoskeletal: Normal range of motion.         General: No swelling.      Comments: Pelvis stable.  Left hip tenderness.  LLE shortened.  ROM not tested.  Sensation intact in foot, moves toes. Except as documented there is no tenderness to gross palpation of the arms or legs, and intact painless ROM to the shoulders, elbows, wrists, hips, knees and ankles.     Skin:     General: Skin is warm and dry.      Findings: No rash.   Neurological:      General: No focal deficit present.      Mental Status: She is alert. Mental status is at baseline.   Psychiatric:         Mood and Affect: Mood normal.         Behavior: Behavior normal.         Thought Content:  Thought content normal.         Procedures           ED Course         XR positive.  Discussed with Dr Hill who will see her tonight and operate tomorrow.  Pain meds ordered.  Patient stable on serial rechecks.  Discussed exam findings, test results so far and concerns in detail at the bedside.  Discussed need for admission for further evaluation and treatment.                                    MDM  Number of Diagnoses or Management Options  Closed transcervical fracture of left femur, initial encounter (CMS/[facility]):      Amount and/or Complexity of Data Reviewed  Clinical lab tests: reviewed and ordered  Tests in the radiology section of CPT®: reviewed and ordered  Decide to obtain previous medical records or to obtain history from someone other than the patient: yes  Obtain history from someone other than the patient: yes  Discuss the patient with other providers: yes  Independent visualization of images, tracings, or specimens: yes        Final diagnoses:   Closed transcervical fracture of left femur, initial encounter (CMS/[facility])            Marlon Lucas MD  09/17/20 1086

## 2020-09-18 NOTE — ANESTHESIA POSTPROCEDURE EVALUATION
Patient: Darline Reed    Procedure Summary     Date: 09/18/20 [facility]    Anesthesia Start: 1314 Anesthesia Stop:     Procedure: HIP HEMIARTHROPLASTY LEFT (Left Hip) Diagnosis:       Left displaced femoral neck fracture (CMS/HCC)      (left hip femoral neck fracture)    Surgeon: Clarence Hill MD Provider: Volodymyr Gunter MD    Anesthesia Type: general ASA Status: 3          Anesthesia Type: general    Vitals  Vitals Value Taken Time   BP     Temp     Pulse     Resp     SpO2 100 % 09/18/20 1454   Vitals shown include unvalidated device data.        Post Anesthesia Care and Evaluation    Patient location during evaluation: PACU  Patient participation: complete - patient participated  Level of consciousness: awake  Pain score: 0  Pain management: adequate  Airway patency: patent  Anesthetic complications: No anesthetic complications  PONV Status: none  Cardiovascular status: acceptable and stable  Respiratory status: nasal cannula, unassisted, acceptable and spontaneous ventilation  Hydration status: acceptable

## 2020-09-18 NOTE — ANESTHESIA PROCEDURE NOTES
Fascia Iliaca Catheter      Patient reassessed immediately prior to procedure    Patient location during procedure: pre-op  Reason for block: procedure for pain and at surgeon's request  Performed by  CRNA: Elmer Roberts, CRNA  Assisted by: Frances Willams RN  Preanesthetic Checklist  Completed: patient identified, site marked, surgical consent, pre-op evaluation, timeout performed, IV checked, risks and benefits discussed and monitors and equipment checked  Prep:  Pt Position: supine  Sterile barriers:cap, gloves and mask  Prep: ChloraPrep  Patient monitoring: blood pressure monitoring, continuous pulse oximetry and EKG  Procedure  Sedation:no  Performed under: local infiltration  Guidance:ultrasound guided  Images:still images obtained, printed/placed on chart    Laterality:left  Block Type:fascia iliaca catheter  Injection Technique:catheter  Needle Type:echogenic  Needle Gauge:18 G  Resistance on Injection: none  Catheter Size:20 G (20g)  Cath Depth at skin: 14 cm    Medications Used: ropivacaine (NAROPIN) 0.5 % injection, 17.5 mL  Med admintered at 9/18/2020 9:45 AM      Medications  Preservative Free Saline:18ml    Post Assessment  Injection Assessment: negative aspiration for heme, no paresthesia on injection and incremental injection  Patient Tolerance:comfortable throughout block  Complications:no  Additional Notes  Procedure:                 Pt placed in supine position.   The insertion site was prepped in sterile fashion with Chlorapreop and clear plastic drapes.  Analgesia was provided by skin infiltration at insertion site with Lidocaine 1% 3mls.  A B-Melendrez 18 g , 4 inch echogenic Touhy needle was advance In-plane under ultrasound guidance. The   Anterior superior Iliac crest was initially visualized and the probe was directed slightly medially and slightly towards the umbilicus.  The course of the needle was tracked over the sartorius muscle through the fascia Iliacus and into the anterior  portion of the Iliacus muscle.  Major vessels where identified and avoided as where structures of the peritoneal cavity.  LA injection was made incrementally in 1-5ml amounts spread was visualized superiorly below fascia iliacus.  Injection was completed with negative aspiration of blood and negative intravascular injection.  Injection pressures where normal or minimal resistance.  A 20 g B-Melendrez wire styleted catheter was then advance thru the needle and very easily placed in a superior or cephalad direction.  The catheter was secured at insertion site with skin afix , mastisol, steristreps.  A CHG tegaderm dressing was placed over the insertion site and the nerve catheter labeled and capped.  Thank You.

## 2020-09-18 NOTE — PLAN OF CARE
Patient having a lot of pain in left leg and hip. Went to surgery this afternoon and returned shortly after IV pain medicine. Patient disoriented x4. Drowsy. Due to void.

## 2020-09-18 NOTE — CONSULTS
Patient: Darline Reed    Date of Admission: 9/17/2020  6:02 PM    YOB: 1926    Medical Record Number: [id number]    Attending Physician: Jennifer Sawant MD     Primary care physician: Dr. Sandra Adkins    Consulting Physician: Clarence Hill MD      Chief Complaints: Closed transcervical fracture of left femur, initial encounter (CMS/[facility]) [S72.032A]  Closed transcervical fracture of left femur, initial encounter (CMS/HCC) [S72.032A]  Left hip pain    History of Present Illness: Darline is a very pleasant 89-year-old female who presents with left hip pain and inability to ambulate after a fall at home this evening.  She lives at home alone but has a daughter and son that live close by.  She is a home ambulator with minimal cane assist.  Her daughter notes that she has very mild dementia but is relatively independent.  She tripped and fell getting up from her dining room table this evening and was able to get immediate help with her [employer].  Upon my evaluation of the patient in the ER she has an isolated complaint of left hip pain.       Allergies   Allergen Reactions   • Codeine    • Penicillins         Home Medications:  (Not in a hospital admission)      Current Medications:  Scheduled Meds:   Continuous Infusions:   PRN Meds:.•  [COMPLETED] Insert peripheral IV **AND** sodium chloride    Past Medical History:   Diagnosis Date   • Abnormal PFTs 2010    Restriction and obstruction on PFT/s   • Arthritis    • Asthma    • Cachexia (CMS/[facility]) 3/19/2019   • COPD (chronic obstructive pulmonary disease) (CMS/[facility])    • Diverticulitis    • Diverticulosis of colon without hemorrhage 3/19/2019   • Dyspnea    • Dyspnea on exertion 3/19/2019   • Falls frequently 3/19/2019   • GERD (gastroesophageal reflux disease)    • Hoarseness 3/19/2019   • Hypercholesteremia    • Hypertension 6/22/2016   • Hypothyroid    • Intractable acute post-traumatic headache 3/19/2019   • Intractable pain 6/19/2016   •  Migraine 3/19/2019   • Osteoporosis    • Positive PPD    • Right knee meniscal tear 2004   • Right knee sprain     conservative   • Scoliosis    • Weight loss 6/19/2016        Past Surgical History:   Procedure Laterality Date   • BLEPHAROPLASTY     • CATARACT EXTRACTION  1999   • COSMETIC SURGERY     • TONSILLECTOMY  1947   • TONSILLECTOMY AND ADENOIDECTOMY          Social History     Occupational History   • Not on file   Tobacco Use   • Smoking status: Never Smoker   • Smokeless tobacco: Never Used   Substance and Sexual Activity   • Alcohol use: No   • Drug use: No   • Sexual activity: Never     Comment:       Social History     Social History Narrative   • Not on file        Family History   Problem Relation Age of Onset   • Coronary artery disease Mother    • Arthritis Mother    • Diabetes Mother    • Hyperlipidemia Mother    • Cardiomyopathy Son          Review of Systems:     Musculoskeletal: Left hip pain and unable to ambulate.  Otherwise negative or noncontributory to the orthopedic exam.    Physical Exam: 89 y.o. female  General Appearance:    Alert, cooperative, in no acute distress                   Vitals:    09/17/20 1817 09/17/20 1856 09/17/20 1900 09/17/20 2000   BP:  (!) 202/88 (!) 191/94 166/80   Patient Position:       Pulse: 76 73 70 71   Resp:   18    Temp:       TempSrc:       SpO2: 94% 98% 99%  Comment: 3L O2 98%   Weight:       Height:            Extremities:  Left hip skin is intact  Left lower extremity shortened and externally rotated  Thigh and calf soft and nontender  Neurovascularly intact distally      Diagnostic Tests:    Admission on 09/17/2020   Component Date Value Ref Range Status   • Glucose 09/17/2020 113* 65 - 99 mg/dL Final   • BUN 09/17/2020 16  8 - 23 mg/dL Final   • Creatinine 09/17/2020 0.71  0.57 - 1.00 mg/dL Final   • Sodium 09/17/2020 133* 136 - 145 mmol/L Final   • Potassium 09/17/2020 3.6  3.5 - 5.2 mmol/L Final    Slight hemolysis detected by analyzer. Results  may be affected.   • Chloride 09/17/2020 97* 98 - 107 mmol/L Final   • CO2 09/17/2020 26.0  22.0 - 29.0 mmol/L Final   • Calcium 09/17/2020 9.0  8.2 - 9.6 mg/dL Final   • eGFR Non African Amer 09/17/2020 77  >60 mL/min/1.73 Final   • BUN/Creatinine Ratio 09/17/2020 22.5  7.0 - 25.0 Final   • Anion Gap 09/17/2020 10.0  5.0 - 15.0 mmol/L Final   • WBC 09/17/2020 12.33* 3.40 - 10.80 10*3/mm3 Final   • RBC 09/17/2020 4.04  3.77 - 5.28 10*6/mm3 Final   • Hemoglobin 09/17/2020 12.4  12.0 - 15.9 g/dL Final   • Hematocrit 09/17/2020 37.8  34.0 - 46.6 % Final   • MCV 09/17/2020 93.6  79.0 - 97.0 fL Final   • MCH 09/17/2020 30.7  26.6 - 33.0 pg Final   • MCHC 09/17/2020 32.8  31.5 - 35.7 g/dL Final   • RDW 09/17/2020 13.4  12.3 - 15.4 % Final   • RDW-SD 09/17/2020 46.1  37.0 - 54.0 fl Final   • MPV 09/17/2020 9.7  6.0 - 12.0 fL Final   • Platelets 09/17/2020 218  140 - 450 10*3/mm3 Final   • Neutrophil % 09/17/2020 79.8* 42.7 - 76.0 % Final   • Lymphocyte % 09/17/2020 8.0* 19.6 - 45.3 % Final   • Monocyte % 09/17/2020 9.2  5.0 - 12.0 % Final   • Eosinophil % 09/17/2020 1.8  0.3 - 6.2 % Final   • Basophil % 09/17/2020 0.6  0.0 - 1.5 % Final   • Immature Grans % 09/17/2020 0.6* 0.0 - 0.5 % Final   • Neutrophils, Absolute 09/17/2020 9.84* 1.70 - 7.00 10*3/mm3 Final   • Lymphocytes, Absolute 09/17/2020 0.99  0.70 - 3.10 10*3/mm3 Final   • Monocytes, Absolute 09/17/2020 1.14* 0.10 - 0.90 10*3/mm3 Final   • Eosinophils, Absolute 09/17/2020 0.22  0.00 - 0.40 10*3/mm3 Final   • Basophils, Absolute 09/17/2020 0.07  0.00 - 0.20 10*3/mm3 Final   • Immature Grans, Absolute 09/17/2020 0.07* 0.00 - 0.05 10*3/mm3 Final   • nRBC 09/17/2020 0.0  0.0 - 0.2 /100 WBC Final       Pelvis and left hip x-rays reveal displaced left hip femoral neck fracture    Assessment: 89-year-old female with displaced left hip femoral neck fracture    Patient Active Problem List   Diagnosis   • Weight loss   • Chronic bilateral thoracic back pain   • Chronic  right-sided low back pain with right-sided sciatica   • Senile osteoporosis   • Scoliosis (and kyphoscoliosis), idiopathic   • Memory loss   • Hypercholesteremia   • Urge incontinence of urine   • Benign essential hypertension   • Constipation by delayed colonic transit   • At high risk for falls   • Mild major depression, single episode (CMS/HCC)   • COPD (chronic obstructive pulmonary disease) with chronic bronchitis (CMS/HCC)   • Gastroesophageal reflux disease without esophagitis   • Hypothyroidism (acquired)   • Macular degeneration (senile) of retina   • Closed transcervical fracture of left femur (CMS/HCC)   • Leukocytosis   • Fall           Plan:  The patient voiced understanding of the risks, benefits, and alternative forms of treatment that were discussed and the patient consents to proceed with left hip hemiarthroplasty.   -I reviewed the x-ray findings with Darline and her daughter who is her power of  and was present at bedside in the ER.  She has a displaced left hip femoral neck fracture for which I recommend left hip hemiarthroplasty.  I discussed all the risks, benefits and alternatives to surgery and they agreed to proceed.  They understand risks to include but not limited to infection, pain, stiffness, functional decline, leg length inequality, dislocation, periprosthetic fracture, neurovascular injury and medical risks associated with surgery.  They understand the long rehab course involved.  They understand the significant morbidity and mortality risks associated with hip fractures.  -Plan to proceed with left hip hemiarthroplasty in the OR tomorrow pending availability.  -Admit to the hospitalist service for medical management  -N.p.o. after midnight.  Clindamycin for prophylactic antibiotics.  Bedrest until surgery with plan to mobilize with therapy postoperatively.  -Anesthesia for fascia iliaca catheter for perioperative pain control  - for rehab placement  -Thank you very  much for this consult, this patient was a pleasure to evaluate and treat.  -Please call with any questions or concerns      Discharge Plan: Rehab in 3 to 5 days      Date: 9/17/2020    Clarence Hill MD

## 2020-09-18 NOTE — BRIEF OP NOTE
HIP HEMIARTHROPLASTY  Progress Note    Darline S Derek  9/18/2020    Pre-op Diagnosis:   left hip femoral neck fracture       Post-Op Diagnosis Codes:     * Left displaced femoral neck fracture (CMS/[facility]) [S72.002A]    Procedure/CPT® Codes:  MN FEMORAL FX, OPEN TX [61779]      Procedure(s):  HIP HEMIARTHROPLASTY LEFT    Surgeon(s):  Clarence Hill MD     Assistant: MESFIN Godfrey    Anesthesia: General with fascia iliaca catheter    Staff:   Circulator: Ysabel Raphael RN; Rosaline Wall RN; Tara Luther RN  Scrub Person: Jorge Chavez  Vendor Representative: Geo More  Nursing Assistant: Renay Hunt  Assistant: Monique Payan PA  Assistant: Monique Payan PA      Estimated Blood Loss: 100ml    Urine Voided: * No values recorded between 9/18/2020  1:14 PM and 9/18/2020  2:38 PM *    Specimens:                None          Drains: * No LDAs found *    Findings: Left hip displaced femoral neck fracture    Complications: None    Implants: [employer] bipolar hemiarthroplasty with a size 12 press-fit femur, 45 bipolar head and standard taper    Assistant: Monique Payan PA  was responsible for performing the following activities: Retraction, implantation of components and closure and their skilled assistance was necessary for the success of this case.    Clarence Hill MD     Date: 9/18/2020  Time: 14:38 EDT

## 2020-09-18 NOTE — PROGRESS NOTES
"/70 (BP Location: Right arm, Patient Position: Lying)   Pulse 80   Temp 97.7 °F (36.5 °C) (Oral)   Resp 18   Ht 160 cm (63\")   Wt 46.3 kg (102 lb)   SpO2 99%   BMI 18.07 kg/m²     Lab Results (last 24 hours)     Procedure Component Value Units Date/Time    COVID PRE-OP / PRE-PROCEDURE SCREENING ORDER (NO ISOLATION) - Swab, Nasal Cavity [[id number]]  (Normal) Collected: 09/18/20 0122    Specimen: Swab from Nasal Cavity Updated: 09/18/20 0239    Narrative:      The following orders were created for panel order COVID PRE-OP / PRE-PROCEDURE SCREENING ORDER (NO ISOLATION) - Swab, Nasal Cavity.  Procedure                               Abnormality         Status                     ---------                               -----------         ------                     COVID-19, [facility]BBOTT IN-HOUS...[[id number]]  Normal              Final result                 Please view results for these tests on the individual orders.    COVID-19, ABBOTT IN-HOUSE,NP Swab (NO TRANSPORT MEDIA) 2 HR TAT - Swab, Nasal Cavity [[id number]]  (Normal) Collected: 09/18/20 0122    Specimen: Swab from Nasal Cavity Updated: 09/18/20 0239     COVID19 Not Detected    Narrative:      Fact sheet for providers: https://www.fda.gov/media/290321/download     Fact sheet for patients: https://www.fda.gov/media/225318/download    Urinalysis, Microscopic Only - Urine, Clean Catch [[id number]]  (Abnormal) Collected: 09/18/20 0137    Specimen: Urine, Clean Catch Updated: 09/18/20 0142     RBC, UA 7-12 /HPF      WBC, UA 6-12 /HPF      Bacteria, UA 1+ /HPF      Squamous Epithelial Cells, UA 0-2 /HPF      Hyaline Casts, UA 0-6 /LPF      Methodology Automated Microscopy    Urine Culture - Urine, Urine, Clean Catch [[id number]] Collected: 09/18/20 0137    Specimen: Urine, Clean Catch Updated: 09/18/20 0142    Urinalysis With Culture If Indicated - Urine, Clean Catch [[id number]]  (Abnormal) Collected: 09/18/20 0137    Specimen: Urine, Clean Catch Updated: 09/18/20 " 0142     Color, UA Yellow     Appearance, UA Cloudy     pH, UA 7.0     Specific Gravity, UA 1.011     Glucose, UA Negative     Ketones, UA Negative     Bilirubin, UA Negative     Blood, UA Negative     Protein, UA Trace     Leuk Esterase, UA Trace     Nitrite, UA Negative     Urobilinogen, UA 0.2 E.U./dL    Basic Metabolic Panel [[id number]]  (Abnormal) Collected: 09/17/20 1950    Specimen: Blood Updated: 09/17/20 2027     Glucose 113 mg/dL      BUN 16 mg/dL      Creatinine 0.71 mg/dL      Sodium 133 mmol/L      Potassium 3.6 mmol/L      Comment: Slight hemolysis detected by analyzer. Results may be affected.        Chloride 97 mmol/L      CO2 26.0 mmol/L      Calcium 9.0 mg/dL      eGFR Non African Amer 77 mL/min/1.73      BUN/Creatinine Ratio 22.5     Anion Gap 10.0 mmol/L     Narrative:      GFR Normal >60  Chronic Kidney Disease <60  Kidney Failure <15      CBC & Differential [[id number]]  (Abnormal) Collected: 09/17/20 1950    Specimen: Blood Updated: 09/17/20 2000    Narrative:      The following orders were created for panel order CBC & Differential.  Procedure                               Abnormality         Status                     ---------                               -----------         ------                     CBC Auto Differential[[id number]]        Abnormal            Final result                 Please view results for these tests on the individual orders.    CBC Auto Differential [[id number]]  (Abnormal) Collected: 09/17/20 1950    Specimen: Blood Updated: 09/17/20 2000     WBC 12.33 10*3/mm3      RBC 4.04 10*6/mm3      Hemoglobin 12.4 g/dL      Hematocrit 37.8 %      MCV 93.6 fL      MCH 30.7 pg      MCHC 32.8 g/dL      RDW 13.4 %      RDW-SD 46.1 fl      MPV 9.7 fL      Platelets 218 10*3/mm3      Neutrophil % 79.8 %      Lymphocyte % 8.0 %      Monocyte % 9.2 %      Eosinophil % 1.8 %      Basophil % 0.6 %      Immature Grans % 0.6 %      Neutrophils, Absolute 9.84 10*3/mm3      Lymphocytes,  Absolute 0.99 10*3/mm3      Monocytes, Absolute 1.14 10*3/mm3      Eosinophils, Absolute 0.22 10*3/mm3      Basophils, Absolute 0.07 10*3/mm3      Immature Grans, Absolute 0.07 10*3/mm3      nRBC 0.0 /100 WBC           Imaging Results (Last 24 Hours)     Procedure Component Value Units Date/Time    XR Chest 1 View [[id number]] Collected: 09/18/20 0031     Updated: 09/18/20 0033    Narrative:      CR Chest 1 Vw    INDICATION:   Emphysema. Presurgical evaluation for femoral fracture.     COMPARISON:    4/20/2018    FINDINGS:  Single portable AP view(s) of the chest.        The heart and mediastinal contours are normal. The lungs are clear. Emphysematous changes are again seen. No pneumothorax or pleural effusion.       Impression:      No acute cardiopulmonary findings.    Signer Name: Ricco Botello MD   Signed: 9/18/2020 12:31 AM   Workstation Name: [email or web address]    [facility]    XR Hip With or Without Pelvis 2 - 3 View Left [[id number]] Collected: 09/17/20 1910     Updated: 09/17/20 1913    Narrative:      CR Hip Uni Comp Min 2 Vws LT    INDICATION:   Tripped and fell at home today. Left hip pain    COMPARISON:   None available.    FINDINGS:  AP and frog-leg lateral view(s) of the left hip.  Displaced left femoral neck fracture with one half femoral neck width lateral and proximal migration of the distal fracture fragment. No underlying pathologic lesion. Degenerative changes lower lumbar  spine. Soft tissues unremarkable. No significant arthropathy of the hip.        Impression:      Displaced left femoral neck fracture.    Signer Name: HARJIT Mays MD   Signed: 9/17/2020 7:10 PM   Workstation Name: [facility]    [facility]          Patient Care Team:  Sandra Adkins MD as PCP - General  Sandra Adkins MD as PCP - Family Medicine  Rob Woods MD as Consulting Physician (Orthopedic Surgery)  Shade Nesbitt MD as Consulting Physician  (Ophthalmology)    SUBJECTIVE: She reports pain is well controlled.  She anticipates going to surgery today.    PHYSICAL EXAM  Left lower extremity skin is intact  Left lower extremity shortened and externally rotated  Thigh and calf soft  Neurovascular intact distally       Closed transcervical fracture of left femur (CMS/HCC)    Hypercholesteremia    Benign essential hypertension    At high risk for falls    COPD (chronic obstructive pulmonary disease) with chronic bronchitis (CMS/[facility])    Hypothyroidism (acquired)    Leukocytosis    Fall    Dementia (CMS/[facility])      PLAN / DISPOSITION: 89-year-old female with left hip displaced femoral neck fracture  -Plan to proceed with left hip hemiarthroplasty today, likely early afternoon pending OR availability  -Keep her n.p.o. and on bedrest  - for rehab placement, her daughter works for [employer] and at [facility]  -Mobilize with therapy postoperatively      Clarence Hill MD  09/18/20  07:18 EDT

## 2020-09-19 LAB
ANION GAP SERPL CALCULATED.3IONS-SCNC: 8 MMOL/L (ref 5–15)
BASOPHILS # BLD AUTO: 0.03 10*3/MM3 (ref 0–0.2)
BASOPHILS NFR BLD AUTO: 0.2 % (ref 0–1.5)
BUN SERPL-MCNC: 23 MG/DL (ref 8–23)
BUN/CREAT SERPL: 31.1 (ref 7–25)
CALCIUM SPEC-SCNC: 8.3 MG/DL (ref 8.2–9.6)
CHLORIDE SERPL-SCNC: 96 MMOL/L (ref 98–107)
CO2 SERPL-SCNC: 26 MMOL/L (ref 22–29)
CREAT SERPL-MCNC: 0.74 MG/DL (ref 0.57–1)
DEPRECATED RDW RBC AUTO: 46.4 FL (ref 37–54)
EOSINOPHIL # BLD AUTO: 0 10*3/MM3 (ref 0–0.4)
EOSINOPHIL NFR BLD AUTO: 0 % (ref 0.3–6.2)
ERYTHROCYTE [DISTWIDTH] IN BLOOD BY AUTOMATED COUNT: 13.6 % (ref 12.3–15.4)
GFR SERPL CREATININE-BSD FRML MDRD: 73 ML/MIN/1.73
GLUCOSE SERPL-MCNC: 102 MG/DL (ref 65–99)
HCT VFR BLD AUTO: 29.9 % (ref 34–46.6)
HGB BLD-MCNC: 9.5 G/DL (ref 12–15.9)
IMM GRANULOCYTES # BLD AUTO: 0.06 10*3/MM3 (ref 0–0.05)
IMM GRANULOCYTES NFR BLD AUTO: 0.4 % (ref 0–0.5)
LYMPHOCYTES # BLD AUTO: 1.07 10*3/MM3 (ref 0.7–3.1)
LYMPHOCYTES NFR BLD AUTO: 7.4 % (ref 19.6–45.3)
MCH RBC QN AUTO: 30.1 PG (ref 26.6–33)
MCHC RBC AUTO-ENTMCNC: 31.8 G/DL (ref 31.5–35.7)
MCV RBC AUTO: 94.6 FL (ref 79–97)
MONOCYTES # BLD AUTO: 1.86 10*3/MM3 (ref 0.1–0.9)
MONOCYTES NFR BLD AUTO: 12.9 % (ref 5–12)
NEUTROPHILS NFR BLD AUTO: 11.45 10*3/MM3 (ref 1.7–7)
NEUTROPHILS NFR BLD AUTO: 79.1 % (ref 42.7–76)
NRBC BLD AUTO-RTO: 0 /100 WBC (ref 0–0.2)
PLAT MORPH BLD: NORMAL
PLATELET # BLD AUTO: 176 10*3/MM3 (ref 140–450)
PMV BLD AUTO: 10.3 FL (ref 6–12)
POTASSIUM SERPL-SCNC: 3.1 MMOL/L (ref 3.5–5.2)
RBC # BLD AUTO: 3.16 10*6/MM3 (ref 3.77–5.28)
RBC MORPH BLD: NORMAL
SODIUM SERPL-SCNC: 130 MMOL/L (ref 136–145)
WBC # BLD AUTO: 14.47 10*3/MM3 (ref 3.4–10.8)
WBC MORPH BLD: NORMAL

## 2020-09-19 PROCEDURE — 63710000001 DIPHENHYDRAMINE PER 50 MG: Performed by: ORTHOPAEDIC SURGERY

## 2020-09-19 PROCEDURE — 25010000002 ENOXAPARIN PER 10 MG: Performed by: ORTHOPAEDIC SURGERY

## 2020-09-19 PROCEDURE — 97110 THERAPEUTIC EXERCISES: CPT

## 2020-09-19 PROCEDURE — 99225 PR SBSQ OBSERVATION CARE/DAY 25 MINUTES: CPT | Performed by: INTERNAL MEDICINE

## 2020-09-19 PROCEDURE — 80048 BASIC METABOLIC PNL TOTAL CA: CPT | Performed by: ORTHOPAEDIC SURGERY

## 2020-09-19 PROCEDURE — 25010000003 POTASSIUM CHLORIDE 10 MEQ/100ML SOLUTION: Performed by: INTERNAL MEDICINE

## 2020-09-19 PROCEDURE — 97166 OT EVAL MOD COMPLEX 45 MIN: CPT

## 2020-09-19 PROCEDURE — 85025 COMPLETE CBC W/AUTO DIFF WBC: CPT | Performed by: ORTHOPAEDIC SURGERY

## 2020-09-19 PROCEDURE — P9612 CATHETERIZE FOR URINE SPEC: HCPCS

## 2020-09-19 PROCEDURE — 97535 SELF CARE MNGMENT TRAINING: CPT

## 2020-09-19 PROCEDURE — 97162 PT EVAL MOD COMPLEX 30 MIN: CPT

## 2020-09-19 PROCEDURE — 85007 BL SMEAR W/DIFF WBC COUNT: CPT | Performed by: ORTHOPAEDIC SURGERY

## 2020-09-19 PROCEDURE — 25010000002 HYDROMORPHONE PER 4 MG: Performed by: INTERNAL MEDICINE

## 2020-09-19 RX ORDER — GABAPENTIN 100 MG/1
100 CAPSULE ORAL NIGHTLY
Status: DISCONTINUED | OUTPATIENT
Start: 2020-09-19 | End: 2020-09-22 | Stop reason: HOSPADM

## 2020-09-19 RX ORDER — POTASSIUM CHLORIDE 7.45 MG/ML
10 INJECTION INTRAVENOUS
Status: DISCONTINUED | OUTPATIENT
Start: 2020-09-19 | End: 2020-09-22 | Stop reason: HOSPADM

## 2020-09-19 RX ORDER — POTASSIUM CHLORIDE 1.5 G/1.77G
40 POWDER, FOR SOLUTION ORAL AS NEEDED
Status: DISCONTINUED | OUTPATIENT
Start: 2020-09-19 | End: 2020-09-22 | Stop reason: HOSPADM

## 2020-09-19 RX ORDER — POLYETHYLENE GLYCOL 3350 17 G/17G
17 POWDER, FOR SOLUTION ORAL DAILY
Status: DISCONTINUED | OUTPATIENT
Start: 2020-09-19 | End: 2020-09-22 | Stop reason: HOSPADM

## 2020-09-19 RX ORDER — POTASSIUM CHLORIDE 750 MG/1
40 CAPSULE, EXTENDED RELEASE ORAL AS NEEDED
Status: DISCONTINUED | OUTPATIENT
Start: 2020-09-19 | End: 2020-09-22 | Stop reason: HOSPADM

## 2020-09-19 RX ADMIN — SODIUM CHLORIDE, PRESERVATIVE FREE 10 ML: 5 INJECTION INTRAVENOUS at 19:56

## 2020-09-19 RX ADMIN — POTASSIUM CHLORIDE 10 MEQ: 7.46 INJECTION, SOLUTION INTRAVENOUS at 23:51

## 2020-09-19 RX ADMIN — DONEPEZIL HYDROCHLORIDE 5 MG: 5 TABLET ORAL at 19:55

## 2020-09-19 RX ADMIN — POTASSIUM CHLORIDE 10 MEQ: 7.46 INJECTION, SOLUTION INTRAVENOUS at 18:19

## 2020-09-19 RX ADMIN — POLYETHYLENE GLYCOL 3350 17 G: 17 POWDER, FOR SOLUTION ORAL at 12:34

## 2020-09-19 RX ADMIN — DIPHENHYDRAMINE HYDROCHLORIDE 25 MG: 25 CAPSULE ORAL at 19:55

## 2020-09-19 RX ADMIN — GABAPENTIN 100 MG: 100 CAPSULE ORAL at 19:55

## 2020-09-19 RX ADMIN — ACETAMINOPHEN 650 MG: 325 TABLET, FILM COATED ORAL at 09:20

## 2020-09-19 RX ADMIN — DOCUSATE SODIUM 50MG AND SENNOSIDES 8.6MG 2 TABLET: 8.6; 5 TABLET, FILM COATED ORAL at 09:20

## 2020-09-19 RX ADMIN — POTASSIUM CHLORIDE 10 MEQ: 7.46 INJECTION, SOLUTION INTRAVENOUS at 15:59

## 2020-09-19 RX ADMIN — DOCUSATE SODIUM 200 MG: 100 CAPSULE, LIQUID FILLED ORAL at 09:20

## 2020-09-19 RX ADMIN — CLINDAMYCIN IN 5 PERCENT DEXTROSE 900 MG: 18 INJECTION, SOLUTION INTRAVENOUS at 05:53

## 2020-09-19 RX ADMIN — ENOXAPARIN SODIUM 40 MG: 40 INJECTION SUBCUTANEOUS at 09:21

## 2020-09-19 RX ADMIN — POTASSIUM CHLORIDE 10 MEQ: 7.46 INJECTION, SOLUTION INTRAVENOUS at 22:47

## 2020-09-19 RX ADMIN — HYDROMORPHONE HYDROCHLORIDE 0.25 MG: 1 INJECTION, SOLUTION INTRAMUSCULAR; INTRAVENOUS; SUBCUTANEOUS at 21:34

## 2020-09-19 RX ADMIN — ACETAMINOPHEN 650 MG: 325 TABLET, FILM COATED ORAL at 18:19

## 2020-09-19 RX ADMIN — DOCUSATE SODIUM 200 MG: 100 CAPSULE, LIQUID FILLED ORAL at 19:55

## 2020-09-19 RX ADMIN — SERTRALINE HYDROCHLORIDE 25 MG: 25 TABLET ORAL at 09:21

## 2020-09-19 RX ADMIN — SODIUM CHLORIDE 50 ML/HR: 9 INJECTION, SOLUTION INTRAVENOUS at 12:34

## 2020-09-19 RX ADMIN — LEVOTHYROXINE SODIUM 100 MCG: 100 TABLET ORAL at 05:53

## 2020-09-19 NOTE — PLAN OF CARE
Goal Outcome Evaluation:  Plan of Care Reviewed With: patient      Patient is becoming increasingly confused, she pulled out her IV and her nerve block. Pt is retaining urine, she required to be in and out cathed. Vital signs stable, alert and oriented x4 will continue to monitor

## 2020-09-19 NOTE — PROGRESS NOTES
"    [facility]  PROGRESS NOTE    Patient Name: Darline Reed  : 1926  MRN: [id number]    Date of Admission: 2020  Primary Care Physician: Sandra Adkins MD    Subjective   Subjective     CC:  Fall w femur fx at home.     HPI:  She reports a 'bad night' but is vague about reasons.  Family thinks she is doing great, oob this morning to chair.      \"Just let me eat and drink and sleep.\"      Review of Systems - limited by dementia  No fever  Ate a third of breakfast  Had some foot pain last night (neuropathy) - didn't get her usual gabapentin    r Objective   Objective     Vital Signs:   Temp:  [97.8 °F (36.6 °C)-98.6 °F (37 °C)] 98.1 °F (36.7 °C)  Heart Rate:  [] 95  Resp:  [12-20] 16  BP: (114-184)/() 145/59        Physical Exam:  Gen:  WD/WN thin woman in NAD, up in chair, looks tired, dtr present.    Neuro: alert and oriented, clear speech, follows commands, grossly nonfocal  HEENT:  NC/AT PERRL, OP benign  Neck:  Supple, no LAD  Heart RRR no murmur, rub, or gallop  Lungs CTA nonlabored  Abd:  Soft, nontender, no rebound or guarding, pos BS  Extrem:  No c/c.  No ankle edema.  Skin warm and dry         Results Reviewed:  Results from last 7 days   Lab Units 20  1017 20  1950   WBC 10*3/mm3 12.94* 12.33*   HEMOGLOBIN g/dL 11.5* 12.4   HEMATOCRIT % 35.7 37.8   PLATELETS 10*3/mm3 208 218   INR  1.04  --      Results from last 7 days   Lab Units 20  1017 20  1950   SODIUM mmol/L 134* 133*   POTASSIUM mmol/L 3.5 3.6   CHLORIDE mmol/L 97* 97*   CO2 mmol/L 29.0 26.0   BUN mg/dL 18 16   CREATININE mg/dL 0.64 0.71   GLUCOSE mg/dL 91 113*   CALCIUM mg/dL 8.6 9.0     Estimated Creatinine Clearance: 31.4 mL/min (by C-G formula based on SCr of 0.64 mg/dL).    Microbiology Results Abnormal     Procedure Component Value - Date/Time    COVID PRE-OP / PRE-PROCEDURE SCREENING ORDER (NO ISOLATION) - Swab, Nasal Cavity [[id number]]  (Normal) " Collected: 09/18/20 0122    Lab Status: Final result Specimen: Swab from Nasal Cavity Updated: 09/18/20 0239    Narrative:      The following orders were created for panel order COVID PRE-OP / PRE-PROCEDURE SCREENING ORDER (NO ISOLATION) - Swab, Nasal Cavity.  Procedure                               Abnormality         Status                     ---------                               -----------         ------                     COVID-19, ABBOTT IN-HOUS...[[id number]]  Normal              Final result                 Please view results for these tests on the individual orders.    COVID-19, ABBOTT IN-HOUSE,NP Swab (NO TRANSPORT MEDIA) 2 HR TAT - Swab, Nasal Cavity [[id number]]  (Normal) Collected: 09/18/20 0122    Lab Status: Final result Specimen: Swab from Nasal Cavity Updated: 09/18/20 0239     COVID19 Not Detected    Narrative:      Fact sheet for providers: https://www.fda.gov/media/205040/download     Fact sheet for patients: https://www.fda.gov/media/504413/download          Imaging Results (Last 24 Hours)     Procedure Component Value Units Date/Time    XR Hip With or Without Pelvis 2 - 3 View Left [[id number]] Collected: 09/18/20 1527     Updated: 09/18/20 1638    Narrative:      EXAMINATION: XR HIP W OR WO PELVIS, 2-3 VIEW, LEFT-09/18/2020:      INDICATION: Post-Op Hip Arthroplasty; S72.032A-Displaced midcervical  fracture of left femur, initial encounter for closed fracture.      COMPARISON: 09/17/2020.     FINDINGS: Status post left total hip arthroplasty with expected  postsurgical changes in the adjacent soft tissues in grossly anatomic  alignment.           Impression:      Status post left total hip arthroplasty in grossly anatomic  alignment.     D:  09/18/2020  E:  09/18/2020                        I have reviewed the medications:  Scheduled Meds:docusate sodium, 200 mg, Oral, BID  donepezil, 5 mg, Oral, Nightly  enoxaparin, 40 mg, Subcutaneous, Daily  levothyroxine, 100 mcg, Oral, Q AM  oxybutynin  XL, 10 mg, Oral, Daily  pantoprazole, 40 mg, Oral, Daily  sertraline, 25 mg, Oral, Daily  sodium chloride, 10 mL, Intravenous, Q12H      Continuous Infusions:lactated ringers, 9 mL/hr, Last Rate: 9 mL/hr (09/18/20 1220)  ropivacaine (NAROPIN) 0.2% peripheral nerve cath (moog), 8 mL/hr, Last Rate: 8 mL/hr (09/18/20 1512)  sodium chloride, 50 mL/hr, Last Rate: 50 mL/hr (09/18/20 1558)      PRN Meds:.•  acetaminophen **OR** acetaminophen **OR** acetaminophen  •  acetaminophen **OR** acetaminophen  •  bisacodyl  •  bisacodyl  •  diphenhydrAMINE **OR** diphenhydrAMINE  •  docusate sodium  •  HYDROmorphone  •  HYDROmorphone **AND** naloxone  •  magnesium hydroxide  •  naloxone  •  ondansetron **OR** ondansetron  •  ondansetron **OR** ondansetron  •  oxyCODONE  •  oxyCODONE-acetaminophen  •  senna-docusate sodium  •  [COMPLETED] Insert peripheral IV **AND** sodium chloride  •  sodium chloride    Assessment/Plan   Assessment & Plan     Active Hospital Problems    Diagnosis  POA   • **Closed transcervical fracture of left femur (CMS/HCC) [S72.032A]  Yes   • Leukocytosis [D72.829]  Yes   • Fall [W19.XXXA]  Yes   • Dementia (CMS/HCC) [F03.90]  Yes   • At high risk for falls [Z91.81]  Not Applicable   • Benign essential hypertension [I10]  Yes   • COPD (chronic obstructive pulmonary disease) with chronic bronchitis (CMS/HCC) [J44.9]  Yes   • Hypothyroidism (acquired) [E03.9]  Yes   • Hypercholesteremia [E78.00]  Yes      Resolved Hospital Problems   No resolved problems to display.        Brief Hospital Course to date:  Darline Reed is a 89 y.o. female pretty independent at home, with  past medical history of hypertension, COPD, hypothyroidism, dementia. Fell at home, unwitnessed, left hip x-ray did show displaced left femoral neck fracture.      Displaced left femoral neck fracture  Mechanical fall   -POD 1 from hemiarthroplasty, Dr. Hill  - pain control  - mobilize  - bowel regimen      UA mildly abnl - doubt this is a UTI  but will treat if fever, progressive elev WBC.    Mild leukocytosis, follow      Dementia, mild, lives at home  -continue routine Aricept, Zoloft  -fall precautions  -case mgt consult     Hypothyroidism  -tsh nl       GERD -continue routine Prilosec (sub Protonix)         DVT prophylaxis:  Mechanical        Disposition: I expect the patient to be discharged tbd, expect SNF     CODE STATUS:   Code Status and Medical Interventions:   Ordered at: 09/17/20 2054     Limited Support to NOT Include:    Intubation     Level Of Support Discussed With:    Patient    Health Care Surrogate     Code Status:    CPR     Medical Interventions (Level of Support Prior to Arrest):    Limited     Comments:    no intubation

## 2020-09-19 NOTE — THERAPY EVALUATION
Patient Name: Darline Reed  : 1926    MRN: [id number]                              Today's Date: 2020       Admit Date: 2020    Visit Dx:     ICD-10-CM ICD-9-CM   1. Closed transcervical fracture of left femur, initial encounter (CMS/HCC)  S72.032A 820.02     Patient Active Problem List   Diagnosis   • Weight loss   • Chronic bilateral thoracic back pain   • Chronic right-sided low back pain with right-sided sciatica   • Senile osteoporosis   • Scoliosis (and kyphoscoliosis), idiopathic   • Memory loss   • Hypercholesteremia   • Urge incontinence of urine   • Benign essential hypertension   • Constipation by delayed colonic transit   • At high risk for falls   • Mild major depression, single episode (CMS/HCC)   • COPD (chronic obstructive pulmonary disease) with chronic bronchitis (CMS/HCC)   • Gastroesophageal reflux disease without esophagitis   • Hypothyroidism (acquired)   • Macular degeneration (senile) of retina   • Closed transcervical fracture of left femur (CMS/[facility])   • Leukocytosis   • Fall   • Dementia (CMS/[facility])     Past Medical History:   Diagnosis Date   • Abnormal PFTs 2010    Restriction and obstruction on PFT/s   • Arthritis    • Asthma    • Cachexia (CMS/[facility]) 3/19/2019   • COPD (chronic obstructive pulmonary disease) (CMS/HCC)    • Dementia (CMS/HCC)    • Diverticulitis    • Diverticulosis of colon without hemorrhage 3/19/2019   • Dyspnea    • Dyspnea on exertion 3/19/2019   • Falls frequently 3/19/2019   • GERD (gastroesophageal reflux disease)    • Hoarseness 3/19/2019   • Hypercholesteremia    • Hypertension 2016   • Hypothyroid    • Intractable acute post-traumatic headache 3/19/2019   • Intractable pain 2016   • Migraine 3/19/2019   • Osteoporosis    • Positive PPD    • Right knee meniscal tear    • Right knee sprain     conservative   • Scoliosis    • Weight loss 2016     Past Surgical History:   Procedure Laterality Date   • BLEPHAROPLASTY     • CATARACT  EXTRACTION  1999   • COSMETIC SURGERY     • TONSILLECTOMY  1947   • TONSILLECTOMY AND ADENOIDECTOMY       General Information     Row Name 09/19/20 0825          Physical Therapy Time and Intention    Document Type  evaluation  -ADRIEN     Mode of Treatment  physical therapy  -     Row Name 09/19/20 0825          General Information    Patient Profile Reviewed  yes  -ADRIEN     Prior Level of Function  independent:;ADL's;all household mobility;bed mobility;min assist:;using stairs  -ADRIEN     Existing Precautions/Restrictions  fall;left;hip, posterior;oxygen therapy device and L/min  -ADRIEN     Barriers to Rehab  previous functional deficit  -ADRIEN     Row Name 09/19/20 0825          Living Environment    Lives With  alone;other (see comments) has assistance of daughter initially  -ADRIEN     Row Name 09/19/20 0825          Home Main Entrance    Number of Stairs, Main Entrance  two  -ADRIEN     Stair Railings, Main Entrance  none  -ADRIEN     Row Name 09/19/20 0825          Stairs Within Home, Primary    Stairs, Within Home, Primary  0  -ADRIEN     Number of Stairs, Within Home, Primary  none  -ADRIEN     Row Name 09/19/20 0825          Cognition    Orientation Status (Cognition)  oriented to;person;time;verbal cues/prompts needed for orientation;disoriented to;place;situation  -ADRIEN     Row Name 09/19/20 0825          Safety Issues, Functional Mobility    Safety Issues Affecting Function (Mobility)  positioning of assistive device;sequencing abilities;safety precautions follow-through/compliance;safety precaution awareness;insight into deficits/self-awareness;awareness of need for assistance  -ADRIEN     Impairments Affecting Function (Mobility)  balance;endurance/activity tolerance;pain;postural/trunk control;range of motion (ROM);strength  -ADRIEN       User Key  (r) = Recorded By, (t) = Taken By, (c) = Cosigned By    Initials Name Provider Type    ADRIEN Jesus Lopez PT Physical Therapist        Mobility     Row Name 09/19/20 0825          Bed Mobility    Bed  Mobility  supine-sit;scooting/bridging  -ADRIEN     Scooting/Bridging [place] (Bed Mobility)  moderate assist (50% patient effort);2 person assist;verbal cues  -ADRIEN     Supine-Sit [place] (Bed Mobility)  moderate assist (50% patient effort);2 person assist;verbal cues  -ADRIEN     Assistive Device (Bed Mobility)  bed rails;draw sheet;head of bed elevated  -     Comment (Bed Mobility)  mod A for LE management off of EOB and trunk control into sitting  -     Row Name 09/19/20 0825          Transfers    Comment (Transfers)  Verbal cues for safe hand placement during standing/sitting, moving L LE out for comfort prior to sitting, and maintaining posterior hip precautions throughout  -     Row Name 09/19/20 0825          Bed-Chair Transfer    Bed-Chair [place] (Transfers)  moderate assist (50% patient effort);2 person assist;verbal cues  -ADRIEN     Assistive Device (Bed-Chair Transfers)  walker, front-wheeled  -     Row Name 09/19/20 0825          Sit-Stand Transfer    Sit-Stand [place] (Transfers)  minimum assist (75% patient effort);2 person assist;verbal cues  -ADRIEN     Assistive Device (Sit-Stand Transfers)  walker, front-wheeled  -ADRIEN     Row Name 09/19/20 0825          Gait/Stairs (Locomotion)    [place] Level (Gait)  verbal cues;moderate assist (50% patient effort);2 person assist  -     Assistive Device (Gait)  walker, front-wheeled  -     Distance in Feet (Gait)  2 feet  -     Deviations/Abnormal Patterns (Gait)  bilateral deviations;gait speed decreased;rainer decreased;stride length decreased;antalgic  -ADRIEN     Bilateral Gait Deviations  forward flexed posture  -ADRIEN     Left Sided Gait Deviations  heel strike decreased;weight shift ability decreased  -     [place] Level (Stairs)  not tested  -ADRIEN     Comment (Gait/Stairs)  Pt ambulated 2 feet from bed to chair with step to pattern and decreased speed. Verbal cues for maintaining upright posture, body within walker, and increase WB  through LEs. Gait limited by pain.  -[facility] Name 09/19/20 0825          Mobility    Extremity Weight-bearing Status  left lower extremity  -     Left Lower Extremity (Weight-bearing Status)  weight-bearing as tolerated (WBAT)  -       User Key  (r) = Recorded By, (t) = Taken By, (c) = Cosigned By    Initials Name Provider Type    ADRIEN Jesus Lopez PT Physical Therapist        Obj/Interventions     [facility] Name 09/19/20 0825          Range of Motion Comprehensive    Comment, General Range of Motion  R LE AROM WFL; L LE AROM impaired 25%  -[facility] Name 09/19/20 0825          Strength Comprehensive (MMT)    General Manual Muscle Testing (MMT) Assessment  lower extremity strength deficits identified  -     Comment, General Manual Muscle Testing (MMT) Assessment  R LE functionally 4/5; L LE functionally 4-/5  -[facility] Name 09/19/20 0825          Motor Skills    Therapeutic Exercise  hip;knee;ankle  -ADRIEN     Row Name 09/19/20 0825          Hip (Therapeutic Exercise)    Hip (Therapeutic Exercise)  isometric exercises  -     Hip Isometrics (Therapeutic Exercise)  gluteal sets;bilateral;aBduction;aDduction;10 repetitions  -ADRIEN     Row Name 09/19/20 0825          Knee (Therapeutic Exercise)    Knee (Therapeutic Exercise)  isometric exercises;AROM (active range of motion)  -     Knee AROM (Therapeutic Exercise)  bilateral;LAQ (long arc quad);heel slides;10 repetitions  -     Knee Isometrics (Therapeutic Exercise)  10 repetitions;quad sets  -ADRIEN     Row Name 09/19/20 0825          Ankle (Therapeutic Exercise)    Ankle (Therapeutic Exercise)  AROM (active range of motion)  -     Ankle AROM (Therapeutic Exercise)  bilateral;plantarflexion;10 repetitions;dorsiflexion  -ADRIEN     Row Name 09/19/20 0825          Balance    Dynamic Standing Balance  mild impairment;supported  -ADRIEN     Row Name 09/19/20 0825          Sensory Assessment (Somatosensory)    Sensory Assessment (Somatosensory)  sensation intact;LE sensation  intact  -ADRIEN       User Key  (r) = Recorded By, (t) = Taken By, (c) = Cosigned By    Initials Name Provider Type    Jesus Hamilton, PT Physical Therapist        Goals/Plan     Row Name 09/19/20 0825          Bed Mobility Goal 1 (PT)    Activity/Assistive Device (Bed Mobility Goal 1, PT)  bed mobility activities, all  -ADRIEN     [place] Level/Cues Needed (Bed Mobility Goal 1, PT)  minimum assist (75% or more patient effort)  -ADRIEN     Time Frame (Bed Mobility Goal 1, PT)  long term goal (LTG);3 days  -ADRIEN     Row Name 09/19/20 0825          Transfer Goal 1 (PT)    Activity/Assistive Device (Transfer Goal 1, PT)  sit-to-stand/stand-to-sit;walker, rolling  -ADRIEN     [place] Level/Cues Needed (Transfer Goal 1, PT)  contact guard assist  -ADRIEN     Time Frame (Transfer Goal 1, PT)  long term goal (LTG);3 days  -ADRIEN     Row Name 09/19/20 0825          Gait Training Goal 1 (PT)    Activity/Assistive Device (Gait Training Goal 1, PT)  gait (walking locomotion);walker, rolling  -ADRIEN     [place] Level (Gait Training Goal 1, PT)  minimum assist (75% or more patient effort)  -ADRIEN     Distance (Gait Training Goal 1, PT)  50 feet  -ADRIEN     Time Frame (Gait Training Goal 1, PT)  long term goal (LTG);3 days  -ADRIEN       User Key  (r) = Recorded By, (t) = Taken By, (c) = Cosigned By    Initials Name Provider Type    Jesus Hamilton, PT Physical Therapist        Clinical Impression     Row Name 09/19/20 0825          Pain    Additional Documentation  Pain Scale: Numbers Pre/Post-Treatment (Group)  -ADRIEN     Row Name 09/19/20 0825          Pain Scale: Numbers Pre/Post-Treatment    Pretreatment Pain Rating  3/10  -ADRIEN     Posttreatment Pain Rating  6/10  -ADRIEN     Pain Location - Side  Left  -ADRIEN     Pain Location - Orientation  incisional  -ADRIEN     Pain Location  hip  -ADRIEN     Pain Intervention(s)  Cold applied;Repositioned;Ambulation/increased activity  -[facility] Name 09/19/20 0825          Therapy Assessment/Plan (PT)    Patient/Family Therapy  Goals Statement (PT)  Improve function  -ADRIEN     Rehab Potential (PT)  good, to achieve stated therapy goals  -ADRIEN     Criteria for Skilled Interventions Met (PT)  yes;skilled treatment is necessary  -ARDIEN     Row Name 09/19/20 0825          Positioning and Restraints    Pre-Treatment Position  in bed  -ADRIEN     Post Treatment Position  chair  -ADRIEN     In Chair  notified nsg;reclined;call light within reach;encouraged to call for assist;exit alarm on;with family/caregiver;legs elevated;on mechanical lift sling;waffle cushion  -       User Key  (r) = Recorded By, (t) = Taken By, (c) = Cosigned By    Initials Name Provider Type    Jesus Hamilton, PT Physical Therapist        Outcome Measures     Row Name 09/19/20 0825          How much help from another person do you currently need...    Turning from your back to your side while in flat bed without using bedrails?  2  -ADRIEN     Moving from lying on back to sitting on the side of a flat bed without bedrails?  2  -ADRIEN     Moving to and from a bed to a chair (including a wheelchair)?  2  -ADRIEN     Standing up from a chair using your arms (e.g., wheelchair, bedside chair)?  2  -ADRIEN     Climbing 3-5 steps with a railing?  1  -ADRIEN     To walk in hospital room?  2  -ADRIEN     AM-PAC 6 Clicks Score (PT)  11  -     Row Name 09/19/20 0825          Functional Assessment    Outcome Measure Options  AM-PAC 6 Clicks Basic Mobility (PT)  -       User Key  (r) = Recorded By, (t) = Taken By, (c) = Cosigned By    Initials Name Provider Type    Jesus Hamilton, PT Physical Therapist        Physical Therapy Education                 Title: PT OT SLP Therapies (In Progress)     Topic: Physical Therapy (In Progress)     Point: Mobility training (In Progress)     Learning Progress Summary           Patient Acceptance, D,E, NR by ADRIEN at 9/19/2020 0825    Comment: Educated on safe sequencing with bed mobility, ambulatory transfers, and gait. Reviewed HEP and posterior hip precautions.    Acceptance, E, VU  by LG at 9/19/2020 0245   Family Acceptance, D,E, NR by ADRIEN at 9/19/2020 0825    Comment: Educated on safe sequencing with bed mobility, ambulatory transfers, and gait. Reviewed HEP and posterior hip precautions.                   Point: Home exercise program (In Progress)     Learning Progress Summary           Patient Acceptance, D,E, NR by ADRIEN at 9/19/2020 0825    Comment: Educated on safe sequencing with bed mobility, ambulatory transfers, and gait. Reviewed HEP and posterior hip precautions.    Acceptance, E, VU by LG at 9/19/2020 0245   Family Acceptance, D,E, NR by ADRIEN at 9/19/2020 0825    Comment: Educated on safe sequencing with bed mobility, ambulatory transfers, and gait. Reviewed HEP and posterior hip precautions.                   Point: Body mechanics (In Progress)     Learning Progress Summary           Patient Acceptance, D,E, NR by ADRIEN at 9/19/2020 0825    Comment: Educated on safe sequencing with bed mobility, ambulatory transfers, and gait. Reviewed HEP and posterior hip precautions.    Acceptance, E, VU by HENRY at 9/19/2020 0245   Family Acceptance, D,E, NR by ADRIEN at 9/19/2020 0825    Comment: Educated on safe sequencing with bed mobility, ambulatory transfers, and gait. Reviewed HEP and posterior hip precautions.                   Point: Precautions (In Progress)     Learning Progress Summary           Patient Acceptance, D,E, NR by ADRIEN at 9/19/2020 0825    Comment: Educated on safe sequencing with bed mobility, ambulatory transfers, and gait. Reviewed HEP and posterior hip precautions.    Acceptance, E, VU by LG at 9/19/2020 0245   Family Acceptance, D,E, NR by ADRIEN at 9/19/2020 0825    Comment: Educated on safe sequencing with bed mobility, ambulatory transfers, and gait. Reviewed HEP and posterior hip precautions.                               User Key     Initials Effective Dates Name Provider Type Discipline     11/16/18 -  Govind Chilel RN Registered Nurse Nurse    ADRIEN 09/10/19 -  John  Jesus PT Physical Therapist PT              PT Recommendation and Plan  Planned Therapy Interventions (PT): balance training, bed mobility training, gait training, home exercise program, patient/family education, strengthening, transfer training  Plan of Care Reviewed With: patient, son  Progress: improving  Outcome Summary: PT eval complete. Pt ambulated 2 feet from bed-chair using RW and mod Ax2 for balance and AD management. Gait limited by pain. Bed mobility performed with mod Ax2 and STS with min A x2. Reviewed HEP and posterior hip precautions. Mobility tasks limited by pt's pain. Recommend pt d/c to SNF when appropriate.     Time Calculation:   PT Charges     Row Name 09/19/20 0825             Time Calculation    Start Time  0825  -ADRIEN      PT Received On  09/19/20  -      PT Goal Re-Cert Due Date  09/29/20  -         Time Calculation- PT    Total Timed Code Minutes- PT  10 minute(s)  -ADRIEN         Timed Charges    61112 - PT Therapeutic Exercise Minutes  4  -ADRIEN      60405 - Gait Training Minutes   2  -ADRIEN      20542 - PT Therapeutic Activity Minutes  4  -ADRIEN        User Key  (r) = Recorded By, (t) = Taken By, (c) = Cosigned By    Initials Name Provider Type    Jesus Hamilton, PT Physical Therapist        Therapy Charges for Today     Code Description Service Date Service Provider Modifiers Qty    44006429331 HC PT THER PROC EA 15 MIN 9/19/2020 Jesus Lopez, PT GP 1    42631931875 HC PT EVAL MOD COMPLEXITY 3 9/19/2020 Jesus Lopez, PT GP 1          PT G-Codes  Outcome Measure Options: AM-PAC 6 Clicks Daily Activity (OT)  AM-PAC 6 Clicks Score (PT): 11  AM-PAC 6 Clicks Score (OT): 13    Jesus Lopez PT  9/19/2020

## 2020-09-19 NOTE — PROGRESS NOTES
Orthopedic Daily Progress Note      CC: HEIDY overnight.    Pain well controlled  General: no fevers, chills  Abdomen: no nausea, vomiting, or diarrhea    No other complaints    Physical Exam:  I have reviewed the vital signs.  Temp:  [97.8 °F (36.6 °C)-98.6 °F (37 °C)] 98.1 °F (36.7 °C)  Heart Rate:  [] 80  Resp:  [12-20] 16  BP: (114-184)/() 145/81    Objective  General Appearance:    Alert, cooperative, no distress  Extremities: No clubbing, cyanosis, or edema to lower extremities  Pulses:  2+ in distal surgical extremity  Skin: Incision Clean/dry/intact      Results Review:    I have reviewed the labs, radiology results and diagnostic studies:Hgb 9.5    Results from last 7 days   Lab Units 09/19/20  0732   WBC 10*3/mm3 14.47*   HEMOGLOBIN g/dL 9.5*   PLATELETS 10*3/mm3 176     Results from last 7 days   Lab Units 09/19/20  0732   SODIUM mmol/L 130*   POTASSIUM mmol/L 3.1*   CO2 mmol/L 26.0   CREATININE mg/dL 0.74   GLUCOSE mg/dL 102*       I have reviewed the medications.    Assessment/Problem List  POD# 1 Day Post-Op   S/p L hip hemiarthroplasty for femoral neck fracture    Plan  WBAT LLE with post hip precautions x 6 weeks  PT/OT  DVT ppx with Lovenox 40 mg sq daily and mechanical        Discharge Planning: I expect patient to be discharged to TBD in TBD days.    Sigifredo Mariee Jr., MD  09/19/20  11:24 EDT

## 2020-09-19 NOTE — PLAN OF CARE
Problem: Adult Inpatient Plan of Care  Goal: Plan of Care Review  Flowsheets (Taken 9/19/2020 0110)  Progress: improving  Plan of Care Reviewed With:   patient   son  Outcome Summary: PT eval complete. Pt ambulated 2 feet from bed-chair using RW and mod Ax2 for balance and AD management. Gait limited by pain. Bed mobility performed with mod Ax2 and STS with min A x2. Reviewed HEP and posterior hip precautions. Mobility tasks limited by pt's pain. Recommend pt d/c to SNF when appropriate.

## 2020-09-19 NOTE — PLAN OF CARE
Problem: Adult Inpatient Plan of Care  Goal: Plan of Care Review  Recent Flowsheet Documentation  Taken 9/19/2020 0945 by Belia Lynne OT  Progress: (OT IE) no change  Plan of Care Reviewed With:   patient   son  Outcome Summary:   OT IE completed posst chart review. Pt presents w/ decreased I in ADLs, related t/f compared to PLOF. Pt limited by pain, decreaed fxl endurance, muscle weakness at BUEs, impaired balance and limited distal reach w/ precautions and pain impacting LBB, LBD. Pt oriented to person, partial time, disoriented to place and situation, pt not cognizant of posterior hip precautions. Pt demonstrated need for mod A x 2 and increased time to complete bed mobility (supine > sitting, scooting to EOB), dep for scooting back in chair, min A x 2 for sit < > Stand t/f at EOB and mod A x 2 for SPT to chair from bed w/ FWW. Pt req'd skilled cues for seq, postural alignment (to stand upright) and further to advance AD w/ body mvmt. Pt dep for d/d socks and general LBD d/t inability to modify approach and adhere to precautions. OT will plan to issue AE in upcoming session and teach pt on use, purpose for increased I and safety given pt's cognition supports new learning. Pt able to complete hair groomimg w/ max A, hand hygiene w/ I after set up. Pt presents w/ weakness at BUEs. Pt will benefit from further IPOT POC and recommend SNF at d/c based on underlying impairments, fact pt lives alone and impact of impairments on fxn.

## 2020-09-19 NOTE — THERAPY EVALUATION
Patient Name: Darline Reed  : 1926    MRN: [id number]                              Today's Date: 2020       Admit Date: 2020    Visit Dx:     ICD-10-CM ICD-9-CM   1. Closed transcervical fracture of left femur, initial encounter (CMS/HCC)  S72.032A 820.02     Patient Active Problem List   Diagnosis   • Weight loss   • Chronic bilateral thoracic back pain   • Chronic right-sided low back pain with right-sided sciatica   • Senile osteoporosis   • Scoliosis (and kyphoscoliosis), idiopathic   • Memory loss   • Hypercholesteremia   • Urge incontinence of urine   • Benign essential hypertension   • Constipation by delayed colonic transit   • At high risk for falls   • Mild major depression, single episode (CMS/HCC)   • COPD (chronic obstructive pulmonary disease) with chronic bronchitis (CMS/HCC)   • Gastroesophageal reflux disease without esophagitis   • Hypothyroidism (acquired)   • Macular degeneration (senile) of retina   • Closed transcervical fracture of left femur (CMS/[facility])   • Leukocytosis   • Fall   • Dementia (CMS/[facility])     Past Medical History:   Diagnosis Date   • Abnormal PFTs 2010    Restriction and obstruction on PFT/s   • Arthritis    • Asthma    • Cachexia (CMS/[facility]) 3/19/2019   • COPD (chronic obstructive pulmonary disease) (CMS/HCC)    • Dementia (CMS/HCC)    • Diverticulitis    • Diverticulosis of colon without hemorrhage 3/19/2019   • Dyspnea    • Dyspnea on exertion 3/19/2019   • Falls frequently 3/19/2019   • GERD (gastroesophageal reflux disease)    • Hoarseness 3/19/2019   • Hypercholesteremia    • Hypertension 2016   • Hypothyroid    • Intractable acute post-traumatic headache 3/19/2019   • Intractable pain 2016   • Migraine 3/19/2019   • Osteoporosis    • Positive PPD    • Right knee meniscal tear    • Right knee sprain     conservative   • Scoliosis    • Weight loss 2016     Past Surgical History:   Procedure Laterality Date   • BLEPHAROPLASTY     • CATARACT  EXTRACTION  1999   • COSMETIC SURGERY     • TONSILLECTOMY  1947   • TONSILLECTOMY AND ADENOIDECTOMY       General Information     Row Name 09/19/20 0848          OT Time and Intention    Document Type  evaluation  -JY     Mode of Treatment  occupational therapy  -JY     Row Name 09/19/20 0848          General Information    Patient Profile Reviewed  yes  -JY     Prior Level of Function  independent:;feeding;grooming;dressing;transfer;all household mobility;bed mobility;min assist:;community mobility;using stairs;bathing;dependent:;home management;cooking;cleaning;driving  -JY     Existing Precautions/Restrictions  fall;left;hip, posterior;oxygen therapy device and L/min  -JY     Barriers to Rehab  previous functional deficit  -JY     Row Name 09/19/20 0848          Living Environment    Lives With  alone;other (see comments) family frequently checks in on patient and A as needed  -JY     Row Name 09/19/20 0848          Home Main Entrance    Number of Stairs, Main Entrance  two  -JY     Stair Railings, Main Entrance  none  -JY     Row Name 09/19/20 0848          Stairs Within Home, Primary    Stairs, Within Home, Primary  0  -JY     Number of Stairs, Within Home, Primary  none  -JY     Stairs Comment, Within Home, Primary  pt at PLOF received A ascending, descending stairs  -JY     Row Name 09/19/20 0848          Cognition    Orientation Status (Cognition)  oriented to;person;time;verbal cues/prompts needed for orientation;disoriented to;place;situation  -JY     Row Name 09/19/20 0848          Safety Issues, Functional Mobility    Safety Issues Affecting Function (Mobility)  insight into deficits/self-awareness;positioning of assistive device;safety precaution awareness;safety precautions follow-through/compliance;sequencing abilities  -JY     Impairments Affecting Function (Mobility)  balance;endurance/activity tolerance;pain;postural/trunk control;range of motion (ROM);strength  -JY     Comment, Safety  Issues/Impairments (Mobility)  pt required increased time, skilled cues to advance in mobility; pt limited by perceived deficits and pain; further cues for upright posture for improved stability  -JY       User Key  (r) = Recorded By, (t) = Taken By, (c) = Cosigned By    Initials Name Provider Type    Belia Grier OT Occupational Therapist        Mobility/ADL's     Row Name 09/19/20 0858          Bed Mobility    Bed Mobility  supine-sit;scooting/bridging  -JY     Scooting/Bridging [place] (Bed Mobility)  moderate assist (50% patient effort);2 person assist;verbal cues  -JY     Supine-Sit [place] (Bed Mobility)  moderate assist (50% patient effort);2 person assist;verbal cues  -JY     Bed Mobility, Safety Issues  decreased use of arms for pushing/pulling;decreased use of legs for bridging/pushing  -JY     Assistive Device (Bed Mobility)  bed rails;draw sheet;head of bed elevated  -JY     Comment (Bed Mobility)  increased time to advance LLE to edge of bed, increased effort to upright trunk into sitting  -JY     Row Name 09/19/20 0858          Transfers    Transfers  sit-stand transfer;bed-chair transfer  -JY     Comment (Transfers)  v/c for optimal hand placement to control ascend, descend; reviewed L posterior hip precautions of which pt not cognizant of; increased time and cues to pivot w/ AD mgmt simultaneous w/ body mvmt  -JY     Bed-Chair [place] (Transfers)  moderate assist (50% patient effort);2 person assist;verbal cues  -JY     Assistive Device (Bed-Chair Transfers)  walker, front-wheeled  -JY     Sit-Stand [place] (Transfers)  minimum assist (75% patient effort);2 person assist;verbal cues  -JY     Row Name 09/19/20 0858          Sit-Stand Transfer    Assistive Device (Sit-Stand Transfers)  walker, front-wheeled  -JY     Row Name 09/19/20 0858          Functional Mobility    Functional Mobility- Ind. Level  other (see comments) defer to PT for specifics  -JY     Functional  Mobility- Comment  did not progress past few steps to chair d/t patient pain and processing needs for LE and AD advancement  -     Row Name 09/19/20 0858          Activities of Daily Living    41219 - OT Self Care/Mgmt Minutes  8  -JY     BADL Assessment/Intervention  lower body dressing;grooming  -[facility] Name 09/19/20 0858          Mobility    Extremity Weight-bearing Status  left lower extremity  -JY     Left Lower Extremity (Weight-bearing Status)  weight-bearing as tolerated (WBAT)  -[facility] Name 09/19/20 0858          Lower Body Dressing Assessment/Training    [place] Level (Lower Body Dressing)  doff;don;socks;dependent (less than 25% patient effort)  -JY     Position (Lower Body Dressing)  supported sitting  -JY     Comment (Lower Body Dressing)  pt dep for d/d socks d/t pain and inability to modify tech and still adhere to L hip precautions, will issue AE and provide training in future sessions  -[facility] Name 09/19/20 0858          Grooming Assessment/Training    [place] Level (Grooming)  hair care, combing/brushing;maximum assist (25% patient effort);wash face, hands;independent;set up;verbal cues  -JY     Position (Grooming)  supported sitting  -JY     Comment (Grooming)  pt deferred completion of hair grooming past very initial attempt, completed hand hygiene w/ no A after set up  -JY       User Key  (r) = Recorded By, (t) = Taken By, (c) = Cosigned By    Initials Name Provider Type    Belia Grier OT Occupational Therapist        Obj/Interventions     Row Name 09/19/20 0940          Sensory Assessment (Somatosensory)    Sensory Assessment (Somatosensory)  sensation intact;bilateral UE  -JY     Bilateral UE Sensory Assessment  general sensation;light touch awareness  -     Row Name 09/19/20 0940          Vision Assessment/Intervention    Visual Impairment/Limitations  corrective lenses for distance;other (see comments) has corrective eyewear yet doesn't wear full time  -DEBRA      Row Name 09/19/20 0940          Range of Motion Comprehensive    General Range of Motion  bilateral upper extremity ROM WFL  -JY     Row Name 09/19/20 0940          Strength Comprehensive (MMT)    General Manual Muscle Testing (MMT) Assessment  upper extremity strength deficits identified  -JY     Comment, General Manual Muscle Testing (MMT) Assessment  BUEs grossly 4-/5  -JY     Row Name 09/19/20 0940          Upper Extremity (Manual Muscle Testing)    Comment, MMT: Upper Extremity  BUEs grossly 4-/5  -JY     Row Name 09/19/20 0940          Balance    Balance Assessment  sitting static balance;sitting dynamic balance;standing static balance;standing dynamic balance  -JY     Static Sitting Balance  WFL;supported;sitting, edge of bed  -JY     Dynamic Sitting Balance  mild impairment;supported;sitting in chair;other (see comments) pt mild LOB with reach away from JUAN CARLOS w/ ADL related tasks  -JY     Static Standing Balance  mild impairment;supported;other (see comments)  -JY     Dynamic Standing Balance  mild impairment;supported;other (see comments) fxl transfer to chair  -JY       User Key  (r) = Recorded By, (t) = Taken By, (c) = Cosigned By    Initials Name Provider Type    Belia Grier OT Occupational Therapist        Goals/Plan     Row Name 09/19/20 0956          Transfer Goal 1 (OT)    Activity/Assistive Device (Transfer Goal 1, OT)  sit-to-stand/stand-to-sit;bed-to-chair/chair-to-bed;toilet;commode, bedside without drop arms;walker, rolling;other (see comments) progress to toilet t/f past BSC  -JY     [place] Level/Cues Needed (Transfer Goal 1, OT)  moderate assist (50-74% patient effort);verbal cues required  -JY     Time Frame (Transfer Goal 1, OT)  long term goal (LTG);by discharge  -JY     Strategies/Barriers (Transfers Goal 1, OT)  w/ recommended AD/AE  -JY     Progress/Outcome (Transfer Goal 1, OT)  goal ongoing  -JY     Row Name 09/19/20 0956          Bathing Goal 1 (OT)    Activity/Device  (Bathing Goal 1, OT)  lower body bathing;long-handled sponge;other (see comments) sponge bathing w/ AE  -JY     [place] Level/Cues Needed (Bathing Goal 1, OT)  minimum assist (75% or more patient effort);verbal cues required  -JY     Time Frame (Bathing Goal 1, OT)  long term goal (LTG);by discharge  -JY     Progress/Outcomes (Bathing Goal 1, OT)  goal ongoing  -JY     Row Name 09/19/20 0956          Dressing Goal 1 (OT)    Activity/Device (Dressing Goal 1, OT)  lower body dressing;long-handled shoe horn;reacher;sock-aid;other (see comments) pants/socks/undergarments w/ recommended AD/AE  -JY     [place]/Cues Needed (Dressing Goal 1, OT)  minimum assist (75% or more patient effort);verbal cues required  -JY     Time Frame (Dressing Goal 1, OT)  long term goal (LTG);by discharge  -JY     Strategies/Barriers (Dressing Goal 1, OT)  w/ recommended AD/AE  -JY     Progress/Outcome (Dressing Goal 1, OT)  goal ongoing  -JY     Row Name 09/19/20 0956          Toileting Goal 1 (OT)    Activity/Device (Toileting Goal 1, OT)  adjust/manage clothing;perform perineal hygiene;commode, bedside without drop arms;other (see comments) progress to toilet when able  -JY     [place] Level/Cues Needed (Toileting Goal 1, OT)  moderate assist (50-74% patient effort);verbal cues required  -JY     Time Frame (Toileting Goal 1, OT)  long term goal (LTG);by discharge  -JY     Strategies/Barriers (Toileting Goal 1, OT)  w/ recommended AD/AE  -JY     Row Name 09/19/20 0956          Strength Goal 1 (OT)    Strength Goal 1 (OT)  Pt to complete seated HEP encompassing BUEs x 10 reps and tolerable resistance in order to progress strength, ROM for integrated use in ADLs, related t/f.  -JY     Time Frame (Strength Goal 1, OT)  long term goal (LTG);by discharge  -JY     Progress/Outcome (Strength Goal 1, OT)  goal ongoing  -JY       User Key  (r) = Recorded By, (t) = Taken By, (c) = Cosigned By    Initials Name Provider Type    GILLIAN Lynne  ENA Celaya Occupational Therapist        Clinical Impression     Row Name 09/19/20 0945          Pain Assessment    Additional Documentation  Pain Scale: Numbers Pre/Post-Treatment (Group)  -JY     Row Name 09/19/20 0945          Pain Scale: Numbers Pre/Post-Treatment    Pretreatment Pain Rating  3/10  -JY     Posttreatment Pain Rating  6/10  -JY     Pain Location - Side  Left  -JY     Pain Location - Orientation  posterior  -JY     Pain Location  hip  -JY     Pre/Posttreatment Pain Comment  pt rates L hip pain laterally and posteriorly  -JY     Pain Intervention(s)  Cold applied;Repositioned;Ambulation/increased activity;Cold pack;Elevated  -JY     Row Name 09/19/20 0945          Plan of Care Review    Plan of Care Reviewed With  patient;nicholas  -GILLIAN     Progress  no change OT IE  -JY     Outcome Summary  OT IE completed posst charty review. Pt presents w/ decreased I in ADLs, related t/f compared to PLOF. Pt limited by pain, decreaed fxl endurance, muscle weakness at BUEs, impaired balance and limited distal reach w/ precautions and pain impacting LBB, LBD. Pt oriented to person, partial time, disoriented to place and situation; pt not cognizant of posterior hip precautions. Pt demonstrated need for mod A x 2 and increased time to complete bed mobility (supine > sitting, scooting to EOB), dep for scooting back in chair, min A x 2 for sit < > Stand t/f at EOB and mod A x 2 for SPT to chair from bed w/ FWW. Pt req'd skilled cues for seq, postural alignment (to stand upright) and further to advance AD w/ body mvmt. Pt dep for d/d socks and general LBD d/t inability to modify approach and adhere to precautions. OT will plan to issue AE in upcoming session and teach pt on use, purpose for increased I and safety given pt's cognition supports new learning. Pt able to complete hair groomimg w/ max A, hand hygiene w/ I after set up. Pt presents w/ weakness at BUEs. Pt will benefit from further IPOT POC and recommend SNF at  d/c based on underlying impairments, fact pt lives alone and impact of impairments on fxn.  -JY     Row Name 09/19/20 0945          Therapy Assessment/Plan (OT)    Patient/Family Therapy Goal Statement (OT)  to increase I in ADLs, related t/f, return to PLOF  -JY     Rehab Potential (OT)  good, to achieve stated therapy goals  -JY     Criteria for Skilled Therapeutic Interventions Met (OT)  yes;skilled treatment is necessary  -JY     Therapy Frequency (OT)  daily  -JY     Row Name 09/19/20 0945          Therapy Plan Review/Discharge Plan (OT)    Equipment Needs Upon Discharge (OT)  walker, rolling;dressing equipment;bathing equipment;hip kit  -JY     Anticipated Discharge Disposition (OT)  skilled nursing facility  -JY     Row Name 09/19/20 0945          Vital Signs    Pre Systolic BP Rehab  145  -JY     Pre Treatment Diastolic BP  81  -JY     Pretreatment Heart Rate (beats/min)  77  -JY     Pre SpO2 (%)  99  -JY     O2 Delivery Pre Treatment  supplemental O2  -JY     Post SpO2 (%)  98  -JY     O2 Delivery Post Treatment  supplemental O2  -JY     Pre Patient Position  Supine  -JY     Intra Patient Position  Standing  -JY     Post Patient Position  Sitting  -JY     Row Name 09/19/20 0945          Positioning and Restraints    Pre-Treatment Position  in bed  -JY     Post Treatment Position  chair  -JY     In Chair  notified nsg;reclined;call light within reach;encouraged to call for assist;exit alarm on;with family/caregiver;waffle cushion;on mechanical lift sling;legs elevated ice pack donned to L hip  -JY       User Key  (r) = Recorded By, (t) = Taken By, (c) = Cosigned By    Initials Name Provider Type    Belia Grier, ENA Occupational Therapist        Outcome Measures     Row Name 09/19/20 1004          How much help from another is currently needed...    Putting on and taking off regular lower body clothing?  1  -JY     Bathing (including washing, rinsing, and drying)  2  -JY     Toileting (which includes  using toilet bed pan or urinal)  1  -JY     Putting on and taking off regular upper body clothing  3  -JY     Taking care of personal grooming (such as brushing teeth)  3  -JY     Eating meals  3  -JY     AM-PAC 6 Clicks Score (OT)  13  -JY     Row Name 09/19/20 1004          Functional Assessment    Outcome Measure Options  AM-PAC 6 Clicks Daily Activity (OT)  -J       User Key  (r) = Recorded By, (t) = Taken By, (c) = Cosigned By    Initials Name Provider Type    Belia Grier OT Occupational Therapist        Occupational Therapy Education                 Title: PT OT SLP Therapies (In Progress)     Topic: Occupational Therapy (In Progress)     Point: ADL training (In Progress)     Description:   Instruct learner(s) on proper safety adaptation and remediation techniques during self care or transfers.   Instruct in proper use of assistive devices.              Learning Progress Summary           Patient Acceptance, E,D, NR by GILLIAN at 9/19/2020 0811                   Point: Home exercise program (Not Started)     Description:   Instruct learner(s) on appropriate technique for monitoring, assisting and/or progressing therapeutic exercises/activities.              Learner Progress:  Not documented in this visit.          Point: Precautions (In Progress)     Description:   Instruct learner(s) on prescribed precautions during self-care and functional transfers.              Learning Progress Summary           Patient Acceptance, E,D, NR by GILLIAN at 9/19/2020 0811                   Point: Body mechanics (In Progress)     Description:   Instruct learner(s) on proper positioning and spine alignment during self-care, functional mobility activities and/or exercises.              Learning Progress Summary           Patient Acceptance, E,D, NR by GILLIAN at 9/19/2020 0811                               User Key     Initials Effective Dates Name Provider Type Discipline    GILLIAN 01/29/20 -  Belia Lynne OT Occupational Therapist OT               OT Recommendation and Plan  Retired Outcome Summary/Treatment Plan (OT)  Anticipated Discharge Disposition (OT): skilled nursing facility  Therapy Frequency (OT): daily  Plan of Care Review  Plan of Care Reviewed With: patient, son  Progress: no change(OT IE)  Outcome Summary: OT IE completed posst charty review. Pt presents w/ decreased I in ADLs, related t/f compared to PLOF. Pt limited by pain, decreaed fxl endurance, muscle weakness at BUEs, impaired balance and limited distal reach w/ precautions and pain impacting LBB, LBD. Pt oriented to person, partial time, disoriented to place and situation; pt not cognizant of posterior hip precautions. Pt demonstrated need for mod A x 2 and increased time to complete bed mobility (supine > sitting, scooting to EOB), dep for scooting back in chair, min A x 2 for sit < > Stand t/f at EOB and mod A x 2 for SPT to chair from bed w/ FWW. Pt req'd skilled cues for seq, postural alignment (to stand upright) and further to advance AD w/ body mvmt. Pt dep for d/d socks and general LBD d/t inability to modify approach and adhere to precautions. OT will plan to issue AE in upcoming session and teach pt on use, purpose for increased I and safety given pt's cognition supports new learning. Pt able to complete hair groomimg w/ max A, hand hygiene w/ I after set up. Pt presents w/ weakness at BUEs. Pt will benefit from further IPOT POC and recommend SNF at d/c based on underlying impairments, fact pt lives alone and impact of impairments on fxn.  Plan of Care Reviewed With: patient, son  Outcome Summary: OT IE completed posst charty review. Pt presents w/ decreased I in ADLs, related t/f compared to PLOF. Pt limited by pain, decreaed fxl endurance, muscle weakness at BUEs, impaired balance and limited distal reach w/ precautions and pain impacting LBB, LBD. Pt oriented to person, partial time, disoriented to place and situation; pt not cognizant of posterior hip  precautions. Pt demonstrated need for mod A x 2 and increased time to complete bed mobility (supine > sitting, scooting to EOB), dep for scooting back in chair, min A x 2 for sit < > Stand t/f at EOB and mod A x 2 for SPT to chair from bed w/ FWW. Pt req'd skilled cues for seq, postural alignment (to stand upright) and further to advance AD w/ body mvmt. Pt dep for d/d socks and general LBD d/t inability to modify approach and adhere to precautions. OT will plan to issue AE in upcoming session and teach pt on use, purpose for increased I and safety given pt's cognition supports new learning. Pt able to complete hair groomimg w/ max A, hand hygiene w/ I after set up. Pt presents w/ weakness at BUEs. Pt will benefit from further IPOT POC and recommend SNF at d/c based on underlying impairments, fact pt lives alone and impact of impairments on fxn.     Time Calculation:   Time Calculation- OT     Row Name 09/19/20 1006 09/19/20 0858          Time Calculation- OT    OT Start Time  0811 -JY  --     OT Received On  09/19/20 -JY  --     OT Goal Re-Cert Due Date  09/29/20 -JY  --        Timed Charges    03635 - OT Self Care/Mgmt Minutes  --  8  -JY       User Key  (r) = Recorded By, (t) = Taken By, (c) = Cosigned By    Initials Name Provider Type    Belia Grier OT Occupational Therapist        Therapy Charges for Today     Code Description Service Date Service Provider Modifiers Qty    77632438153 HC OT SELF CARE/MGMT/TRAIN EA 15 MIN 9/19/2020 Belia Lynne OT GO 1    03178116143 HC OT EVAL MOD COMPLEXITY 3 9/19/2020 Belia Lynne OT GO 1               Belia Lynne OT  9/19/2020

## 2020-09-20 LAB
ANION GAP SERPL CALCULATED.3IONS-SCNC: 5 MMOL/L (ref 5–15)
BACTERIA SPEC AEROBE CULT: ABNORMAL
BUN SERPL-MCNC: 18 MG/DL (ref 8–23)
BUN/CREAT SERPL: 27.7 (ref 7–25)
CALCIUM SPEC-SCNC: 8.4 MG/DL (ref 8.2–9.6)
CHLORIDE SERPL-SCNC: 99 MMOL/L (ref 98–107)
CO2 SERPL-SCNC: 26 MMOL/L (ref 22–29)
CREAT SERPL-MCNC: 0.65 MG/DL (ref 0.57–1)
DEPRECATED RDW RBC AUTO: 47.6 FL (ref 37–54)
ERYTHROCYTE [DISTWIDTH] IN BLOOD BY AUTOMATED COUNT: 13.8 % (ref 12.3–15.4)
GFR SERPL CREATININE-BSD FRML MDRD: 85 ML/MIN/1.73
GLUCOSE SERPL-MCNC: 125 MG/DL (ref 65–99)
HCT VFR BLD AUTO: 29.9 % (ref 34–46.6)
HGB BLD-MCNC: 9.7 G/DL (ref 12–15.9)
MCH RBC QN AUTO: 30.6 PG (ref 26.6–33)
MCHC RBC AUTO-ENTMCNC: 32.4 G/DL (ref 31.5–35.7)
MCV RBC AUTO: 94.3 FL (ref 79–97)
PLATELET # BLD AUTO: 166 10*3/MM3 (ref 140–450)
PMV BLD AUTO: 10.2 FL (ref 6–12)
POTASSIUM SERPL-SCNC: 4 MMOL/L (ref 3.5–5.2)
RBC # BLD AUTO: 3.17 10*6/MM3 (ref 3.77–5.28)
SODIUM SERPL-SCNC: 130 MMOL/L (ref 136–145)
WBC # BLD AUTO: 11.15 10*3/MM3 (ref 3.4–10.8)

## 2020-09-20 PROCEDURE — 25010000003 POTASSIUM CHLORIDE 10 MEQ/100ML SOLUTION: Performed by: INTERNAL MEDICINE

## 2020-09-20 PROCEDURE — 97530 THERAPEUTIC ACTIVITIES: CPT

## 2020-09-20 PROCEDURE — 25010000002 ENOXAPARIN PER 10 MG: Performed by: INTERNAL MEDICINE

## 2020-09-20 PROCEDURE — 80048 BASIC METABOLIC PNL TOTAL CA: CPT | Performed by: INTERNAL MEDICINE

## 2020-09-20 PROCEDURE — 99225 PR SBSQ OBSERVATION CARE/DAY 25 MINUTES: CPT | Performed by: INTERNAL MEDICINE

## 2020-09-20 PROCEDURE — 97110 THERAPEUTIC EXERCISES: CPT

## 2020-09-20 PROCEDURE — 85027 COMPLETE CBC AUTOMATED: CPT | Performed by: INTERNAL MEDICINE

## 2020-09-20 RX ORDER — ACETAMINOPHEN 500 MG
500 TABLET ORAL EVERY 6 HOURS
Status: DISCONTINUED | OUTPATIENT
Start: 2020-09-20 | End: 2020-09-22 | Stop reason: HOSPADM

## 2020-09-20 RX ORDER — AMLODIPINE BESYLATE 2.5 MG/1
2.5 TABLET ORAL
Status: DISCONTINUED | OUTPATIENT
Start: 2020-09-20 | End: 2020-09-22 | Stop reason: HOSPADM

## 2020-09-20 RX ADMIN — DOCUSATE SODIUM 200 MG: 100 CAPSULE, LIQUID FILLED ORAL at 09:28

## 2020-09-20 RX ADMIN — POTASSIUM CHLORIDE 10 MEQ: 7.46 INJECTION, SOLUTION INTRAVENOUS at 02:45

## 2020-09-20 RX ADMIN — DONEPEZIL HYDROCHLORIDE 5 MG: 5 TABLET ORAL at 20:52

## 2020-09-20 RX ADMIN — BISACODYL 10 MG: 5 TABLET, COATED ORAL at 05:29

## 2020-09-20 RX ADMIN — POTASSIUM CHLORIDE 10 MEQ: 7.46 INJECTION, SOLUTION INTRAVENOUS at 00:52

## 2020-09-20 RX ADMIN — GABAPENTIN 100 MG: 100 CAPSULE ORAL at 20:52

## 2020-09-20 RX ADMIN — DOCUSATE SODIUM 200 MG: 100 CAPSULE, LIQUID FILLED ORAL at 20:52

## 2020-09-20 RX ADMIN — PANTOPRAZOLE SODIUM 40 MG: 40 TABLET, DELAYED RELEASE ORAL at 09:28

## 2020-09-20 RX ADMIN — ACETAMINOPHEN 650 MG: 325 TABLET, FILM COATED ORAL at 01:01

## 2020-09-20 RX ADMIN — ACETAMINOPHEN 500 MG: 500 TABLET, FILM COATED ORAL at 13:22

## 2020-09-20 RX ADMIN — POLYETHYLENE GLYCOL 3350 17 G: 17 POWDER, FOR SOLUTION ORAL at 09:27

## 2020-09-20 RX ADMIN — ACETAMINOPHEN 650 MG: 325 TABLET, FILM COATED ORAL at 09:28

## 2020-09-20 RX ADMIN — AMLODIPINE BESYLATE 2.5 MG: 2.5 TABLET ORAL at 09:27

## 2020-09-20 RX ADMIN — LEVOTHYROXINE SODIUM 100 MCG: 100 TABLET ORAL at 05:29

## 2020-09-20 RX ADMIN — ENOXAPARIN SODIUM 30 MG: 30 INJECTION SUBCUTANEOUS at 09:27

## 2020-09-20 RX ADMIN — ACETAMINOPHEN 500 MG: 500 TABLET, FILM COATED ORAL at 18:22

## 2020-09-20 RX ADMIN — OXYCODONE HYDROCHLORIDE AND ACETAMINOPHEN 2 TABLET: 7.5; 325 TABLET ORAL at 20:55

## 2020-09-20 RX ADMIN — SERTRALINE HYDROCHLORIDE 25 MG: 25 TABLET ORAL at 09:28

## 2020-09-20 RX ADMIN — ACETAMINOPHEN 650 MG: 325 TABLET, FILM COATED ORAL at 05:29

## 2020-09-20 NOTE — PROGRESS NOTES
"Orthopedic Daily Progress Note      CC: HEIDY overnight.    Pain well controlled  General: no fevers, chills  Abdomen: no nausea, vomiting, or diarrhea    No other complaints    Physical Exam:  I have reviewed the vital signs.  Temp:  [97.4 °F (36.3 °C)-98.1 °F (36.7 °C)] 97.4 °F (36.3 °C)  Heart Rate:  [71-75] 74  Resp:  [17-18] 18  BP: (151-177)/(63-78) 177/78    Objective:  Vital signs: (most recent): Blood pressure 177/78, pulse 74, temperature 97.4 °F (36.3 °C), temperature source Oral, resp. rate 18, height 160 cm (63\"), weight 46.3 kg (102 lb), SpO2 94 %, not currently breastfeeding.            General Appearance:    Alert, cooperative, no distress  Extremities: No clubbing, cyanosis, or edema to lower extremities  Pulses:  2+ in distal surgical extremity  Skin: Incision Clean/dry/intact      Results Review:    I have reviewed the labs, radiology results and diagnostic studies:Hgb 9.7    Results from last 7 days   Lab Units 09/20/20  0830   WBC 10*3/mm3 11.15*   HEMOGLOBIN g/dL 9.7*   PLATELETS 10*3/mm3 166     Results from last 7 days   Lab Units 09/20/20  0830   SODIUM mmol/L 130*   POTASSIUM mmol/L 4.0   CO2 mmol/L 26.0   CREATININE mg/dL 0.65   GLUCOSE mg/dL 125*       I have reviewed the medications.    Assessment/Problem List  POD# 2 Day Post-Op   S/p L hip hemiarthroplasty for femoral neck fracture    Plan  WBAT LLE with post hip precautions x 6 weeks  PT/OT  DVT ppx with Lovenox 40 mg sq daily and mechanical        Discharge Planning: I expect patient to be discharged to TBD in TBD days.    Sigifredo Mariee Jr., MD  09/20/20  09:54 EDT            "

## 2020-09-20 NOTE — PROGRESS NOTES
Nutrition Services    Patient Name:  Darline Reed  YOB: 1926  MRN: [id number]  Admit Date:  9/17/2020                      Clinical Nutrition     Nutrition Assessment  Reason for Visit:   Identified at risk by screening criteria, MST score 2+      Patient Name: Darline Reed  YOB: 1926  MRN: [id number]  Date of Encounter: 09/20/20 18:42 EDT  Admission date: 9/17/2020      Comments:  Pt not comfortable at this time to allow full exam. Apparent wt loss w muscle wasting. Pt unable to rpt intake hx. In house taking 25% of meals. Supplement ordered.       Nutrition Assessment       Hospital Problem List    Closed transcervical fracture of left femur (CMS/HCC)    Hypercholesteremia    Benign essential hypertension    At high risk for falls    COPD (chronic obstructive pulmonary disease) with chronic bronchitis (CMS/[facility])    Hypothyroidism (acquired)    Leukocytosis    Fall    Dementia (CMS/[facility])        Admission diagnosis    Additional applicable diagnosis/conditions/procedures this adm:    Applicable PMH/PSxH:     PMH: She  has a past medical history of Abnormal PFTs (2010), Arthritis, Asthma, Cachexia (CMS/HCC) (3/19/2019), COPD (chronic obstructive pulmonary disease) (CMS/[facility]), Dementia (CMS/[facility]), Diverticulitis, Diverticulosis of colon without hemorrhage (3/19/2019), Dyspnea, Dyspnea on exertion (3/19/2019), Falls frequently (3/19/2019), GERD (gastroesophageal reflux disease), Hoarseness (3/19/2019), Hypercholesteremia, Hypertension (6/22/2016), Hypothyroid, Intractable acute post-traumatic headache (3/19/2019), Intractable pain (6/19/2016), Migraine (3/19/2019), Osteoporosis, Positive PPD, Right knee meniscal tear (2004), Right knee sprain, Scoliosis, and Weight loss (6/19/2016).   PSxH: She  has a past surgical history that includes tonsillectomy and adenoidectomy; Cosmetic surgery; Tonsillectomy (1947); Cataract extraction (1999); and Blepharoplasty.  "          Reported/Observed/Food/Nutrition Related History:     Pt unable to give intake hx. Not comfortable for full physical exam. Allows will try suppl.      Anthropometrics     Height: 160 cm (63\")  Last filed wt: Weight: 46.3 kg (102 lb) (09/17/20 1807)       BMI: BMI (Calculated): 18.1  Underweight:<18.5kg/m2    Ideal Body Weight (IBW) (kg): 52.72  89% of IBW    Weight Change   UBW: from EMR appears 112 lb  Weight change:10 lb   % wt change 9%   Time frame of weight loss: 8 mo     Labs reviewed     Results from last 7 days   Lab Units 09/20/20  0830 09/19/20  0732 09/18/20  1017   GLUCOSE mg/dL 125* 102* 91   BUN mg/dL 18 23 18   CREATININE mg/dL 0.65 0.74 0.64   SODIUM mmol/L 130* 130* 134*   CHLORIDE mmol/L 99 96* 97*   POTASSIUM mmol/L 4.0 3.1* 3.5   MAGNESIUM mg/dL  --   --  2.1           Invalid input(s): PLAT          No results found for: HGBA1C      Medications reviewed   Pertinent:  Colace, Protonix, Miralax, Dulcolax, KCl        Nutrition Focused Physical Exam     Unable to perform exam due to: Potential for patient discomfort  Apparent wasting of legs & arms and likely upper body sites; however unable to access all sites for exam w/o pt discomfort      Current Nutrition Prescription     PO: Diet Regular  Supplement: Boost Plus 2x/da added per RD    Intake: 25% of 5 meals      Nutrition Diagnosis     9/19  Problem Unintended weight loss   Etiology Likely inadeq intake   Signs/Symptoms Per rpt loss of 10 lbs over 8 mo 9% of body wt, apparent muscle wasting and BMI now 18.1     9/19  Problem Inadequate oral intake   Etiology Weakness, dementia   Signs/Symptoms 25% intake of 5 meals recorded       Nutrition Intervention      Follow treatment progress, Care plan reviewed, Advise alternate selection, Menu provided, Supplement provided    Goal:   General: Nutrition support treatment  PO: Increase intake      Monitoring/Evaluation:   Per protocol, PO intake, Supplement intake, Pertinent labs, Weight, Skin " status, Symptoms          Ciera Pretty RD,  Time Spent: 30  min        Electronically signed by:  Ciera Pretty RD  09/20/20 18:42 EDT

## 2020-09-20 NOTE — THERAPY TREATMENT NOTE
Patient Name: Darline Reed  : 1926    MRN: [id number]                              Today's Date: 2020       Admit Date: 2020    Visit Dx:     ICD-10-CM ICD-9-CM   1. Closed transcervical fracture of left femur, initial encounter (CMS/HCC)  S72.032A 820.02     Patient Active Problem List   Diagnosis   • Weight loss   • Chronic bilateral thoracic back pain   • Chronic right-sided low back pain with right-sided sciatica   • Senile osteoporosis   • Scoliosis (and kyphoscoliosis), idiopathic   • Memory loss   • Hypercholesteremia   • Urge incontinence of urine   • Benign essential hypertension   • Constipation by delayed colonic transit   • At high risk for falls   • Mild major depression, single episode (CMS/HCC)   • COPD (chronic obstructive pulmonary disease) with chronic bronchitis (CMS/HCC)   • Gastroesophageal reflux disease without esophagitis   • Hypothyroidism (acquired)   • Macular degeneration (senile) of retina   • Closed transcervical fracture of left femur (CMS/[facility])   • Leukocytosis   • Fall   • Dementia (CMS/[facility])     Past Medical History:   Diagnosis Date   • Abnormal PFTs 2010    Restriction and obstruction on PFT/s   • Arthritis    • Asthma    • Cachexia (CMS/[facility]) 3/19/2019   • COPD (chronic obstructive pulmonary disease) (CMS/HCC)    • Dementia (CMS/HCC)    • Diverticulitis    • Diverticulosis of colon without hemorrhage 3/19/2019   • Dyspnea    • Dyspnea on exertion 3/19/2019   • Falls frequently 3/19/2019   • GERD (gastroesophageal reflux disease)    • Hoarseness 3/19/2019   • Hypercholesteremia    • Hypertension 2016   • Hypothyroid    • Intractable acute post-traumatic headache 3/19/2019   • Intractable pain 2016   • Migraine 3/19/2019   • Osteoporosis    • Positive PPD    • Right knee meniscal tear    • Right knee sprain     conservative   • Scoliosis    • Weight loss 2016     Past Surgical History:   Procedure Laterality Date   • BLEPHAROPLASTY     • CATARACT  EXTRACTION  1999   • COSMETIC SURGERY     • TONSILLECTOMY  1947   • TONSILLECTOMY AND ADENOIDECTOMY       General Information     Row Name 09/20/20 0850          Physical Therapy Time and Intention    Document Type  therapy note (daily note)  -     Mode of Treatment  individual therapy;physical therapy  -     Row Name 09/20/20 0850          General Information    Patient Profile Reviewed  yes  -ADRIEN     Existing Precautions/Restrictions  fall;left;hip, posterior;oxygen therapy device and L/min  -     Row Name 09/20/20 0850          Cognition    Orientation Status (Cognition)  oriented to;person;time  -     Row Name 09/20/20 0850          Safety Issues, Functional Mobility    Safety Issues Affecting Function (Mobility)  insight into deficits/self-awareness;steps too close to assistive device;sequencing abilities;safety precautions follow-through/compliance;safety precaution awareness;awareness of need for assistance  -     Impairments Affecting Function (Mobility)  balance;endurance/activity tolerance;pain;postural/trunk control;range of motion (ROM);strength  -       User Key  (r) = Recorded By, (t) = Taken By, (c) = Cosigned By    Initials Name Provider Type    ADRIEN Jesus Lopez, PT Physical Therapist        Mobility     Row Name 09/20/20 0850          Bed Mobility    Bed Mobility  supine-sit;scooting/bridging  -     Scooting/Bridging [place] (Bed Mobility)  verbal cues;moderate assist (50% patient effort)  -ADRIEN     Supine-Sit [place] (Bed Mobility)  moderate assist (50% patient effort);verbal cues  -     Assistive Device (Bed Mobility)  bed rails;draw sheet;head of bed elevated  -     Comment (Bed Mobility)  Mod A for LE management off of EOB and trunk control into sitting  -     Row Name 09/20/20 0850          Transfers    Comment (Transfers)  Verbal cues for safe hand placement during standing/sitting, moving L LE out for comfort prior to sitting, and maintaining posterior hip precautions;  stand-pivot from bed-BSC requiring mod A  -ADRIEN     Row Name 09/20/20 0850          Bed-Chair Transfer    Bed-Chair [place] (Transfers)  moderate assist (50% patient effort);verbal cues  -     Assistive Device (Bed-Chair Transfers)  walker, front-wheeled  -ADRIEN     Row Name 09/20/20 0850          Sit-Stand Transfer    Sit-Stand [place] (Transfers)  verbal cues;moderate assist (50% patient effort)  -     Assistive Device (Sit-Stand Transfers)  walker, front-wheeled  -ADRIEN     Row Name 09/20/20 0850          Gait/Stairs (Locomotion)    [place] Level (Gait)  verbal cues;moderate assist (50% patient effort)  -     Assistive Device (Gait)  walker, front-wheeled  -ADRIEN     Distance in Feet (Gait)  5 feet  -     Deviations/Abnormal Patterns (Gait)  bilateral deviations;gait speed decreased;rainer decreased;stride length decreased;antalgic  -ADRIEN     Bilateral Gait Deviations  forward flexed posture  -ADRIEN     Left Sided Gait Deviations  heel strike decreased;weight shift ability decreased  -     [place] Level (Stairs)  not tested  -ADRIEN     Comment (Gait/Stairs)  Pt ambulated 5 feet from BSC to chair with step to pattern and decreased speed. Verbal cues for maintaining body within walker, upright posture, increasing step length, and WB through LEs. Gait limited by pain and fatigue.  -     Row Name 09/20/20 0850          Mobility    Extremity Weight-bearing Status  left lower extremity  -     Left Lower Extremity (Weight-bearing Status)  weight-bearing as tolerated (WBAT)  -       User Key  (r) = Recorded By, (t) = Taken By, (c) = Cosigned By    Initials Name Provider Type    ADRIEN Jesus Lopez, PT Physical Therapist        Obj/Interventions     Row Name 09/20/20 0850          Motor Skills    Therapeutic Exercise  hip;knee;ankle  -     Row Name 09/20/20 0850          Hip (Therapeutic Exercise)    Hip (Therapeutic Exercise)  isometric exercises;AROM (active range of motion)  -     Hip AROM (Therapeutic  Exercise)  left;aBduction;aDduction;10 repetitions  -     Hip Isometrics (Therapeutic Exercise)  gluteal sets;10 repetitions  -[facility] Name 09/20/20 0850          Knee (Therapeutic Exercise)    Knee (Therapeutic Exercise)  AROM (active range of motion);isometric exercises;AAROM (active assistive range of motion)  -     Knee AROM (Therapeutic Exercise)  left;LAQ (long arc quad);heel slides;SLR (straight leg raise)  -     Knee Isometrics (Therapeutic Exercise)  quad sets;10 repetitions  -[facility] Name 09/20/20 0850          Ankle (Therapeutic Exercise)    Ankle (Therapeutic Exercise)  AROM (active range of motion)  -     Ankle AROM (Therapeutic Exercise)  bilateral;dorsiflexion;plantarflexion;10 repetitions  -       User Key  (r) = Recorded By, (t) = Taken By, (c) = Cosigned By    Initials Name Provider Type    Jesus Hamilton, PT Physical Therapist        Goals/Plan    No documentation.       Clinical Impression     [facility] Name 09/20/20 0850          Pain    Additional Documentation  Pain Scale: Numbers Pre/Post-Treatment (Group)  -ADRIEN     Row Name 09/20/20 0850          Pain Scale: Numbers Pre/Post-Treatment    Pretreatment Pain Rating  8/10  -     Posttreatment Pain Rating  9/10  -     Pain Location - Side  Left  -     Pain Location - Orientation  posterior  -     Pain Location  hip  -     Pain Intervention(s)  Ambulation/increased activity;Repositioned  -[facility] Name 09/20/20 0850          Therapy Assessment/Plan (PT)    Rehab Potential (PT)  good, to achieve stated therapy goals  -     Criteria for Skilled Interventions Met (PT)  yes;skilled treatment is necessary  -[facility] Name 09/20/20 0850          Positioning and Restraints    Pre-Treatment Position  in bed  -     Post Treatment Position  chair  -     In Chair  notified nsg;reclined;call light within reach;encouraged to call for assist;exit alarm on;with family/caregiver;on mechanical lift sling;legs elevated  -       User Key   (r) = Recorded By, (t) = Taken By, (c) = Cosigned By    Initials Name Provider Type    Jesus Hamilton PT Physical Therapist        Outcome Measures     Row Name 09/20/20 0850          How much help from another person do you currently need...    Turning from your back to your side while in flat bed without using bedrails?  2  -ADRIEN     Moving from lying on back to sitting on the side of a flat bed without bedrails?  2  -ADRIEN     Moving to and from a bed to a chair (including a wheelchair)?  2  -ADRIEN     Standing up from a chair using your arms (e.g., wheelchair, bedside chair)?  2  -ADRIEN     Climbing 3-5 steps with a railing?  1  -ADRIEN     To walk in hospital room?  2  -ADRIEN     AM-PAC 6 Clicks Score (PT)  11  -ADRIEN     Row Name 09/20/20 0850          Functional Assessment    Outcome Measure Options  AM-PAC 6 Clicks Basic Mobility (PT)  -ADRIEN       User Key  (r) = Recorded By, (t) = Taken By, (c) = Cosigned By    Initials Name Provider Type    Jesus Hamilton PT Physical Therapist        Physical Therapy Education                 Title: PT OT SLP Therapies (In Progress)     Topic: Physical Therapy (In Progress)     Point: Mobility training (In Progress)     Learning Progress Summary           Patient Acceptance, E,D, NR by ADRIEN at 9/20/2020 0850    Comment: Educated on safe sequencing with bed mobility, ambulatory/toilet transfers, and gait. Reviewedd HEP and posterior hip precautions.    Acceptance, D,E, NR by ADRIEN at 9/19/2020 0825    Comment: Educated on safe sequencing with bed mobility, ambulatory transfers, and gait. Reviewed HEP and posterior hip precautions.    Acceptance, E, VU by  at 9/19/2020 0245   Family Acceptance, E,D, NR by ADRIEN at 9/20/2020 0850    Comment: Educated on safe sequencing with bed mobility, ambulatory/toilet transfers, and gait. Reviewedd HEP and posterior hip precautions.    Acceptance, D,E, NR by ADRIEN at 9/19/2020 0825    Comment: Educated on safe sequencing with bed mobility, ambulatory transfers, and  gait. Reviewed HEP and posterior hip precautions.                   Point: Home exercise program (In Progress)     Learning Progress Summary           Patient Acceptance, E,D, NR by ADRIEN at 9/20/2020 0850    Comment: Educated on safe sequencing with bed mobility, ambulatory/toilet transfers, and gait. Reviewedd HEP and posterior hip precautions.    Acceptance, D,E, NR by ADRIEN at 9/19/2020 0825    Comment: Educated on safe sequencing with bed mobility, ambulatory transfers, and gait. Reviewed HEP and posterior hip precautions.    Acceptance, E, VU by LG at 9/19/2020 0245   Family Acceptance, E,D, NR by ADRIEN at 9/20/2020 0850    Comment: Educated on safe sequencing with bed mobility, ambulatory/toilet transfers, and gait. Reviewedd HEP and posterior hip precautions.    Acceptance, D,E, NR by ADRIEN at 9/19/2020 0825    Comment: Educated on safe sequencing with bed mobility, ambulatory transfers, and gait. Reviewed HEP and posterior hip precautions.                   Point: Body mechanics (In Progress)     Learning Progress Summary           Patient Acceptance, E,D, NR by ADRIEN at 9/20/2020 0850    Comment: Educated on safe sequencing with bed mobility, ambulatory/toilet transfers, and gait. Reviewedd HEP and posterior hip precautions.    Acceptance, D,E, NR by ADRIEN at 9/19/2020 0825    Comment: Educated on safe sequencing with bed mobility, ambulatory transfers, and gait. Reviewed HEP and posterior hip precautions.    Acceptance, E, VU by LG at 9/19/2020 0245   Family Acceptance, E,D, NR by ADRIEN at 9/20/2020 0850    Comment: Educated on safe sequencing with bed mobility, ambulatory/toilet transfers, and gait. Reviewedd HEP and posterior hip precautions.    Acceptance, D,E, NR by ADRIEN at 9/19/2020 0825    Comment: Educated on safe sequencing with bed mobility, ambulatory transfers, and gait. Reviewed HEP and posterior hip precautions.                   Point: Precautions (In Progress)     Learning Progress Summary           Patient  Acceptance, E,D, NR by ADRIEN at 9/20/2020 0850    Comment: Educated on safe sequencing with bed mobility, ambulatory/toilet transfers, and gait. Reviewedd HEP and posterior hip precautions.    Acceptance, D,E, NR by ADRIEN at 9/19/2020 0825    Comment: Educated on safe sequencing with bed mobility, ambulatory transfers, and gait. Reviewed HEP and posterior hip precautions.    Acceptance, E, VU by  at 9/19/2020 0245   Family Acceptance, E,D, NR by ADRIEN at 9/20/2020 0850    Comment: Educated on safe sequencing with bed mobility, ambulatory/toilet transfers, and gait. Reviewedd HEP and posterior hip precautions.    Acceptance, D,E, NR by ADRIEN at 9/19/2020 0825    Comment: Educated on safe sequencing with bed mobility, ambulatory transfers, and gait. Reviewed HEP and posterior hip precautions.                               User Key     Initials Effective Dates Name Provider Type Discipline     11/16/18 -  Govind Chilel RN Registered Nurse Nurse     09/10/19 -  Jesus Lopez, PT Physical Therapist PT              PT Recommendation and Plan  Planned Therapy Interventions (PT): balance training, bed mobility training, gait training, home exercise program, patient/family education, strengthening, transfer training  Plan of Care Reviewed With: patient, son  Progress: improving  Outcome Summary: Pt increased ambulation distance to 5 feet using RW and mod A for balance and AD management. Gait limited by pain and fatigue. Bed mobility, STS, and stand-pivot transfer from bed-BSC performed with mod A. Reviewed HEP and posterior hip precautions. Will continue to progress strength and mobility as able.     Time Calculation:   PT Charges     Row Name 09/20/20 0850             Time Calculation    Start Time  0850  -      PT Received On  09/20/20  -      PT Goal Re-Cert Due Date  09/29/20  -         Time Calculation- PT    Total Timed Code Minutes- PT  23 minute(s)  -         Timed Charges    95471 - PT Therapeutic Exercise  Minutes  10  -ADRIEN      61280 - Gait Training Minutes   6  -ADRIEN      25707 - PT Therapeutic Activity Minutes  7  -        User Key  (r) = Recorded By, (t) = Taken By, (c) = Cosigned By    Initials Name Provider Type    Jesus Hamilton, PT Physical Therapist        Therapy Charges for Today     Code Description Service Date Service Provider Modifiers Qty    44283189024  PT THER PROC EA 15 MIN 9/19/2020 Jesus Lopez, PT GP 1    [id number]  PT EVAL MOD COMPLEXITY 3 9/19/2020 Jesus Lopez, PT GP 1    [id number]  PT THER PROC EA 15 MIN 9/20/2020 Jesus Lopez, PT GP 1    [id number]  PT THERAPEUTIC ACT EA 15 MIN 9/20/2020 Jesus Lopez, PT GP 1          PT G-Codes  Outcome Measure Options: AM-PAC 6 Clicks Basic Mobility (PT)  AM-PAC 6 Clicks Score (PT): 11  AM-PAC 6 Clicks Score (OT): 13    Jesus Lopez PT  9/20/2020

## 2020-09-20 NOTE — PROGRESS NOTES
"    [facility] Medicine Services  PROGRESS NOTE    Patient Name: Darline Reed  : 1926  MRN: [id number]    Date of Admission: 2020  Primary Care Physician: Sandra Adkins MD    Subjective   Subjective     CC:  Fall w femur fx at home.     HPI:  \"Feels awful!\"  Aches all over.  Cannot ttell me if tylenol helps.  Had a bit of confusion after a nap yesterday but is baseline now.     Review of Systems - limited by dementia  No fever  No dyspnea,   No chest pain    r Objective   Objective     Vital Signs:   Temp:  [97.4 °F (36.3 °C)-98.1 °F (36.7 °C)] 97.4 °F (36.3 °C)  Heart Rate:  [71-75] 74  Resp:  [17-18] 18  BP: (151-177)/(63-78) 177/78        Physical Exam:  Gen:  WD/WN thin woman in NAD, up in chair, dtr present.    Neuro: alert and oriented, clear speech, follows commands, grossly nonfocal  HEENT:  NC/AT PERRL, OP benign  Neck:  Supple, no LAD  Heart RRR no murmur, rub, or gallop  Lungs CTA nonlabored  Abd:  Soft, nontender, no rebound or guarding, pos BS  Extrem:  No c/c.  No ankle edema. Some edema at L hip   Skin warm and dry         Results Reviewed:  Results from last 7 days   Lab Units 20  0830 20  0732 20  1017   WBC 10*3/mm3 11.15* 14.47* 12.94*   HEMOGLOBIN g/dL 9.7* 9.5* 11.5*   HEMATOCRIT % 29.9* 29.9* 35.7   PLATELETS 10*3/mm3 166 176 208   INR   --   --  1.04     Results from last 7 days   Lab Units 20  0732 20  1017 20  1950   SODIUM mmol/L 130* 134* 133*   POTASSIUM mmol/L 3.1* 3.5 3.6   CHLORIDE mmol/L 96* 97* 97*   CO2 mmol/L 26.0 29.0 26.0   BUN mg/dL 23 18 16   CREATININE mg/dL 0.74 0.64 0.71   GLUCOSE mg/dL 102* 91 113*   CALCIUM mg/dL 8.3 8.6 9.0     Estimated Creatinine Clearance: 31.4 mL/min (by C-G formula based on SCr of 0.74 mg/dL).    Microbiology Results Abnormal     Procedure Component Value - Date/Time    Urine Culture - Urine, Urine, Clean Catch [[id number]]  (Abnormal)  (Susceptibility) Collected: 20 " 0137    Lab Status: Final result Specimen: Urine, Clean Catch Updated: 09/20/20 0344     Urine Culture >100,000 CFU/mL Escherichia coli    Susceptibility      Escherichia coli     YUNIER     Ampicillin Intermediate     Ampicillin + Sulbactam Susceptible     Cefazolin Susceptible     Cefepime Susceptible     Ceftazidime Susceptible     Ceftriaxone Susceptible     Gentamicin Susceptible     Levofloxacin Susceptible     Nitrofurantoin Susceptible     Piperacillin + Tazobactam Susceptible     Tetracycline Susceptible     Trimethoprim + Sulfamethoxazole Susceptible                    COVID PRE-OP / PRE-PROCEDURE SCREENING ORDER (NO ISOLATION) - Swab, Nasal Cavity [[id number]]  (Normal) Collected: 09/18/20 0122    Lab Status: Final result Specimen: Swab from Nasal Cavity Updated: 09/18/20 0239    Narrative:      The following orders were created for panel order COVID PRE-OP / PRE-PROCEDURE SCREENING ORDER (NO ISOLATION) - Swab, Nasal Cavity.  Procedure                               Abnormality         Status                     ---------                               -----------         ------                     COVID-19, ABBOTT IN-HOUS...[[id number]]  Normal              Final result                 Please view results for these tests on the individual orders.    COVID-19, ABBOTT IN-HOUSE,NP Swab (NO TRANSPORT MEDIA) 2 HR TAT - Swab, Nasal Cavity [[id number]]  (Normal) Collected: 09/18/20 0122    Lab Status: Final result Specimen: Swab from Nasal Cavity Updated: 09/18/20 0239     COVID19 Not Detected    Narrative:      Fact sheet for providers: https://www.fda.gov/media/333915/download     Fact sheet for patients: https://www.fda.gov/media/816479/download          Imaging Results (Last 24 Hours)     ** No results found for the last 24 hours. **               I have reviewed the medications:  Scheduled Meds:docusate sodium, 200 mg, Oral, BID  donepezil, 5 mg, Oral, Nightly  enoxaparin, 30 mg, Subcutaneous, Daily  gabapentin,  100 mg, Oral, Nightly  levothyroxine, 100 mcg, Oral, Q AM  pantoprazole, 40 mg, Oral, Daily  polyethylene glycol, 17 g, Oral, Daily  sertraline, 25 mg, Oral, Daily  sodium chloride, 10 mL, Intravenous, Q12H      Continuous Infusions:lactated ringers, 9 mL/hr, Last Rate: 9 mL/hr (09/18/20 1220)  ropivacaine (NAROPIN) 0.2% peripheral nerve cath (moog), 8 mL/hr, Last Rate: 8 mL/hr (09/18/20 1512)  sodium chloride, 50 mL/hr, Last Rate: 50 mL/hr (09/19/20 1234)      PRN Meds:.•  acetaminophen **OR** acetaminophen **OR** acetaminophen  •  acetaminophen **OR** acetaminophen  •  bisacodyl  •  bisacodyl  •  diphenhydrAMINE **OR** diphenhydrAMINE  •  docusate sodium  •  HYDROmorphone **AND** naloxone  •  magnesium hydroxide  •  naloxone  •  ondansetron **OR** ondansetron  •  ondansetron **OR** ondansetron  •  oxyCODONE  •  oxyCODONE-acetaminophen  •  potassium chloride **OR** potassium chloride **OR** potassium chloride  •  senna-docusate sodium  •  [COMPLETED] Insert peripheral IV **AND** sodium chloride  •  sodium chloride    Assessment/Plan   Assessment & Plan     Active Hospital Problems    Diagnosis  POA   • **Closed transcervical fracture of left femur (CMS/[facility]) [S72.032A]  Yes   • Leukocytosis [D72.829]  Yes   • Fall [W19.XXXA]  Yes   • Dementia (CMS/HCC) [F03.90]  Yes   • At high risk for falls [Z91.81]  Not Applicable   • Benign essential hypertension [I10]  Yes   • COPD (chronic obstructive pulmonary disease) with chronic bronchitis (CMS/[facility]) [J44.9]  Yes   • Hypothyroidism (acquired) [E03.9]  Yes   • Hypercholesteremia [E78.00]  Yes      Resolved Hospital Problems   No resolved problems to display.        Brief Hospital Course to date:  Darline Reed is a 89 y.o. female pretty independent at home, with  past medical history of hypertension, COPD, hypothyroidism, dementia. Fell at home, unwitnessed, left hip x-ray did show displaced left femoral neck fracture.      Displaced left femoral neck fracture  Mechanical  fall   -POD 2  from hemiarthroplasty, Dr. Hill  - pain control  - mobilize  - bowel regimen   - will schedule tylenol for achiness     UA mildly abnl - doubt this is a UTI but will watch  Mild leukocytosis, follow    Mild HTN - starting baby dose norvasc      Dementia, mild, lives at home  -continue routine Aricept, Zoloft  -fall precautions  -case mgt consult     Hypothyroidism  -tsh nl       GERD -continue routine Prilosec (sub Protonix)         DVT prophylaxis:  Mechanical        Disposition: I expect the patient to be discharged tbd, expect SNF     CODE STATUS:   Code Status and Medical Interventions:   Ordered at: 09/17/20 2054     Limited Support to NOT Include:    Intubation     Level Of Support Discussed With:    Patient    Health Care Surrogate     Code Status:    CPR     Medical Interventions (Level of Support Prior to Arrest):    Limited     Comments:    no intubation

## 2020-09-20 NOTE — PLAN OF CARE
Problem: Adult Inpatient Plan of Care  Goal: Plan of Care Review  Flowsheets (Taken 9/20/2020 5989)  Progress: improving  Plan of Care Reviewed With:   patient   son  Outcome Summary: Pt increased ambulation distance to 5 feet using RW and mod A for balance and AD management. Gait limited by pain and fatigue. Bed mobility, STS, and stand-pivot transfer from bed-BSC performed with mod A. Reviewed HEP and posterior hip precautions. Will continue to progress strength and mobility as able.

## 2020-09-20 NOTE — PLAN OF CARE
Problem: Adult Inpatient Plan of Care  Goal: Plan of Care Review  Outcome: Ongoing, Progressing  Flowsheets (Taken 9/20/2020 0502)  Progress: improving  Plan of Care Reviewed With: patient  Outcome Summary: Pt remains confused. VSS. On tele, NSR. Pt removed prineo. Covaderm in place. Pt was very anxious, had labored breathing, and was in severe pain. Called APRN, PRN 0.25mg dilaudid administered. After medication, pt has been resting comfortably with controlled pain. K+ replacement completed, awaiting redraw. Will continue to monitor,

## 2020-09-20 NOTE — PLAN OF CARE
Patient is A&O during the day.  Had 2 very large BM and less bladder pressure.  Good UOP noted.  Patient pulled IV and refused to have another inserted.  Patient has removed 3 coverderm on LEFT hip and has picked off almost all of the prineo dressing.  Incision is approximated.  BP slightly elevated with new medications being put in place.

## 2020-09-21 LAB
ANION GAP SERPL CALCULATED.3IONS-SCNC: 9 MMOL/L (ref 5–15)
BUN SERPL-MCNC: 12 MG/DL (ref 8–23)
BUN/CREAT SERPL: 23.1 (ref 7–25)
CALCIUM SPEC-SCNC: 8.3 MG/DL (ref 8.2–9.6)
CHLORIDE SERPL-SCNC: 98 MMOL/L (ref 98–107)
CO2 SERPL-SCNC: 26 MMOL/L (ref 22–29)
CREAT SERPL-MCNC: 0.52 MG/DL (ref 0.57–1)
GFR SERPL CREATININE-BSD FRML MDRD: 110 ML/MIN/1.73
GLUCOSE SERPL-MCNC: 94 MG/DL (ref 65–99)
POTASSIUM SERPL-SCNC: 3.4 MMOL/L (ref 3.5–5.2)
POTASSIUM SERPL-SCNC: 4.1 MMOL/L (ref 3.5–5.2)
SARS-COV-2 RDRP RESP QL NAA+PROBE: NOT DETECTED
SODIUM SERPL-SCNC: 133 MMOL/L (ref 136–145)

## 2020-09-21 PROCEDURE — 97110 THERAPEUTIC EXERCISES: CPT

## 2020-09-21 PROCEDURE — 80048 BASIC METABOLIC PNL TOTAL CA: CPT | Performed by: INTERNAL MEDICINE

## 2020-09-21 PROCEDURE — P9612 CATHETERIZE FOR URINE SPEC: HCPCS

## 2020-09-21 PROCEDURE — 25010000002 ENOXAPARIN PER 10 MG: Performed by: INTERNAL MEDICINE

## 2020-09-21 PROCEDURE — 84132 ASSAY OF SERUM POTASSIUM: CPT | Performed by: INTERNAL MEDICINE

## 2020-09-21 PROCEDURE — 97116 GAIT TRAINING THERAPY: CPT

## 2020-09-21 PROCEDURE — 99225 PR SBSQ OBSERVATION CARE/DAY 25 MINUTES: CPT | Performed by: INTERNAL MEDICINE

## 2020-09-21 PROCEDURE — 87635 SARS-COV-2 COVID-19 AMP PRB: CPT | Performed by: INTERNAL MEDICINE

## 2020-09-21 RX ADMIN — SERTRALINE HYDROCHLORIDE 25 MG: 25 TABLET ORAL at 08:43

## 2020-09-21 RX ADMIN — ACETAMINOPHEN 500 MG: 500 TABLET, FILM COATED ORAL at 11:56

## 2020-09-21 RX ADMIN — ACETAMINOPHEN 500 MG: 500 TABLET, FILM COATED ORAL at 05:25

## 2020-09-21 RX ADMIN — POTASSIUM CHLORIDE 40 MEQ: 10 CAPSULE, COATED, EXTENDED RELEASE ORAL at 15:50

## 2020-09-21 RX ADMIN — ACETAMINOPHEN 500 MG: 500 TABLET, FILM COATED ORAL at 01:35

## 2020-09-21 RX ADMIN — ACETAMINOPHEN 500 MG: 500 TABLET, FILM COATED ORAL at 18:18

## 2020-09-21 RX ADMIN — LEVOTHYROXINE SODIUM 100 MCG: 100 TABLET ORAL at 05:24

## 2020-09-21 RX ADMIN — OXYCODONE HYDROCHLORIDE 5 MG: 5 TABLET ORAL at 05:25

## 2020-09-21 RX ADMIN — POTASSIUM CHLORIDE 40 MEQ: 10 CAPSULE, COATED, EXTENDED RELEASE ORAL at 11:56

## 2020-09-21 RX ADMIN — GABAPENTIN 100 MG: 100 CAPSULE ORAL at 19:35

## 2020-09-21 RX ADMIN — PANTOPRAZOLE SODIUM 40 MG: 40 TABLET, DELAYED RELEASE ORAL at 08:42

## 2020-09-21 RX ADMIN — ENOXAPARIN SODIUM 30 MG: 30 INJECTION SUBCUTANEOUS at 08:43

## 2020-09-21 RX ADMIN — AMLODIPINE BESYLATE 2.5 MG: 2.5 TABLET ORAL at 08:43

## 2020-09-21 RX ADMIN — DOCUSATE SODIUM 200 MG: 100 CAPSULE, LIQUID FILLED ORAL at 19:35

## 2020-09-21 RX ADMIN — DONEPEZIL HYDROCHLORIDE 5 MG: 5 TABLET ORAL at 19:35

## 2020-09-21 NOTE — PROGRESS NOTES
[facility] Medicine Services  PROGRESS NOTE    Patient Name: Darline Reed  : 1926  MRN: [id number]    Date of Admission: 2020  Primary Care Physician: Sandra Adkins MD    Subjective   Subjective     CC:  Fall w femur fx at home.     HPI: Seen hobbling across room with PT, sits in chair, endorses pain.  Dtr at bedside thinks she is doing fine on scheduled tylenol.  Cognitiely she has been mostly clear.     Review of Systems   Eyes: Positive for itching.    - limited by dementia  No fever  No dyspnea,   No chest pain  Per staff, she required I/o cath at times; is voiding small amounts with PVR of 400+     r Objective   Objective     Vital Signs:   Temp:  [97.9 °F (36.6 °C)-98.2 °F (36.8 °C)] 97.9 °F (36.6 °C)  Heart Rate:  [70-84] 84  Resp:  [16-18] 16  BP: (149-170)/(75-85) 170/85        Physical Exam:  Gen:  WD/WN thin woman grimacing a bit as she walks, sits, gait belt and PT with her.   Neuro: alert and oriented, clear speech, follows commands, grossly nonfocal, bright and alert.   HEENT:  NC/AT PERRL, OP benign  Neck:  Supple, no LAD  Heart RRR no murmur, rub, or gallop  Lungs CTA nonlabored  Abd:  Soft, nontender, no rebound or guarding, pos BS  Extrem:  No c/c.  No ankle edema.   Skin warm and dry         Results Reviewed:  Results from last 7 days   Lab Units 20  0820  0732 20  1017   WBC 10*3/mm3 11.15* 14.47* 12.94*   HEMOGLOBIN g/dL 9.7* 9.5* 11.5*   HEMATOCRIT % 29.9* 29.9* 35.7   PLATELETS 10*3/mm3 166 176 208   INR   --   --  1.04     Results from last 7 days   Lab Units 20  0830 20  0732 20  1017   SODIUM mmol/L 130* 130* 134*   POTASSIUM mmol/L 4.0 3.1* 3.5   CHLORIDE mmol/L 99 96* 97*   CO2 mmol/L 26.0 26.0 29.0   BUN mg/dL 18 23 18   CREATININE mg/dL 0.65 0.74 0.64   GLUCOSE mg/dL 125* 102* 91   CALCIUM mg/dL 8.4 8.3 8.6     Estimated Creatinine Clearance: 31.4 mL/min (by C-G formula based on SCr of 0.65  mg/dL).    Microbiology Results Abnormal     Procedure Component Value - Date/Time    Urine Culture - Urine, Urine, Clean Catch [[id number]]  (Abnormal)  (Susceptibility) Collected: 09/18/20 0137    Lab Status: Final result Specimen: Urine, Clean Catch Updated: 09/20/20 0344     Urine Culture >100,000 CFU/mL Escherichia coli    Susceptibility      Escherichia coli     YUNIER     Ampicillin Intermediate     Ampicillin + Sulbactam Susceptible     Cefazolin Susceptible     Cefepime Susceptible     Ceftazidime Susceptible     Ceftriaxone Susceptible     Gentamicin Susceptible     Levofloxacin Susceptible     Nitrofurantoin Susceptible     Piperacillin + Tazobactam Susceptible     Tetracycline Susceptible     Trimethoprim + Sulfamethoxazole Susceptible                    COVID PRE-OP / PRE-PROCEDURE SCREENING ORDER (NO ISOLATION) - Swab, Nasal Cavity [[id number]]  (Normal) Collected: 09/18/20 0122    Lab Status: Final result Specimen: Swab from Nasal Cavity Updated: 09/18/20 0239    Narrative:      The following orders were created for panel order COVID PRE-OP / PRE-PROCEDURE SCREENING ORDER (NO ISOLATION) - Swab, Nasal Cavity.  Procedure                               Abnormality         Status                     ---------                               -----------         ------                     COVID-19, ABBOTT IN-HOUS...[[id number]]  Normal              Final result                 Please view results for these tests on the individual orders.    COVID-19, ABBOTT IN-HOUSE,NP Swab (NO TRANSPORT MEDIA) 2 HR TAT - Swab, Nasal Cavity [[id number]]  (Normal) Collected: 09/18/20 0122    Lab Status: Final result Specimen: Swab from Nasal Cavity Updated: 09/18/20 0239     COVID19 Not Detected    Narrative:      Fact sheet for providers: https://www.fda.gov/media/007822/download     Fact sheet for patients: https://www.fda.gov/media/947124/download          Imaging Results (Last 24 Hours)     ** No results found for the last 24  hours. **               I have reviewed the medications:  Scheduled Meds:acetaminophen, 500 mg, Oral, Q6H  amLODIPine, 2.5 mg, Oral, Q24H  docusate sodium, 200 mg, Oral, BID  donepezil, 5 mg, Oral, Nightly  enoxaparin, 30 mg, Subcutaneous, Daily  gabapentin, 100 mg, Oral, Nightly  levothyroxine, 100 mcg, Oral, Q AM  pantoprazole, 40 mg, Oral, Daily  polyethylene glycol, 17 g, Oral, Daily  sertraline, 25 mg, Oral, Daily  sodium chloride, 10 mL, Intravenous, Q12H      Continuous Infusions:lactated ringers, 9 mL/hr, Last Rate: 9 mL/hr (09/18/20 1220)  ropivacaine (NAROPIN) 0.2% peripheral nerve cath (moog), 8 mL/hr, Last Rate: 8 mL/hr (09/18/20 1512)  sodium chloride, 50 mL/hr, Last Rate: 50 mL/hr (09/19/20 1234)      PRN Meds:.•  acetaminophen **OR** acetaminophen  •  bisacodyl  •  bisacodyl  •  diphenhydrAMINE **OR** diphenhydrAMINE  •  docusate sodium  •  HYDROmorphone **AND** naloxone  •  magnesium hydroxide  •  naloxone  •  ondansetron **OR** ondansetron  •  ondansetron **OR** ondansetron  •  oxyCODONE  •  oxyCODONE-acetaminophen  •  potassium chloride **OR** potassium chloride **OR** potassium chloride  •  senna-docusate sodium  •  [COMPLETED] Insert peripheral IV **AND** sodium chloride  •  sodium chloride    Assessment/Plan   Assessment & Plan     Active Hospital Problems    Diagnosis  POA   • **Closed transcervical fracture of left femur (CMS/HCC) [S72.032A]  Yes   • Leukocytosis [D72.829]  Yes   • Fall [W19.XXXA]  Yes   • Dementia (CMS/HCC) [F03.90]  Yes   • At high risk for falls [Z91.81]  Not Applicable   • Benign essential hypertension [I10]  Yes   • COPD (chronic obstructive pulmonary disease) with chronic bronchitis (CMS/HCC) [J44.9]  Yes   • Hypothyroidism (acquired) [E03.9]  Yes   • Hypercholesteremia [E78.00]  Yes      Resolved Hospital Problems   No resolved problems to display.        Brief Hospital Course to date:  Darline Reed is a 89 y.o. female pretty independent at home, with  past medical  history of hypertension, COPD, hypothyroidism, dementia. Fell at home, unwitnessed, left hip x-ray did show displaced left femoral neck fracture.      Displaced left femoral neck fracture  Mechanical fall   -POD 2  from hemiarthroplasty, Dr. Hill  - pain control  - mobilize  - bowel regimen   - will schedule tylenol for achiness     UA mildly abnl - doubt this is a UTI but will watch  Mild leukocytosis, follow    Hyponatremia - stable, watch; meds reveiwed.  She is on zoloft.    Mild HTN - started baby dose norvasc      Dementia, mild, lives at home  -continue routine Aricept, Zoloft  -fall precautions  -case mgt consult     Hypothyroidism  -tsh nl       GERD -continue routine Prilosec (sub Protonix)         DVT prophylaxis:  Mechanical        Disposition: I expect the patient to be discharged tbd, expect SNF , check COVID    CODE STATUS:   Code Status and Medical Interventions:   Ordered at: 09/17/20 2054     Limited Support to NOT Include:    Intubation     Level Of Support Discussed With:    Patient    Health Care Surrogate     Code Status:    CPR     Medical Interventions (Level of Support Prior to Arrest):    Limited     Comments:    no intubation

## 2020-09-21 NOTE — PLAN OF CARE
Problem: Adult Inpatient Plan of Care  Goal: Plan of Care Review  Flowsheets (Taken 9/21/2020 1050)  Progress: improving  Plan of Care Reviewed With:   patient   daughter  Outcome Summary: Pt increased ambulation distance to 20 feet using RW and min A for balance and AD management. Gait limited by fatigue. Bed mobility and STS performed with min A. Reviewed HEP and posterior hip precautions. Will continue to progress strength and mobility as able.

## 2020-09-21 NOTE — THERAPY TREATMENT NOTE
Patient Name: Darline Reed  : 1926    MRN: [id number]                              Today's Date: 2020       Admit Date: 2020    Visit Dx:     ICD-10-CM ICD-9-CM   1. Closed transcervical fracture of left femur, initial encounter (CMS/HCC)  S72.032A 820.02     Patient Active Problem List   Diagnosis   • Weight loss   • Chronic bilateral thoracic back pain   • Chronic right-sided low back pain with right-sided sciatica   • Senile osteoporosis   • Scoliosis (and kyphoscoliosis), idiopathic   • Memory loss   • Hypercholesteremia   • Urge incontinence of urine   • Benign essential hypertension   • Constipation by delayed colonic transit   • At high risk for falls   • Mild major depression, single episode (CMS/HCC)   • COPD (chronic obstructive pulmonary disease) with chronic bronchitis (CMS/HCC)   • Gastroesophageal reflux disease without esophagitis   • Hypothyroidism (acquired)   • Macular degeneration (senile) of retina   • Closed transcervical fracture of left femur (CMS/[facility])   • Leukocytosis   • Fall   • Dementia (CMS/[facility])     Past Medical History:   Diagnosis Date   • Abnormal PFTs 2010    Restriction and obstruction on PFT/s   • Arthritis    • Asthma    • Cachexia (CMS/[facility]) 3/19/2019   • COPD (chronic obstructive pulmonary disease) (CMS/HCC)    • Dementia (CMS/HCC)    • Diverticulitis    • Diverticulosis of colon without hemorrhage 3/19/2019   • Dyspnea    • Dyspnea on exertion 3/19/2019   • Falls frequently 3/19/2019   • GERD (gastroesophageal reflux disease)    • Hoarseness 3/19/2019   • Hypercholesteremia    • Hypertension 2016   • Hypothyroid    • Intractable acute post-traumatic headache 3/19/2019   • Intractable pain 2016   • Migraine 3/19/2019   • Osteoporosis    • Positive PPD    • Right knee meniscal tear    • Right knee sprain     conservative   • Scoliosis    • Weight loss 2016     Past Surgical History:   Procedure Laterality Date   • BLEPHAROPLASTY     • CATARACT  EXTRACTION  1999   • COSMETIC SURGERY     • HIP HEMIARTHROPLASTY Left 9/18/2020    Procedure: HIP HEMIARTHROPLASTY LEFT;  Surgeon: Clarence Hill MD;  Location: [facility];  Service: Orthopedics;  Laterality: Left;   • TONSILLECTOMY  1947   • TONSILLECTOMY AND ADENOIDECTOMY       General Information     Row Name 09/21/20 1050          Physical Therapy Time and Intention    Document Type  therapy note (daily note)  -     Mode of Treatment  individual therapy;physical therapy  -     Row Name 09/21/20 1050          General Information    Patient Profile Reviewed  yes  -ADRIEN     Existing Precautions/Restrictions  fall;left;hip, posterior;oxygen therapy device and L/min  -ADRIEN     Row Name 09/21/20 1050          Cognition    Orientation Status (Cognition)  oriented x 3  -ADRIEN     Row Name 09/21/20 1050          Safety Issues, Functional Mobility    Safety Issues Affecting Function (Mobility)  awareness of need for assistance;safety precaution awareness;safety precautions follow-through/compliance;insight into deficits/self-awareness  -     Impairments Affecting Function (Mobility)  balance;endurance/activity tolerance;pain;postural/trunk control;range of motion (ROM);strength  -       User Key  (r) = Recorded By, (t) = Taken By, (c) = Cosigned By    Initials Name Provider Type    ADRIEN Jesus Lopez PT Physical Therapist        Mobility     Row Name 09/21/20 1050          Bed Mobility    Bed Mobility  supine-sit;scooting/bridging  -     Scooting/Bridging [place] (Bed Mobility)  verbal cues;moderate assist (50% patient effort)  -ADRIEN     Supine-Sit [place] (Bed Mobility)  moderate assist (50% patient effort);verbal cues  -     Assistive Device (Bed Mobility)  bed rails;draw sheet;head of bed elevated  -     Comment (Bed Mobility)  Mod A for LE management off of EOB and use of UEs to push trunk into sitting  -     Row Name 09/21/20 1050          Transfers    Comment (Transfers)  Verbal cues for use of  UEs to push off of bed to stand, reach back for chair prior to sitting, and moving L LE out for comfort prior to sitting  -     Row Name 09/21/20 1050          Sit-Stand Transfer    Sit-Stand [place] (Transfers)  verbal cues;minimum assist (75% patient effort)  -     Assistive Device (Sit-Stand Transfers)  walker, front-wheeled  -     Row Name 09/21/20 1050          Gait/Stairs (Locomotion)    [place] Level (Gait)  verbal cues;minimum assist (75% patient effort)  -     Assistive Device (Gait)  walker, front-wheeled  -     Distance in Feet (Gait)  20 feet  -     Deviations/Abnormal Patterns (Gait)  bilateral deviations;gait speed decreased;rainer decreased;stride length decreased;antalgic  -     Bilateral Gait Deviations  forward flexed posture  -     Left Sided Gait Deviations  heel strike decreased;weight shift ability decreased  -     [place] Level (Stairs)  not tested  -     Comment (Gait/Stairs)  Pt ambulated with step to pattern and improved speed. Verbal cues for maintaining upright posture, increasing WB through LEs, step length, and decreasing WB through UEs. Gait limited by fatigue.  -     Row Name 09/21/20 1050          Mobility    Extremity Weight-bearing Status  left lower extremity  -     Left Lower Extremity (Weight-bearing Status)  weight-bearing as tolerated (WBAT)  -       User Key  (r) = Recorded By, (t) = Taken By, (c) = Cosigned By    Initials Name Provider Type    ADRIEN Jesus Lopez, PT Physical Therapist        Obj/Interventions     Row Name 09/21/20 1050          Motor Skills    Therapeutic Exercise  hip;knee;ankle  -[facility] Name 09/21/20 1050          Hip (Therapeutic Exercise)    Hip (Therapeutic Exercise)  AROM (active range of motion)  -     Hip AROM (Therapeutic Exercise)  bilateral;aBduction;aDduction;10 repetitions  -     Hip Isometrics (Therapeutic Exercise)  gluteal sets;10 repetitions  -     Row Name 09/21/20 1050          Knee  (Therapeutic Exercise)    Knee (Therapeutic Exercise)  AROM (active range of motion);isometric exercises  -     Knee AROM (Therapeutic Exercise)  bilateral;SLR (straight leg raise);LAQ (long arc quad);heel slides  -     Knee Isometrics (Therapeutic Exercise)  bilateral;quad sets;10 repetitions  -ADRIEN     Row Name 09/21/20 1050          Ankle (Therapeutic Exercise)    Ankle (Therapeutic Exercise)  AROM (active range of motion)  -     Ankle AROM (Therapeutic Exercise)  bilateral;dorsiflexion;plantarflexion;10 repetitions  -       User Key  (r) = Recorded By, (t) = Taken By, (c) = Cosigned By    Initials Name Provider Type    Jesus Hamilton, PT Physical Therapist        Goals/Plan    No documentation.       Clinical Impression     Row Name 09/21/20 1050          Pain    Additional Documentation  Pain Scale: Numbers Pre/Post-Treatment (Group)  -ADRIEN     Row Name 09/21/20 1050          Pain Scale: Numbers Pre/Post-Treatment    Pretreatment Pain Rating  1/10  -     Posttreatment Pain Rating  4/10  -     Pain Location - Side  Left  -     Pain Location - Orientation  generalized  -     Pain Location  hip  -     Pain Intervention(s)  Repositioned;Ambulation/increased activity  -ADRIEN     Row Name 09/21/20 1050          Therapy Assessment/Plan (PT)    Rehab Potential (PT)  good, to achieve stated therapy goals  -     Criteria for Skilled Interventions Met (PT)  yes;skilled treatment is necessary  -[facility] Name 09/21/20 1050          Positioning and Restraints    Pre-Treatment Position  in bed  -     Post Treatment Position  chair  -ADRIEN     In Chair  notified nsg;reclined;call light within reach;encouraged to call for assist;exit alarm on;with family/caregiver;waffle cushion;legs elevated  -       User Key  (r) = Recorded By, (t) = Taken By, (c) = Cosigned By    Initials Name Provider Type    Jesus Hamilton, PT Physical Therapist        Outcome Measures     Row Name 09/21/20 1050          How much help from  another person do you currently need...    Turning from your back to your side while in flat bed without using bedrails?  3  -ADRIEN     Moving from lying on back to sitting on the side of a flat bed without bedrails?  2  -ADRIEN     Moving to and from a bed to a chair (including a wheelchair)?  2  -ADRIEN     Standing up from a chair using your arms (e.g., wheelchair, bedside chair)?  2  -ADRIEN     Climbing 3-5 steps with a railing?  1  -ADRIEN     To walk in hospital room?  3  -ADRIEN     AM-PAC 6 Clicks Score (PT)  13  -ADRIEN     Row Name 09/21/20 1050          Functional Assessment    Outcome Measure Options  AM-PAC 6 Clicks Basic Mobility (PT)  -ADRIEN       User Key  (r) = Recorded By, (t) = Taken By, (c) = Cosigned By    Initials Name Provider Type    Jesus Hamilton, PT Physical Therapist        Physical Therapy Education                 Title: PT OT SLP Therapies (In Progress)     Topic: Physical Therapy (Done)     Point: Mobility training (Done)     Learning Progress Summary           Patient Acceptance, E,D, VU by ADRIEN at 9/21/2020 1050    Comment: Educated on safe sequencing with bed mobility, ambulatory transfers, and gait. Reviewed HEP and posterior hip precautions.    Acceptance, E,D, NR by ADRIEN at 9/20/2020 0850    Comment: Educated on safe sequencing with bed mobility, ambulatory/toilet transfers, and gait. Reviewedd HEP and posterior hip precautions.    Acceptance, D,E, NR by ADRIEN at 9/19/2020 0825    Comment: Educated on safe sequencing with bed mobility, ambulatory transfers, and gait. Reviewed HEP and posterior hip precautions.    Acceptance, E, VU by  at 9/19/2020 0245   Family Acceptance, E,D, VU by ADRIEN at 9/21/2020 1050    Comment: Educated on safe sequencing with bed mobility, ambulatory transfers, and gait. Reviewed HEP and posterior hip precautions.    Acceptance, E,D, NR by ADRIEN at 9/20/2020 0850    Comment: Educated on safe sequencing with bed mobility, ambulatory/toilet transfers, and gait. Reviewedd HEP and posterior hip  precautions.    Acceptance, D,E, NR by ADRIEN at 9/19/2020 0825    Comment: Educated on safe sequencing with bed mobility, ambulatory transfers, and gait. Reviewed HEP and posterior hip precautions.                   Point: Home exercise program (Done)     Learning Progress Summary           Patient Acceptance, E,D, VU by ADRIEN at 9/21/2020 1050    Comment: Educated on safe sequencing with bed mobility, ambulatory transfers, and gait. Reviewed HEP and posterior hip precautions.    Acceptance, E,D, NR by ADRIEN at 9/20/2020 0850    Comment: Educated on safe sequencing with bed mobility, ambulatory/toilet transfers, and gait. Reviewedd HEP and posterior hip precautions.    Acceptance, D,E, NR by ADRIEN at 9/19/2020 0825    Comment: Educated on safe sequencing with bed mobility, ambulatory transfers, and gait. Reviewed HEP and posterior hip precautions.    Acceptance, E, VU by HENRY at 9/19/2020 0245   Family Acceptance, E,D, VU by ADRIEN at 9/21/2020 1050    Comment: Educated on safe sequencing with bed mobility, ambulatory transfers, and gait. Reviewed HEP and posterior hip precautions.    Acceptance, E,D, NR by ADRIEN at 9/20/2020 0850    Comment: Educated on safe sequencing with bed mobility, ambulatory/toilet transfers, and gait. Reviewedd HEP and posterior hip precautions.    Acceptance, D,E, NR by ADRIEN at 9/19/2020 0825    Comment: Educated on safe sequencing with bed mobility, ambulatory transfers, and gait. Reviewed HEP and posterior hip precautions.                   Point: Body mechanics (Done)     Learning Progress Summary           Patient Acceptance, E,D, VU by ADRIEN at 9/21/2020 1050    Comment: Educated on safe sequencing with bed mobility, ambulatory transfers, and gait. Reviewed HEP and posterior hip precautions.    Acceptance, E,D, NR by ADRIEN at 9/20/2020 0850    Comment: Educated on safe sequencing with bed mobility, ambulatory/toilet transfers, and gait. Reviewedd HEP and posterior hip precautions.    Acceptance, D,E, NR by ADRIEN at  9/19/2020 0825    Comment: Educated on safe sequencing with bed mobility, ambulatory transfers, and gait. Reviewed HEP and posterior hip precautions.    Acceptance, E, VU by LG at 9/19/2020 0245   Family Acceptance, E,D, VU by ADRIEN at 9/21/2020 1050    Comment: Educated on safe sequencing with bed mobility, ambulatory transfers, and gait. Reviewed HEP and posterior hip precautions.    Acceptance, E,D, NR by ADRIEN at 9/20/2020 0850    Comment: Educated on safe sequencing with bed mobility, ambulatory/toilet transfers, and gait. Reviewedd HEP and posterior hip precautions.    Acceptance, D,E, NR by ADRIEN at 9/19/2020 0825    Comment: Educated on safe sequencing with bed mobility, ambulatory transfers, and gait. Reviewed HEP and posterior hip precautions.                   Point: Precautions (Done)     Learning Progress Summary           Patient Acceptance, E,D, VU by ADRIEN at 9/21/2020 1050    Comment: Educated on safe sequencing with bed mobility, ambulatory transfers, and gait. Reviewed HEP and posterior hip precautions.    Acceptance, E,D, NR by ADRIEN at 9/20/2020 0850    Comment: Educated on safe sequencing with bed mobility, ambulatory/toilet transfers, and gait. Reviewedd HEP and posterior hip precautions.    Acceptance, D,E, NR by ADRIEN at 9/19/2020 0825    Comment: Educated on safe sequencing with bed mobility, ambulatory transfers, and gait. Reviewed HEP and posterior hip precautions.    Acceptance, E, VU by HENRY at 9/19/2020 0245   Family Acceptance, E,D, VU by ADRIEN at 9/21/2020 1050    Comment: Educated on safe sequencing with bed mobility, ambulatory transfers, and gait. Reviewed HEP and posterior hip precautions.    Acceptance, E,D, NR by ADRIEN at 9/20/2020 0850    Comment: Educated on safe sequencing with bed mobility, ambulatory/toilet transfers, and gait. Reviewedd HEP and posterior hip precautions.    Acceptance, D,E, NR by ADRIEN at 9/19/2020 0825    Comment: Educated on safe sequencing with bed mobility, ambulatory transfers,  and gait. Reviewed HEP and posterior hip precautions.                               User Key     Initials Effective Dates Name Provider Type Discipline    LG 11/16/18 -  Govind Chilel RN Registered Nurse Nurse     09/10/19 -  Jesus Lopez PT Physical Therapist PT              PT Recommendation and Plan  Planned Therapy Interventions (PT): balance training, bed mobility training, gait training, home exercise program, patient/family education, strengthening, transfer training  Plan of Care Reviewed With: patient, daughter  Progress: improving  Outcome Summary: Pt increased ambulation distance to 20 feet using RW and min A for balance and AD management. Gait limited by fatigue. Bed mobility and STS performed with min A. Reviewed HEP and posterior hip precautions. Will continue to progress strength and mobility as able.     Time Calculation:   PT Charges     Row Name 09/21/20 1050             Time Calculation    Start Time  1050  -ADRIEN      PT Received On  09/21/20  -      PT Goal Re-Cert Due Date  09/29/20  -         Time Calculation- PT    Total Timed Code Minutes- PT  25 minute(s)  -ADRIEN         Timed Charges    35376 - PT Therapeutic Exercise Minutes  10  -ADRIEN      26745 - Gait Training Minutes   15  -ADRIEN        User Key  (r) = Recorded By, (t) = Taken By, (c) = Cosigned By    Initials Name Provider Type    ADRIEN Jesus Lopez, PT Physical Therapist        Therapy Charges for Today     Code Description Service Date Service Provider Modifiers Qty    63961376721 HC PT THER PROC EA 15 MIN 9/20/2020 Jesus Lopez, PT GP 1    91936234929 HC PT THERAPEUTIC ACT EA 15 MIN 9/20/2020 Jesus Lopez, PT GP 1    54310918134 HC PT THER PROC EA 15 MIN 9/21/2020 Jesus Lopez, PT GP 1    23612062471 HC GAIT TRAINING EA 15 MIN 9/21/2020 Jesus Lopez, PT GP 1          PT G-Codes  Outcome Measure Options: AM-PAC 6 Clicks Basic Mobility (PT)  AM-PAC 6 Clicks Score (PT): 13  AM-PAC 6 Clicks Score (OT): 13    Jesus Lopez PT  9/21/2020

## 2020-09-21 NOTE — DISCHARGE SUMMARY
[facility] Medicine Services  DISCHARGE SUMMARY    Patient Name: Darline Reed  : 1926  MRN: [id number]    Date of Admission: 2020  6:02 PM  Date of Discharge:  2020  Primary Care Physician: Sandra Adkins MD    Consults     Date and Time Order Name Status Description    2020 0031 Inpatient Orthopedic Surgery Consult            Hospital Course     Presenting Problem:   Closed transcervical fracture of left femur, initial encounter (CMS/[facility]) [S72.032A]  Closed transcervical fracture of left femur, initial encounter (CMS/[facility]) [S72.032A]  Closed transcervical fracture of left femur, initial encounter (CMS/[facility]) [S72.032A]    Active Hospital Problems    Diagnosis  POA   • **Closed transcervical fracture of left femur (CMS/[facility]) [S72.032A]  Yes   • Leukocytosis [D72.829]  Yes   • Fall [W19.XXXA]  Yes   • Dementia (CMS/[facility]) [F03.90]  Yes   • At high risk for falls [Z91.81]  Not Applicable   • Benign essential hypertension [I10]  Yes   • COPD (chronic obstructive pulmonary disease) with chronic bronchitis (CMS/[facility]) [J44.9]  Yes   • Hypothyroidism (acquired) [E03.9]  Yes   • Hypercholesteremia [E78.00]  Yes      Resolved Hospital Problems   No resolved problems to display.          Hospital Course:  Darline Reed is a 89 y.o. female with history of HTN, HLD, COPD, hypothyroidism, GERD and dementia.  She lives alone and is independent with ADLs and usually walks with a cane.  Her family checks on her frequently.  On day of admission, she was home alone for an hour and fell, no LOC reported.  She relates that she tripped while making her dinner.  Xrays confirmed a displaced left hip fracture.    She was admitted, underwent hemiarthroplasty on  with no immediate complication, and had a straightforward recovery in the immediate postop period.  There was transient confusion.  She had urinary retention, often voiding 100ml with retention of 500 ml, and received occasional  "in/out cathing for this.      For pain control, she was started on scheduled acetaminaphen, which seems to be working well.  Tramadol is added for PRN use.     For DVT prophylaxis, she is continued on enoxaparin at time of discharge.    Discharge Follow Up Recommendations for outpatient labs/diagnostics:   *Orthopedist to follow up.     * Bladder scans, in/out cathing as needed    Day of Discharge     HPI:   \"Not so good.\"  Generally is worried about her health, has some discomfort at hip.  Son at bedside thinks she is doing remarkably well.  No confusion.      Review of Systems  Gen- No fevers, chills.  Complains of poor appetite which son says is chronic.   CV- No chest pain, palpitations  Resp- No cough, dyspnea  GI- No N/V/D, abd pain        Vital Signs:   Temp:  [97.5 °F (36.4 °C)-98.1 °F (36.7 °C)] 98.1 °F (36.7 °C)  Heart Rate:  [80-86] 84  Resp:  [16-17] 17  BP: (168-189)/(82-92) 186/91     Physical Exam:  Gen:  Thin elderly woman, bright and alert in bed, NAD, son present.  Generally a bit dissatisfied with her health.    Neuro: alert and oriented, clear speech, follows commands, grossly nonfocal  HEENT:  NC/AT PERRL, OP benign  Neck:  Supple, no LAD  Heart RRR no murmur, rub, or gallop  Lungs CTA nonlabored  Abd:  Soft, nontender, no rebound or guarding, pos BS  Extrem:  No c/c/e, bruise at surgical site.       Pertinent  and/or Most Recent Results     Results from last 7 days   Lab Units 09/21/20 2022 09/21/20  0749 09/20/20  0830 09/19/20  0732 09/18/20  1017 09/17/20  1950   WBC 10*3/mm3  --   --  11.15* 14.47* 12.94* 12.33*   HEMOGLOBIN g/dL  --   --  9.7* 9.5* 11.5* 12.4   HEMATOCRIT %  --   --  29.9* 29.9* 35.7 37.8   PLATELETS 10*3/mm3  --   --  166 176 208 218   SODIUM mmol/L  --  133* 130* 130* 134* 133*   POTASSIUM mmol/L 4.1 3.4* 4.0 3.1* 3.5 3.6   CHLORIDE mmol/L  --  98 99 96* 97* 97*   CO2 mmol/L  --  26.0 26.0 26.0 29.0 26.0   BUN mg/dL  --  12 18 23 18 16   CREATININE mg/dL  --  0.52* 0.65 " 0.74 0.64 0.71   GLUCOSE mg/dL  --  94 125* 102* 91 113*   CALCIUM mg/dL  --  8.3 8.4 8.3 8.6 9.0     Results from last 7 days   Lab Units 09/18/20  1017   PROTIME Seconds 13.3   INR  1.04   APTT seconds 32.5           Invalid input(s): TG, LDLCALC, LDLREALC  Results from last 7 days   Lab Units 09/18/20  1017   TSH uIU/mL 2.880       Brief Urine Lab Results  (Last result in the past 365 days)      Color   Clarity   Blood   Leuk Est   Nitrite   Protein   CREAT   Urine HCG        09/18/20 0137 Yellow Cloudy Negative Trace Negative Trace               Microbiology Results Abnormal     Procedure Component Value - Date/Time    COVID PRE-OP / PRE-PROCEDURE SCREENING ORDER (NO ISOLATION) - Swab, Nasal Cavity [[id number]]  (Normal) Collected: 09/21/20 1303    Lab Status: Final result Specimen: Swab from Nasal Cavity Updated: 09/21/20 1346    Narrative:      The following orders were created for panel order COVID PRE-OP / PRE-PROCEDURE SCREENING ORDER (NO ISOLATION) - Swab, Nasal Cavity.  Procedure                               Abnormality         Status                     ---------                               -----------         ------                     COVID-19, ABBOTT IN-HOUS...[[id number]]  Normal              Final result                 Please view results for these tests on the individual orders.    COVID-19, ABBOTT IN-HOUSE,NP Swab (NO TRANSPORT MEDIA) 2 HR TAT - Swab, Nasal Cavity [[id number]]  (Normal) Collected: 09/21/20 1303    Lab Status: Final result Specimen: Swab from Nasal Cavity Updated: 09/21/20 1346     COVID19 Not Detected    Narrative:      Fact sheet for providers: https://www.fda.gov/media/753493/download     Fact sheet for patients: https://www.fda.gov/media/878333/download    Urine Culture - Urine, Urine, Clean Catch [[id number]]  (Abnormal)  (Susceptibility) Collected: 09/18/20 0137    Lab Status: Final result Specimen: Urine, Clean Catch Updated: 09/20/20 0344     Urine Culture >100,000 CFU/mL  Escherichia coli    Susceptibility      Escherichia coli     YUNIER     Ampicillin Intermediate     Ampicillin + Sulbactam Susceptible     Cefazolin Susceptible     Cefepime Susceptible     Ceftazidime Susceptible     Ceftriaxone Susceptible     Gentamicin Susceptible     Levofloxacin Susceptible     Nitrofurantoin Susceptible     Piperacillin + Tazobactam Susceptible     Tetracycline Susceptible     Trimethoprim + Sulfamethoxazole Susceptible                    COVID PRE-OP / PRE-PROCEDURE SCREENING ORDER (NO ISOLATION) - Swab, Nasal Cavity [[id number]]  (Normal) Collected: 09/18/20 0122    Lab Status: Final result Specimen: Swab from Nasal Cavity Updated: 09/18/20 0239    Narrative:      The following orders were created for panel order COVID PRE-OP / PRE-PROCEDURE SCREENING ORDER (NO ISOLATION) - Swab, Nasal Cavity.  Procedure                               Abnormality         Status                     ---------                               -----------         ------                     COVID-19, ABBOTT IN-HOUS...[[id number]]  Normal              Final result                 Please view results for these tests on the individual orders.    COVID-19, ABBOTT IN-HOUSE,NP Swab (NO TRANSPORT MEDIA) 2 HR TAT - Swab, Nasal Cavity [[id number]]  (Normal) Collected: 09/18/20 0122    Lab Status: Final result Specimen: Swab from Nasal Cavity Updated: 09/18/20 0239     COVID19 Not Detected    Narrative:      Fact sheet for providers: https://www.fda.gov/media/271013/download     Fact sheet for patients: https://www.fda.gov/media/843042/download          Imaging Results (All)     Procedure Component Value Units Date/Time    XR Hip With or Without Pelvis 2 - 3 View Left [[id number]] Collected: 09/18/20 1527     Updated: 09/21/20 1245    Narrative:      EXAMINATION: XR HIP W OR WO PELVIS, 2-3 VIEW, LEFT-09/18/2020:      INDICATION: Post-Op Hip Arthroplasty; S72.032A-Displaced midcervical  fracture of left femur, initial encounter for  closed fracture.      COMPARISON: 09/17/2020.     FINDINGS: Status post left total hip arthroplasty with expected  postsurgical changes in the adjacent soft tissues in grossly anatomic  alignment.           Impression:      Status post left total hip arthroplasty in grossly anatomic  alignment.     D:  09/18/2020  E:  09/18/2020     This report was finalized on 9/21/2020 12:42 PM by Dr. Soto Null.       XR Chest 1 View [[id number]] Collected: 09/18/20 0031     Updated: 09/18/20 0033    Narrative:      CR Chest 1 Vw    INDICATION:   Emphysema. Presurgical evaluation for femoral fracture.     COMPARISON:    4/20/2018    FINDINGS:  Single portable AP view(s) of the chest.        The heart and mediastinal contours are normal. The lungs are clear. Emphysematous changes are again seen. No pneumothorax or pleural effusion.       Impression:      No acute cardiopulmonary findings.    Signer Name: Ricco Botello MD   Signed: 9/18/2020 12:31 AM   Workstation Name: [id number]    [facility]    XR Hip With or Without Pelvis 2 - 3 View Left [[id number]] Collected: 09/17/20 1910     Updated: 09/17/20 1913    Narrative:      CR Hip Uni Comp Min 2 Vws LT    INDICATION:   Tripped and fell at home today. Left hip pain    COMPARISON:   None available.    FINDINGS:  AP and frog-leg lateral view(s) of the left hip.  Displaced left femoral neck fracture with one half femoral neck width lateral and proximal migration of the distal fracture fragment. No underlying pathologic lesion. Degenerative changes lower lumbar  spine. Soft tissues unremarkable. No significant arthropathy of the hip.        Impression:      Displaced left femoral neck fracture.    Signer Name: HARJIT Mays MD   Signed: 9/17/2020 7:10 PM   Workstation Name: [email or web address] [place]    [facility]            Discharge Details        Discharge Medications      New Medications      Instructions Start Date   acetaminophen 500 MG  tablet  Commonly known as: TYLENOL   500 mg, Oral, Every 6 Hours      amLODIPine 2.5 MG tablet  Commonly known as: NORVASC   2.5 mg, Oral, Every 24 Hours Scheduled      enoxaparin 30 MG/0.3ML solution syringe  Commonly known as: LOVENOX   30 mg, Subcutaneous, Daily      magnesium hydroxide 2400 MG/10ML suspension suspension  Commonly known as: MILK OF MAGNESIA   10 mL, Oral, Daily PRN      traMADol 50 MG tablet  Commonly known as: ULTRAM   50 mg, Oral, Every 6 Hours PRN         Continue These Medications      Instructions Start Date   Cyanocobalamin 5000 MCG tablet dispersible   take 1 tablet daily      docusate sodium 250 MG capsule  Commonly known as: COLACE   2 capsules, Oral, Daily      donepezil 5 MG tablet  Commonly known as: ARICEPT   TAKE 1 TABLET EVERY NIGHT      gabapentin 100 MG capsule  Commonly known as: NEURONTIN   100 mg, Oral, Nightly      levothyroxine 100 MCG tablet  Commonly known as: SYNTHROID, LEVOTHROID   TAKE 1 TABLET DAILY      Magnesium Citrate 100 MG tablet   take 1 tablet every evening      omeprazole 40 MG capsule  Commonly known as: priLOSEC   40 mg, Oral, Daily      polyethylene glycol packet  Commonly known as: MIRALAX   17 g, Oral, Daily      PreserVision AREDS 2+Multi Vit capsule   Take one capsule by oral route once a day      Selenium 200 MCG tablet   1 tablet, Oral, Daily      sertraline 50 MG tablet  Commonly known as: ZOLOFT   TAKE ONE-HALF (1/2) TABLET DAILY      solifenacin 10 MG tablet  Commonly known as: VESICARE   10 mg, Oral, Daily      Vitamin D3 50 MCG (2000 UT) capsule   1 capsule by mouth  daily         Stop These Medications    meloxicam 7.5 MG tablet  Commonly known as: MOBIC            Allergies   Allergen Reactions   • Codeine    • Penicillins          Discharge Disposition:  To SNF for rehab   Rehab Facility or Unit (DC - External)    Diet:  Hospital:  Diet Order   Procedures   • Diet Regular       Activity:  PT, WBAT.   Activity Instructions     Left lower  extremity weight bearing as tolerated                  CODE STATUS:    Code Status and Medical Interventions:   Ordered at: 09/17/20 2054     Limited Support to NOT Include:    Intubation     Level Of Support Discussed With:    Patient    Health Care Surrogate     Code Status:    CPR     Medical Interventions (Level of Support Prior to Arrest):    Limited     Comments:    no intubation       Future Appointments   Date Time Provider Department Center   10/14/2020 12:45 PM Sandra Adkins MD [facility]     Dr Mariee in 2 weeks  Additional Instructions for the Follow-ups that You Need to Schedule     Discharge Follow-up with Specialty: dr mariee; 2 Weeks   As directed      Specialty: dr mariee    Follow Up: 2 Weeks               Time Spent on Discharge:  I spent  45  minutes on this discharge activity which included: face-to-face encounter with the patient, reviewing the data in the system, coordination of the care with the nursing staff as well as consultants, documentation, and entering orders.

## 2020-09-21 NOTE — PROGRESS NOTES
Continued Stay Note  [facility]     Patient Name: Darline Reed  MRN: [id number]  Today's Date: 9/21/2020    Admit Date: 9/17/2020    Discharge Plan     Row Name 09/21/20 1249       Plan    Plan  [facility]rianna Tenriism [facility]    Provided Post Acute Provider List?  Yes    Post Acute Provider List  Nursing Home    Provided Post Acute Provider Quality & Resource List?  Yes    Post Acute Provider Quality and Resource List  Nursing Home    Delivered To  Patient;Support Person    Method of Delivery  In person    Patient/Family in Agreement with Plan  yes    Final Discharge Disposition Code  03 - skilled nursing facility (SNF)    Final Note  Ms. Reed has a skilled bed at [facility] tomorrow, Tuesday, 9/22/20, if medically ready.  Insurance authorization is pending, but will be received likely by tomorrow.  A repeat COVID has been ordered.  Daughter, Padilla Mcintyre, is at the bedside and is agreeable to DC plan.    [facility] ambulance is scheduled for Tuesday at 12 noon.  PCS form on the chart.  Please call report to Adrianna Welsh at ph [phone].    Please have a copy of the DC summary, AVS and any hard scripts in the DC packet.  Thank you.        Discharge Codes    No documentation.       Expected Discharge Date and Time     Expected Discharge Date Expected Discharge Time    Sep 23, 2020             Nikki Ballesteros RN

## 2020-09-21 NOTE — PROGRESS NOTES
"Orthopedic Daily Progress Note      CC: HEIDY overnight.    Pain well controlled with p.o. meds  General: no fevers, chills  Abdomen: no nausea, vomiting, or diarrhea    No other complaints    Physical Exam:  I have reviewed the vital signs.  Temp:  [97.9 °F (36.6 °C)-98.2 °F (36.8 °C)] 97.9 °F (36.6 °C)  Heart Rate:  [70-84] 84  Resp:  [16-18] 16  BP: (149-170)/(75-85) 170/85    Objective:  Vital signs: (most recent): Blood pressure 170/85, pulse 84, temperature 97.9 °F (36.6 °C), temperature source Oral, resp. rate 16, height 160 cm (63\"), weight 46.3 kg (102 lb), SpO2 99 %, not currently breastfeeding.            General Appearance:    Alert, cooperative, no distress  Extremities: No clubbing, cyanosis, or edema to lower extremities  Pulses:  2+ in distal surgical extremity  Skin: Incision Clean/dry/intact      Results Review:    I have reviewed the labs, radiology results and diagnostic studies    Results from last 7 days   Lab Units 09/20/20  0830   WBC 10*3/mm3 11.15*   HEMOGLOBIN g/dL 9.7*   PLATELETS 10*3/mm3 166     Results from last 7 days   Lab Units 09/20/20  0830   SODIUM mmol/L 130*   POTASSIUM mmol/L 4.0   CO2 mmol/L 26.0   CREATININE mg/dL 0.65   GLUCOSE mg/dL 125*       I have reviewed the medications.    Assessment/Problem List  POD# 3 Day Post-Op   S/p L hip hemiarthroplasty for femoral neck fracture    Plan  WBAT LLE with post hip precautions x 6 weeks  PT/OT  DVT ppx with Lovenox 40 mg sq daily and mechanical        Discharge Planning: I expect patient to be discharged to TBD in TBD days.    Sigifredo Mariee Jr., MD  09/21/20  07:39 EDT            "

## 2020-09-21 NOTE — PLAN OF CARE
Pt laying in bed resting quietly at shift change.  VSS.  Incision site open to air.  Neurovascular checks WDL.  Pt c/o pain rated a 10 last evening as well as this morning.  Prn pain medication was administered.  Pt stated pain was much better.  Pt was bladder scanned throughout the night and over 500ml's were reported each time with pt being unable to urinate.  Pt was then in and out cath.  Pt also has several liquid bm's.  Pt is sleeping quietly this am.  Will continue to monitor.

## 2020-09-21 NOTE — PLAN OF CARE
Goal Outcome Evaluation:  Plan of Care Reviewed With: patient  Progress: improving  Outcome Summary: Patient has been able to ambulate with PT 20 ft. and has been able to stand and pivot throughout the day with assist of 1. remains alert and oriented X4. VSS. Has had two Bms today. I&O cathed once this morning. Aprox 193 ml present when bladder scanned at 1600. D/C tomorrow to [facility] at 1200 via [facility] ambulance

## 2020-09-22 VITALS
TEMPERATURE: 98.1 F | OXYGEN SATURATION: 90 % | RESPIRATION RATE: 17 BRPM | DIASTOLIC BLOOD PRESSURE: 91 MMHG | SYSTOLIC BLOOD PRESSURE: 186 MMHG | HEART RATE: 84 BPM | BODY MASS INDEX: 18.07 KG/M2 | WEIGHT: 102 LBS | HEIGHT: 63 IN

## 2020-09-22 PROCEDURE — 25010000002 ENOXAPARIN PER 10 MG: Performed by: INTERNAL MEDICINE

## 2020-09-22 PROCEDURE — 99217 PR OBSERVATION CARE DISCHARGE MANAGEMENT: CPT | Performed by: INTERNAL MEDICINE

## 2020-09-22 RX ORDER — AMLODIPINE BESYLATE 2.5 MG/1
2.5 TABLET ORAL
Start: 2020-09-22

## 2020-09-22 RX ORDER — ACETAMINOPHEN 500 MG
500 TABLET ORAL EVERY 6 HOURS
Start: 2020-09-22

## 2020-09-22 RX ORDER — TRAMADOL HYDROCHLORIDE 50 MG/1
50 TABLET ORAL EVERY 6 HOURS PRN
Qty: 12 TABLET | Refills: 0 | Status: SHIPPED | OUTPATIENT
Start: 2020-09-22 | End: 2020-10-01 | Stop reason: HOSPADM

## 2020-09-22 RX ORDER — OXYCODONE HYDROCHLORIDE 5 MG/1
5 TABLET ORAL EVERY 8 HOURS PRN
Qty: 9 TABLET | Refills: 0 | Status: SHIPPED | OUTPATIENT
Start: 2020-09-22 | End: 2020-09-22 | Stop reason: HOSPADM

## 2020-09-22 RX ADMIN — AMLODIPINE BESYLATE 2.5 MG: 2.5 TABLET ORAL at 08:10

## 2020-09-22 RX ADMIN — POLYETHYLENE GLYCOL 3350 17 G: 17 POWDER, FOR SOLUTION ORAL at 08:09

## 2020-09-22 RX ADMIN — PANTOPRAZOLE SODIUM 40 MG: 40 TABLET, DELAYED RELEASE ORAL at 08:10

## 2020-09-22 RX ADMIN — ENOXAPARIN SODIUM 30 MG: 30 INJECTION SUBCUTANEOUS at 08:09

## 2020-09-22 RX ADMIN — SERTRALINE HYDROCHLORIDE 25 MG: 25 TABLET ORAL at 08:10

## 2020-09-22 RX ADMIN — DOCUSATE SODIUM 200 MG: 100 CAPSULE, LIQUID FILLED ORAL at 08:10

## 2020-09-22 RX ADMIN — LEVOTHYROXINE SODIUM 100 MCG: 100 TABLET ORAL at 05:18

## 2020-09-22 RX ADMIN — ACETAMINOPHEN 500 MG: 500 TABLET, FILM COATED ORAL at 05:18

## 2020-09-22 NOTE — PROGRESS NOTES
"BP (!) 186/91   Pulse 84   Temp 98.1 °F (36.7 °C) (Oral)   Resp 17   Ht 160 cm (63\")   Wt 46.3 kg (102 lb)   SpO2 90%   Breastfeeding No   BMI 18.07 kg/m²     Lab Results (last 24 hours)     Procedure Component Value Units Date/Time    Potassium [[id number]]  (Normal) Collected: 09/21/20 2022    Specimen: Blood Updated: 09/21/20 2117     Potassium 4.1 mmol/L     COVID PRE-OP / PRE-PROCEDURE SCREENING ORDER (NO ISOLATION) - Swab, Nasal Cavity [[id number]]  (Normal) Collected: 09/21/20 1303    Specimen: Swab from Nasal Cavity Updated: 09/21/20 1346    Narrative:      The following orders were created for panel order COVID PRE-OP / PRE-PROCEDURE SCREENING ORDER (NO ISOLATION) - Swab, Nasal Cavity.  Procedure                               Abnormality         Status                     ---------                               -----------         ------                     COVID-19, ABBOTT IN-HOUS...[[id number]]  Normal              Final result                 Please view results for these tests on the individual orders.    COVID-19, ABBOTT IN-HOUSE,NP Swab (NO TRANSPORT MEDIA) 2 HR TAT - Swab, Nasal Cavity [[id number]]  (Normal) Collected: 09/21/20 1303    Specimen: Swab from Nasal Cavity Updated: 09/21/20 1346     COVID19 Not Detected    Narrative:      Fact sheet for providers: https://www.fda.gov/media/856367/download     Fact sheet for patients: https://www.fda.gov/media/861148/download          Imaging Results (Last 24 Hours)     Procedure Component Value Units Date/Time    XR Hip With or Without Pelvis 2 - 3 View Left [[id number]] Collected: 09/18/20 1527     Updated: 09/21/20 1245    Narrative:      EXAMINATION: XR HIP W OR WO PELVIS, 2-3 VIEW, LEFT-09/18/2020:      INDICATION: Post-Op Hip Arthroplasty; S72.032A-Displaced midcervical  fracture of left femur, initial encounter for closed fracture.      COMPARISON: 09/17/2020.     FINDINGS: Status post left total hip arthroplasty with expected  postsurgical " changes in the adjacent soft tissues in grossly anatomic  alignment.           Impression:      Status post left total hip arthroplasty in grossly anatomic  alignment.     D:  09/18/2020  E:  09/18/2020     This report was finalized on 9/21/2020 12:42 PM by Dr. Soto Null.             Patient Care Team:  Sandra Adkins MD as PCP - General  Sandra Adkins MD as PCP - Family Medicine  Rob Woods MD as Consulting Physician (Orthopedic Surgery)  Shade Nesbitt MD as Consulting Physician (Ophthalmology)    SUBJECTIVE: She reports she is doing well.  Pain is well controlled.  She walked 20 feet with therapy yesterday.  Her daughter is at her bedside and is pleased with her progress.  She anticipates going to rehab today.    PHYSICAL EXAM  Left hip incision is clean, dry and intact.  The mesh dressing has been pulled off  Thigh and calf soft and nontender  She is able to perform a straight leg raise  Neurovascular intact distally       Closed transcervical fracture of left femur (CMS/HCC)    Hypercholesteremia    Benign essential hypertension    At high risk for falls    COPD (chronic obstructive pulmonary disease) with chronic bronchitis (CMS/HCC)    Hypothyroidism (acquired)    Leukocytosis    Fall    Dementia (CMS/HCC)      PLAN / DISPOSITION: 89-year-old female postop day #4 status post left hip hemiarthroplasty  -Okay with me to transfer to rehab today as scheduled  -Continue to mobilize with therapy as tolerated with posterior hip precautions  -She needs to have the hip abduction pillow in place when she is sleeping  -Continue Lovenox for DVT prophylaxis until 4 weeks postop  -Follow-up with me in the office in 2 to 3 weeks    Clarence Hill MD  09/22/20  10:13 EDT

## 2020-09-22 NOTE — PLAN OF CARE
Problem: Fall Injury Risk  Goal: Absence of Fall and Fall-Related Injury  Outcome: Ongoing, Progressing  Intervention: Identify and Manage Contributors to Fall Injury Risk  Recent Flowsheet Documentation  Taken 9/22/2020 0400 by Roberto Bragg RN  Medication Review/Management: medications reviewed  Taken 9/22/2020 0200 by Roberto Bragg RN  Medication Review/Management: medications reviewed  Taken 9/21/2020 2358 by Roberto Bragg RN  Medication Review/Management: medications reviewed  Taken 9/21/2020 2200 by Roberto Bragg RN  Medication Review/Management: medications reviewed  Taken 9/21/2020 2000 by Roberto Bragg RN  Medication Review/Management: medications reviewed  Intervention: Promote Injury-Free Environment  Recent Flowsheet Documentation  Taken 9/22/2020 0400 by Roberto Bragg RN  Safety Promotion/Fall Prevention: activity supervised  Taken 9/22/2020 0200 by Roberto Bragg RN  Safety Promotion/Fall Prevention: activity supervised  Taken 9/21/2020 2358 by Roberto Bragg RN  Safety Promotion/Fall Prevention: activity supervised  Taken 9/21/2020 2200 by Roberto Bragg RN  Safety Promotion/Fall Prevention: activity supervised  Taken 9/21/2020 2000 by Roberto Bragg RN  Safety Promotion/Fall Prevention: activity supervised     Problem: Skin Injury Risk Increased  Goal: Skin Health and Integrity  Outcome: Ongoing, Progressing  Intervention: Optimize Skin Protection  Recent Flowsheet Documentation  Taken 9/22/2020 0400 by Roberto Bragg RN  Pressure Reduction Techniques: frequent weight shift encouraged  Head of Bed (HOB): HOB elevated  Pressure Reduction Devices: pressure-redistributing mattress utilized  Skin Protection: adhesive use limited  Taken 9/22/2020 0200 by Roberto Bragg RN  Pressure Reduction Techniques: frequent weight shift encouraged  Head of Bed (HOB): HOB elevated  Pressure Reduction Devices: pressure-redistributing mattress utilized  Skin Protection:   adhesive use  limited   incontinence pads utilized  Taken 9/21/2020 2358 by Roberto Bragg RN  Pressure Reduction Techniques: frequent weight shift encouraged  Head of Bed (HOB): [facility] elevated  Pressure Reduction Devices: pressure-redistributing mattress utilized  Skin Protection: adhesive use limited  Taken 9/21/2020 2200 by Roberto Bragg RN  Pressure Reduction Techniques: frequent weight shift encouraged  Head of Bed (HOB): [facility] elevated  Pressure Reduction Devices: pressure-redistributing mattress utilized  Skin Protection: adhesive use limited  Taken 9/21/2020 2000 by Roberto Bragg RN  Pressure Reduction Techniques: frequent weight shift encouraged  Head of Bed (HOB): [facility] elevated  Pressure Reduction Devices: pressure-redistributing mattress utilized  Skin Protection: adhesive use limited     Problem: Adult Inpatient Plan of Care  Goal: Plan of Care Review  Outcome: Ongoing, Progressing  Flowsheets (Taken 9/21/2020 1741 by Lilo Barakat RN)  Plan of Care Reviewed With: patient  Goal: Patient-Specific Goal (Individualized)  Outcome: Ongoing, Progressing  Goal: Absence of Hospital-Acquired Illness or Injury  Outcome: Ongoing, Progressing  Intervention: Identify and Manage Fall Risk  Recent Flowsheet Documentation  Taken 9/22/2020 0400 by Roberto Bragg RN  Safety Promotion/Fall Prevention: activity supervised  Taken 9/22/2020 0200 by Roberto Bragg RN  Safety Promotion/Fall Prevention: activity supervised  Taken 9/21/2020 2358 by Roberto Bragg RN  Safety Promotion/Fall Prevention: activity supervised  Taken 9/21/2020 2200 by Roberto Bragg RN  Safety Promotion/Fall Prevention: activity supervised  Taken 9/21/2020 2000 by Roberto Bragg RN  Safety Promotion/Fall Prevention: activity supervised  Intervention: Prevent Skin Injury  Recent Flowsheet Documentation  Taken 9/22/2020 0400 by Roberto Bragg RN  Body Position: supine  Taken 9/22/2020 0200 by Roberto Bragg RN  Body Position: supine  Taken  9/21/2020 2358 by Roberto Bragg RN  Body Position: supine  Taken 9/21/2020 2200 by Roberto Bragg RN  Body Position: supine  Taken 9/21/2020 2000 by Roberto Bragg RN  Body Position: supine  Intervention: Prevent and Manage VTE (venous thromboembolism) Risk  Recent Flowsheet Documentation  Taken 9/22/2020 0400 by Roberto Bragg RN  VTE Prevention/Management: sequential compression devices off  Taken 9/22/2020 0200 by Roberto Bragg RN  VTE Prevention/Management: sequential compression devices off  Taken 9/21/2020 2358 by Roberto Bragg RN  VTE Prevention/Management: sequential compression devices off  Taken 9/21/2020 2200 by Roberto Bragg RN  VTE Prevention/Management: sequential compression devices off  Taken 9/21/2020 2000 by Roberto Bragg RN  VTE Prevention/Management: sequential compression devices off  Intervention: Prevent Infection  Recent Flowsheet Documentation  Taken 9/22/2020 0200 by Roberto Bragg RN  Infection Prevention: cohorting utilized  Taken 9/21/2020 2358 by Roberto Bragg RN  Infection Prevention: cohorting utilized  Taken 9/21/2020 2200 by Roberto Bragg RN  Infection Prevention: cohorting utilized  Taken 9/21/2020 2000 by Roberto Bragg RN  Infection Prevention: cohorting utilized  Goal: Optimal Comfort and Wellbeing  Outcome: Ongoing, Progressing  Intervention: Provide Person-Centered Care  Recent Flowsheet Documentation  Taken 9/21/2020 2000 by Roberto Bragg RN  Trust Relationship/Rapport:   care explained   choices provided   reassurance provided  Goal: Readiness for Transition of Care  Outcome: Ongoing, Progressing     Problem: Bleeding (Hip Arthroplasty)  Goal: Absence of Bleeding  Outcome: Ongoing, Progressing     Problem: Joint Function Impaired (Hip Arthroplasty)  Goal: Optimal Functional Ability  Outcome: Ongoing, Progressing  Intervention: Protect Joint Integrity  Recent Flowsheet Documentation  Taken 9/22/2020 0400 by Roberto Bragg  RN  Positioning/Transfer Devices:   pillows   in use  Taken 9/22/2020 0200 by Roberto Bragg RN  Positioning/Transfer Devices:   pillows   in use  Taken 9/21/2020 2358 by Roberto Bragg RN  Positioning/Transfer Devices:   in use   pillows  Taken 9/21/2020 2200 by Roebrto Bragg RN  Positioning/Transfer Devices:   in use   pillows  Taken 9/21/2020 2000 by Roberto Bragg RN  Positioning/Transfer Devices:   in use   pillows     Problem: Postoperative Urinary Retention (Hip Arthroplasty)  Goal: Effective Urinary Elimination  Outcome: Ongoing, Progressing  Intervention: Monitor and Manage Urinary Retention  Recent Flowsheet Documentation  Taken 9/21/2020 2000 by Roberto Bragg RN  Urinary Elimination Promotion: absorbent pad/diaper use encouraged   Goal Outcome Evaluation:  Plan of Care Reviewed With: patient  Progress: improving

## 2020-09-22 NOTE — PLAN OF CARE
Problem: Fall Injury Risk  Goal: Absence of Fall and Fall-Related Injury  Outcome: Ongoing, Progressing  Intervention: Identify and Manage Contributors to Fall Injury Risk  Recent Flowsheet Documentation  Taken 9/22/2020 0800 by Rebekah Garrido RN  Medication Review/Management: medications reviewed  Intervention: Promote Injury-Free Environment  Recent Flowsheet Documentation  Taken 9/22/2020 1030 by Rebekah Garrido RN  Safety Promotion/Fall Prevention:   activity supervised   assistive device/personal items within reach   clutter free environment maintained   fall prevention program maintained   gait belt   toileting scheduled   safety round/check completed   room organization consistent   nonskid shoes/slippers when out of bed  Taken 9/22/2020 0800 by Rebekah Garrido RN  Safety Promotion/Fall Prevention:   activity supervised   assistive device/personal items within reach   clutter free environment maintained   fall prevention program maintained   gait belt   toileting scheduled   safety round/check completed   room organization consistent   nonskid shoes/slippers when out of bed     Problem: Skin Injury Risk Increased  Goal: Skin Health and Integrity  Outcome: Ongoing, Progressing  Intervention: Optimize Skin Protection  Recent Flowsheet Documentation  Taken 9/22/2020 1030 by Rebekah Garrido RN  Pressure Reduction Techniques: frequent weight shift encouraged  Head of Bed (HOB): HOB at 30-45 degrees  Pressure Reduction Devices: pressure-redistributing mattress utilized  Skin Protection:   adhesive use limited   transparent dressing maintained  Taken 9/22/2020 0800 by Rebekah Garrido RN  Pressure Reduction Techniques:   frequent weight shift encouraged   weight shift assistance provided  Head of Bed (HOB): HOB at 60-90 degrees  Pressure Reduction Devices: pressure-redistributing mattress utilized  Skin Protection:   adhesive use limited   transparent dressing maintained   tubing/devices free from skin  contact     Problem: Adult Inpatient Plan of Care  Goal: Plan of Care Review  Outcome: Ongoing, Progressing  Flowsheets (Taken 9/22/2020 1142)  Progress: improving  Plan of Care Reviewed With: patient  Goal: Patient-Specific Goal (Individualized)  Outcome: Ongoing, Progressing  Goal: Absence of Hospital-Acquired Illness or Injury  Outcome: Ongoing, Progressing  Intervention: Identify and Manage Fall Risk  Recent Flowsheet Documentation  Taken 9/22/2020 1030 by Rebekah Garrido RN  Safety Promotion/Fall Prevention:   activity supervised   assistive device/personal items within reach   clutter free environment maintained   fall prevention program maintained   gait belt   toileting scheduled   safety round/check completed   room organization consistent   nonskid shoes/slippers when out of bed  Taken 9/22/2020 0800 by Rebekah Garrido RN  Safety Promotion/Fall Prevention:   activity supervised   assistive device/personal items within reach   clutter free environment maintained   fall prevention program maintained   gait belt   toileting scheduled   safety round/check completed   room organization consistent   nonskid shoes/slippers when out of bed  Intervention: Prevent Skin Injury  Recent Flowsheet Documentation  Taken 9/22/2020 1030 by Rebekah Garrido RN  Body Position: supine  Taken 9/22/2020 0800 by Rebekah Garrido RN  Body Position:   weight shift assistance provided   sitting up in bed  Intervention: Prevent and Manage VTE (venous thromboembolism) Risk  Recent Flowsheet Documentation  Taken 9/22/2020 1030 by Rebekah Garrido RN  VTE Prevention/Management:   bilateral   sequential compression devices off  Taken 9/22/2020 0800 by Rebekah Garrido RN  VTE Prevention/Management:   bilateral   sequential compression devices off  Intervention: Prevent Infection  Recent Flowsheet Documentation  Taken 9/22/2020 1030 by Rebekah Garrido RN  Infection Prevention: environmental surveillance performed  Taken 9/22/2020 0800  by Rebekah Garrido, RN  Infection Prevention: environmental surveillance performed  Goal: Optimal Comfort and Wellbeing  Outcome: Ongoing, Progressing  Intervention: Provide Person-Centered Care  Recent Flowsheet Documentation  Taken 9/22/2020 1030 by Rebekah Garrido, RN  Trust Relationship/Rapport:   care explained   choices provided   questions encouraged  Taken 9/22/2020 0800 by Rebekah Garrido RN  Trust Relationship/Rapport:   care explained   choices provided   questions encouraged   questions answered  Goal: Readiness for Transition of Care  Outcome: Ongoing, Progressing     Problem: Bleeding (Hip Arthroplasty)  Goal: Absence of Bleeding  Outcome: Ongoing, Progressing     Problem: Joint Function Impaired (Hip Arthroplasty)  Goal: Optimal Functional Ability  Outcome: Ongoing, Progressing  Intervention: Protect Joint Integrity  Recent Flowsheet Documentation  Taken 9/22/2020 1030 by Rebekah Garrido RN  Positioning/Transfer Devices:   pillows   in use  Taken 9/22/2020 0800 by Rebekah Garrido, RN  Positioning/Transfer Devices:   pillows   in use     Problem: Postoperative Urinary Retention (Hip Arthroplasty)  Goal: Effective Urinary Elimination  Outcome: Ongoing, Progressing  Intervention: Monitor and Manage Urinary Retention  Recent Flowsheet Documentation  Taken 9/22/2020 1030 by Rebekah Garrido RN  Urinary Elimination Promotion:   toileting offered   toileting scheduled  Taken 9/22/2020 0800 by Rebekah Garrido, RN  Urinary Elimination Promotion:   toileting offered   toileting scheduled   Goal Outcome Evaluation:  Plan of Care Reviewed With: patient  Progress: improving

## 2020-09-28 ENCOUNTER — APPOINTMENT (OUTPATIENT)
Dept: GENERAL RADIOLOGY | Facility: HOSPITAL | Age: 85
End: 2020-09-28

## 2020-09-28 ENCOUNTER — HOSPITAL ENCOUNTER (INPATIENT)
Facility: HOSPITAL | Age: 85
LOS: 2 days | Discharge: NURSING, REHAB OR LONG-TERM CARE | End: 2020-10-01
Attending: EMERGENCY MEDICINE | Admitting: INTERNAL MEDICINE

## 2020-09-28 DIAGNOSIS — G89.29 CHRONIC RIGHT-SIDED LOW BACK PAIN WITH RIGHT-SIDED SCIATICA: Chronic | ICD-10-CM

## 2020-09-28 DIAGNOSIS — S73.005A DISLOCATION OF LEFT HIP, INITIAL ENCOUNTER (HCC): Primary | ICD-10-CM

## 2020-09-28 DIAGNOSIS — M54.41 CHRONIC RIGHT-SIDED LOW BACK PAIN WITH RIGHT-SIDED SCIATICA: Chronic | ICD-10-CM

## 2020-09-28 DIAGNOSIS — T84.031A FEMORAL LOOSENING OF PROSTHETIC LEFT HIP, INITIAL ENCOUNTER (HCC): ICD-10-CM

## 2020-09-28 LAB
ANION GAP SERPL CALCULATED.3IONS-SCNC: 12 MMOL/L (ref 5–15)
BASOPHILS # BLD AUTO: 0.05 10*3/MM3 (ref 0–0.2)
BASOPHILS NFR BLD AUTO: 0.4 % (ref 0–1.5)
BUN SERPL-MCNC: 10 MG/DL (ref 8–23)
BUN/CREAT SERPL: 18.5 (ref 7–25)
CALCIUM SPEC-SCNC: 8.4 MG/DL (ref 8.2–9.6)
CHLORIDE SERPL-SCNC: 90 MMOL/L (ref 98–107)
CO2 SERPL-SCNC: 26 MMOL/L (ref 22–29)
CREAT SERPL-MCNC: 0.54 MG/DL (ref 0.57–1)
DEPRECATED RDW RBC AUTO: 46.7 FL (ref 37–54)
EOSINOPHIL # BLD AUTO: 0.17 10*3/MM3 (ref 0–0.4)
EOSINOPHIL NFR BLD AUTO: 1.4 % (ref 0.3–6.2)
ERYTHROCYTE [DISTWIDTH] IN BLOOD BY AUTOMATED COUNT: 14 % (ref 12.3–15.4)
GFR SERPL CREATININE-BSD FRML MDRD: 105 ML/MIN/1.73
GLUCOSE SERPL-MCNC: 102 MG/DL (ref 65–99)
HCT VFR BLD AUTO: 31.7 % (ref 34–46.6)
HGB BLD-MCNC: 10.6 G/DL (ref 12–15.9)
IMM GRANULOCYTES # BLD AUTO: 0.06 10*3/MM3 (ref 0–0.05)
IMM GRANULOCYTES NFR BLD AUTO: 0.5 % (ref 0–0.5)
LYMPHOCYTES # BLD AUTO: 1.54 10*3/MM3 (ref 0.7–3.1)
LYMPHOCYTES NFR BLD AUTO: 12.8 % (ref 19.6–45.3)
MCH RBC QN AUTO: 31 PG (ref 26.6–33)
MCHC RBC AUTO-ENTMCNC: 33.4 G/DL (ref 31.5–35.7)
MCV RBC AUTO: 92.7 FL (ref 79–97)
MONOCYTES # BLD AUTO: 1.33 10*3/MM3 (ref 0.1–0.9)
MONOCYTES NFR BLD AUTO: 11.1 % (ref 5–12)
NEUTROPHILS NFR BLD AUTO: 73.8 % (ref 42.7–76)
NEUTROPHILS NFR BLD AUTO: 8.84 10*3/MM3 (ref 1.7–7)
NRBC BLD AUTO-RTO: 0 /100 WBC (ref 0–0.2)
PLATELET # BLD AUTO: 414 10*3/MM3 (ref 140–450)
PMV BLD AUTO: 8.6 FL (ref 6–12)
POTASSIUM SERPL-SCNC: 3.2 MMOL/L (ref 3.5–5.2)
RBC # BLD AUTO: 3.42 10*6/MM3 (ref 3.77–5.28)
SODIUM SERPL-SCNC: 128 MMOL/L (ref 136–145)
WBC # BLD AUTO: 11.99 10*3/MM3 (ref 3.4–10.8)

## 2020-09-28 PROCEDURE — 25010000002 HYDROMORPHONE PER 4 MG: Performed by: EMERGENCY MEDICINE

## 2020-09-28 PROCEDURE — 85025 COMPLETE CBC W/AUTO DIFF WBC: CPT | Performed by: EMERGENCY MEDICINE

## 2020-09-28 PROCEDURE — 83735 ASSAY OF MAGNESIUM: CPT | Performed by: INTERNAL MEDICINE

## 2020-09-28 PROCEDURE — 99152 MOD SED SAME PHYS/QHP 5/>YRS: CPT

## 2020-09-28 PROCEDURE — 73502 X-RAY EXAM HIP UNI 2-3 VIEWS: CPT

## 2020-09-28 PROCEDURE — 99284 EMERGENCY DEPT VISIT MOD MDM: CPT

## 2020-09-28 PROCEDURE — 80053 COMPREHEN METABOLIC PANEL: CPT | Performed by: EMERGENCY MEDICINE

## 2020-09-28 PROCEDURE — 25010000002 ONDANSETRON PER 1 MG: Performed by: PHYSICIAN ASSISTANT

## 2020-09-28 RX ORDER — SODIUM CHLORIDE 0.9 % (FLUSH) 0.9 %
10 SYRINGE (ML) INJECTION AS NEEDED
Status: DISCONTINUED | OUTPATIENT
Start: 2020-09-28 | End: 2020-10-01 | Stop reason: HOSPADM

## 2020-09-28 RX ORDER — ONDANSETRON 2 MG/ML
4 INJECTION INTRAMUSCULAR; INTRAVENOUS ONCE
Status: COMPLETED | OUTPATIENT
Start: 2020-09-28 | End: 2020-09-28

## 2020-09-28 RX ORDER — HYDROMORPHONE HYDROCHLORIDE 1 MG/ML
0.25 INJECTION, SOLUTION INTRAMUSCULAR; INTRAVENOUS; SUBCUTANEOUS ONCE
Status: COMPLETED | OUTPATIENT
Start: 2020-09-28 | End: 2020-09-28

## 2020-09-28 RX ADMIN — ONDANSETRON 4 MG: 2 INJECTION INTRAMUSCULAR; INTRAVENOUS at 21:07

## 2020-09-28 RX ADMIN — HYDROMORPHONE HYDROCHLORIDE 0.25 MG: 1 INJECTION, SOLUTION INTRAMUSCULAR; INTRAVENOUS; SUBCUTANEOUS at 21:07

## 2020-09-29 ENCOUNTER — APPOINTMENT (OUTPATIENT)
Dept: GENERAL RADIOLOGY | Facility: HOSPITAL | Age: 85
End: 2020-09-29

## 2020-09-29 ENCOUNTER — ANESTHESIA (OUTPATIENT)
Dept: PERIOP | Facility: HOSPITAL | Age: 85
End: 2020-09-29

## 2020-09-29 ENCOUNTER — ANESTHESIA EVENT (OUTPATIENT)
Dept: PERIOP | Facility: HOSPITAL | Age: 85
End: 2020-09-29

## 2020-09-29 PROBLEM — S73.005A HIP DISLOCATION, LEFT, INITIAL ENCOUNTER (HCC): Status: ACTIVE | Noted: 2020-09-29

## 2020-09-29 PROBLEM — E87.6 HYPOKALEMIA: Status: ACTIVE | Noted: 2020-09-29

## 2020-09-29 PROBLEM — E87.1 HYPONATREMIA: Status: ACTIVE | Noted: 2020-09-29

## 2020-09-29 LAB
ABO GROUP BLD: NORMAL
ALBUMIN SERPL-MCNC: 3.4 G/DL (ref 3.5–5.2)
ALBUMIN/GLOB SERPL: 1.2 G/DL
ALP SERPL-CCNC: 72 U/L (ref 39–117)
ALT SERPL W P-5'-P-CCNC: 14 U/L (ref 1–33)
ANION GAP SERPL CALCULATED.3IONS-SCNC: 14 MMOL/L (ref 5–15)
ANION GAP SERPL CALCULATED.3IONS-SCNC: 15 MMOL/L (ref 5–15)
APTT PPP: 34.9 SECONDS (ref 24–37)
AST SERPL-CCNC: 42 U/L (ref 1–32)
BASOPHILS # BLD AUTO: 0.07 10*3/MM3 (ref 0–0.2)
BASOPHILS NFR BLD AUTO: 0.4 % (ref 0–1.5)
BILIRUB SERPL-MCNC: 0.6 MG/DL (ref 0–1.2)
BLD GP AB SCN SERPL QL: NEGATIVE
BUN SERPL-MCNC: 9 MG/DL (ref 8–23)
BUN SERPL-MCNC: 9 MG/DL (ref 8–23)
BUN/CREAT SERPL: 15.5 (ref 7–25)
BUN/CREAT SERPL: 18.8 (ref 7–25)
CALCIUM SPEC-SCNC: 8 MG/DL (ref 8.2–9.6)
CALCIUM SPEC-SCNC: 8.4 MG/DL (ref 8.2–9.6)
CHLORIDE SERPL-SCNC: 91 MMOL/L (ref 98–107)
CHLORIDE SERPL-SCNC: 95 MMOL/L (ref 98–107)
CO2 SERPL-SCNC: 20 MMOL/L (ref 22–29)
CO2 SERPL-SCNC: 23 MMOL/L (ref 22–29)
CREAT SERPL-MCNC: 0.48 MG/DL (ref 0.57–1)
CREAT SERPL-MCNC: 0.58 MG/DL (ref 0.57–1)
DEPRECATED RDW RBC AUTO: 49.9 FL (ref 37–54)
EOSINOPHIL # BLD AUTO: 0.2 10*3/MM3 (ref 0–0.4)
EOSINOPHIL NFR BLD AUTO: 1.3 % (ref 0.3–6.2)
ERYTHROCYTE [DISTWIDTH] IN BLOOD BY AUTOMATED COUNT: 14.6 % (ref 12.3–15.4)
GFR SERPL CREATININE-BSD FRML MDRD: 120 ML/MIN/1.73
GFR SERPL CREATININE-BSD FRML MDRD: 97 ML/MIN/1.73
GLOBULIN UR ELPH-MCNC: 2.9 GM/DL
GLUCOSE SERPL-MCNC: 73 MG/DL (ref 65–99)
GLUCOSE SERPL-MCNC: 75 MG/DL (ref 65–99)
HCT VFR BLD AUTO: 33.9 % (ref 34–46.6)
HGB BLD-MCNC: 10.9 G/DL (ref 12–15.9)
IMM GRANULOCYTES # BLD AUTO: 0.12 10*3/MM3 (ref 0–0.05)
IMM GRANULOCYTES NFR BLD AUTO: 0.8 % (ref 0–0.5)
INR PPP: 1.06 (ref 0.85–1.16)
LYMPHOCYTES # BLD AUTO: 1.16 10*3/MM3 (ref 0.7–3.1)
LYMPHOCYTES NFR BLD AUTO: 7.4 % (ref 19.6–45.3)
MAGNESIUM SERPL-MCNC: 2.1 MG/DL (ref 1.7–2.3)
MAGNESIUM SERPL-MCNC: 2.1 MG/DL (ref 1.7–2.3)
MCH RBC QN AUTO: 31 PG (ref 26.6–33)
MCHC RBC AUTO-ENTMCNC: 32.2 G/DL (ref 31.5–35.7)
MCV RBC AUTO: 96.3 FL (ref 79–97)
MONOCYTES # BLD AUTO: 1.59 10*3/MM3 (ref 0.1–0.9)
MONOCYTES NFR BLD AUTO: 10.2 % (ref 5–12)
NEUTROPHILS NFR BLD AUTO: 12.46 10*3/MM3 (ref 1.7–7)
NEUTROPHILS NFR BLD AUTO: 79.9 % (ref 42.7–76)
NRBC BLD AUTO-RTO: 0 /100 WBC (ref 0–0.2)
PLATELET # BLD AUTO: 436 10*3/MM3 (ref 140–450)
PMV BLD AUTO: 9 FL (ref 6–12)
POTASSIUM SERPL-SCNC: 3.2 MMOL/L (ref 3.5–5.2)
POTASSIUM SERPL-SCNC: 3.2 MMOL/L (ref 3.5–5.2)
POTASSIUM SERPL-SCNC: 3.7 MMOL/L (ref 3.5–5.2)
PROT SERPL-MCNC: 6.3 G/DL (ref 6–8.5)
PROTHROMBIN TIME: 13.5 SECONDS (ref 11.5–14)
RBC # BLD AUTO: 3.52 10*6/MM3 (ref 3.77–5.28)
RH BLD: NEGATIVE
SARS-COV-2 RDRP RESP QL NAA+PROBE: NOT DETECTED
SARS-COV-2 RNA NOSE QL NAA+PROBE: NOT DETECTED
SODIUM SERPL-SCNC: 129 MMOL/L (ref 136–145)
SODIUM SERPL-SCNC: 129 MMOL/L (ref 136–145)
SODIUM SERPL-SCNC: 131 MMOL/L (ref 136–145)
T&S EXPIRATION DATE: NORMAL
WBC # BLD AUTO: 15.6 10*3/MM3 (ref 3.4–10.8)

## 2020-09-29 PROCEDURE — 0SRS0J9 REPLACEMENT OF LEFT HIP JOINT, FEMORAL SURFACE WITH SYNTHETIC SUBSTITUTE, CEMENTED, OPEN APPROACH: ICD-10-PCS | Performed by: ORTHOPAEDIC SURGERY

## 2020-09-29 PROCEDURE — 86850 RBC ANTIBODY SCREEN: CPT | Performed by: NURSE PRACTITIONER

## 2020-09-29 PROCEDURE — 84132 ASSAY OF SERUM POTASSIUM: CPT | Performed by: INTERNAL MEDICINE

## 2020-09-29 PROCEDURE — 27134 REVISE HIP JOINT REPLACEMENT: CPT | Performed by: PHYSICIAN ASSISTANT

## 2020-09-29 PROCEDURE — 85025 COMPLETE CBC W/AUTO DIFF WBC: CPT | Performed by: NURSE PRACTITIONER

## 2020-09-29 PROCEDURE — 73502 X-RAY EXAM HIP UNI 2-3 VIEWS: CPT

## 2020-09-29 PROCEDURE — C1713 ANCHOR/SCREW BN/BN,TIS/BN: HCPCS | Performed by: ORTHOPAEDIC SURGERY

## 2020-09-29 PROCEDURE — 25010000002 NEOSTIGMINE 10 MG/10ML SOLUTION: Performed by: NURSE ANESTHETIST, CERTIFIED REGISTERED

## 2020-09-29 PROCEDURE — 86901 BLOOD TYPING SEROLOGIC RH(D): CPT | Performed by: NURSE PRACTITIONER

## 2020-09-29 PROCEDURE — 25010000002 PROPOFOL 10 MG/ML EMULSION: Performed by: EMERGENCY MEDICINE

## 2020-09-29 PROCEDURE — 85610 PROTHROMBIN TIME: CPT | Performed by: NURSE PRACTITIONER

## 2020-09-29 PROCEDURE — 0SPS0JZ REMOVAL OF SYNTHETIC SUBSTITUTE FROM LEFT HIP JOINT, FEMORAL SURFACE, OPEN APPROACH: ICD-10-PCS | Performed by: ORTHOPAEDIC SURGERY

## 2020-09-29 PROCEDURE — 25010000002 PROPOFOL 10 MG/ML EMULSION: Performed by: NURSE ANESTHETIST, CERTIFIED REGISTERED

## 2020-09-29 PROCEDURE — C1776 JOINT DEVICE (IMPLANTABLE): HCPCS | Performed by: ORTHOPAEDIC SURGERY

## 2020-09-29 PROCEDURE — 25010000002 HYDROMORPHONE PER 4 MG: Performed by: EMERGENCY MEDICINE

## 2020-09-29 PROCEDURE — U0004 COV-19 TEST NON-CDC HGH THRU: HCPCS | Performed by: INTERNAL MEDICINE

## 2020-09-29 PROCEDURE — 84295 ASSAY OF SERUM SODIUM: CPT | Performed by: ORTHOPAEDIC SURGERY

## 2020-09-29 PROCEDURE — 25010000003 LIDOCAINE 1 % SOLUTION: Performed by: NURSE ANESTHETIST, CERTIFIED REGISTERED

## 2020-09-29 PROCEDURE — 25010000002 MORPHINE PER 10 MG: Performed by: INTERNAL MEDICINE

## 2020-09-29 PROCEDURE — 25010000002 DEXAMETHASONE PER 1 MG: Performed by: NURSE ANESTHETIST, CERTIFIED REGISTERED

## 2020-09-29 PROCEDURE — 25010000002 FENTANYL CITRATE (PF) 100 MCG/2ML SOLUTION: Performed by: NURSE ANESTHETIST, CERTIFIED REGISTERED

## 2020-09-29 PROCEDURE — 86900 BLOOD TYPING SEROLOGIC ABO: CPT | Performed by: NURSE PRACTITIONER

## 2020-09-29 PROCEDURE — 71045 X-RAY EXAM CHEST 1 VIEW: CPT

## 2020-09-29 PROCEDURE — 87635 SARS-COV-2 COVID-19 AMP PRB: CPT | Performed by: ORTHOPAEDIC SURGERY

## 2020-09-29 PROCEDURE — 83735 ASSAY OF MAGNESIUM: CPT | Performed by: NURSE PRACTITIONER

## 2020-09-29 PROCEDURE — 85730 THROMBOPLASTIN TIME PARTIAL: CPT | Performed by: NURSE PRACTITIONER

## 2020-09-29 PROCEDURE — 73552 X-RAY EXAM OF FEMUR 2/>: CPT

## 2020-09-29 PROCEDURE — 99223 1ST HOSP IP/OBS HIGH 75: CPT | Performed by: INTERNAL MEDICINE

## 2020-09-29 PROCEDURE — 25010000002 ONDANSETRON PER 1 MG: Performed by: NURSE ANESTHETIST, CERTIFIED REGISTERED

## 2020-09-29 PROCEDURE — 80048 BASIC METABOLIC PNL TOTAL CA: CPT | Performed by: NURSE PRACTITIONER

## 2020-09-29 DEVICE — CMT BONE SIMPLEX/P FULL DOSE 10/PK: Type: IMPLANTABLE DEVICE | Site: HIP | Status: FUNCTIONAL

## 2020-09-29 DEVICE — BONE PREPARATION KIT
Type: IMPLANTABLE DEVICE | Site: HIP | Status: FUNCTIONAL
Brand: BIOPREP

## 2020-09-29 DEVICE — CABL W CR1.7MM 750ML STRL: Type: IMPLANTABLE DEVICE | Status: FUNCTIONAL

## 2020-09-29 DEVICE — DEV CONTRL TISS STRATAFIX SPIRAL MNCRYL UD 3/0 PLS 60CM: Type: IMPLANTABLE DEVICE | Site: HIP | Status: FUNCTIONAL

## 2020-09-29 DEVICE — SUT FW #2 W/TPR NDL 1/2 CIR 38IN 97CM 26.5MM BLU: Type: IMPLANTABLE DEVICE | Site: HIP | Status: FUNCTIONAL

## 2020-09-29 DEVICE — INVIS DISTAL CENTRALIZER SIZE 10MM
Type: IMPLANTABLE DEVICE | Site: HIP | Status: FUNCTIONAL
Brand: INVIS

## 2020-09-29 DEVICE — SYNERGY CEMENTED FEMORAL COMPONENT                                    SZ 12
Type: IMPLANTABLE DEVICE | Site: HIP | Status: FUNCTIONAL
Brand: SYNERGY

## 2020-09-29 DEVICE — COBALT CHROME 12/14 TAPER FEMORAL                                    HEAD 28MM + 0: Type: IMPLANTABLE DEVICE | Site: HIP | Status: FUNCTIONAL

## 2020-09-29 DEVICE — TANDEM BIPOLAR COBALT CHROME 44MM                                    OUTER DIAMETER 28MM INNER DIAMETER
Type: IMPLANTABLE DEVICE | Site: HIP | Status: FUNCTIONAL
Brand: TANDEM

## 2020-09-29 RX ORDER — CLINDAMYCIN PHOSPHATE 900 MG/50ML
900 INJECTION INTRAVENOUS ONCE
Status: COMPLETED | OUTPATIENT
Start: 2020-09-29 | End: 2020-09-29

## 2020-09-29 RX ORDER — POTASSIUM CHLORIDE 750 MG/1
40 CAPSULE, EXTENDED RELEASE ORAL AS NEEDED
Status: DISCONTINUED | OUTPATIENT
Start: 2020-09-29 | End: 2020-10-01 | Stop reason: HOSPADM

## 2020-09-29 RX ORDER — NALOXONE HCL 0.4 MG/ML
0.1 VIAL (ML) INJECTION
Status: DISCONTINUED | OUTPATIENT
Start: 2020-09-29 | End: 2020-10-01 | Stop reason: HOSPADM

## 2020-09-29 RX ORDER — OXYCODONE HYDROCHLORIDE 5 MG/1
5 TABLET ORAL EVERY 4 HOURS PRN
Status: DISCONTINUED | OUTPATIENT
Start: 2020-09-29 | End: 2020-10-01 | Stop reason: HOSPADM

## 2020-09-29 RX ORDER — BISACODYL 5 MG/1
10 TABLET, DELAYED RELEASE ORAL DAILY PRN
Status: DISCONTINUED | OUTPATIENT
Start: 2020-09-29 | End: 2020-10-01 | Stop reason: HOSPADM

## 2020-09-29 RX ORDER — ONDANSETRON 4 MG/1
4 TABLET, FILM COATED ORAL EVERY 6 HOURS PRN
Status: DISCONTINUED | OUTPATIENT
Start: 2020-09-29 | End: 2020-09-30

## 2020-09-29 RX ORDER — SODIUM CHLORIDE 9 MG/ML
75 INJECTION, SOLUTION INTRAVENOUS CONTINUOUS
Status: DISCONTINUED | OUTPATIENT
Start: 2020-09-29 | End: 2020-09-30

## 2020-09-29 RX ORDER — LEVOTHYROXINE SODIUM 0.1 MG/1
100 TABLET ORAL DAILY
Status: DISCONTINUED | OUTPATIENT
Start: 2020-09-29 | End: 2020-10-01 | Stop reason: HOSPADM

## 2020-09-29 RX ORDER — DEXAMETHASONE SODIUM PHOSPHATE 4 MG/ML
INJECTION, SOLUTION INTRA-ARTICULAR; INTRALESIONAL; INTRAMUSCULAR; INTRAVENOUS; SOFT TISSUE AS NEEDED
Status: DISCONTINUED | OUTPATIENT
Start: 2020-09-29 | End: 2020-09-29 | Stop reason: SURG

## 2020-09-29 RX ORDER — PROMETHAZINE HYDROCHLORIDE 25 MG/1
25 TABLET ORAL ONCE AS NEEDED
Status: DISCONTINUED | OUTPATIENT
Start: 2020-09-29 | End: 2020-09-29

## 2020-09-29 RX ORDER — IPRATROPIUM BROMIDE AND ALBUTEROL SULFATE 2.5; .5 MG/3ML; MG/3ML
3 SOLUTION RESPIRATORY (INHALATION) ONCE AS NEEDED
Status: DISCONTINUED | OUTPATIENT
Start: 2020-09-29 | End: 2020-09-29

## 2020-09-29 RX ORDER — HYDRALAZINE HYDROCHLORIDE 20 MG/ML
5 INJECTION INTRAMUSCULAR; INTRAVENOUS
Status: DISCONTINUED | OUTPATIENT
Start: 2020-09-29 | End: 2020-09-29

## 2020-09-29 RX ORDER — PROPOFOL 10 MG/ML
VIAL (ML) INTRAVENOUS AS NEEDED
Status: DISCONTINUED | OUTPATIENT
Start: 2020-09-29 | End: 2020-09-29 | Stop reason: SURG

## 2020-09-29 RX ORDER — FENTANYL CITRATE 50 UG/ML
50 INJECTION, SOLUTION INTRAMUSCULAR; INTRAVENOUS
Status: DISCONTINUED | OUTPATIENT
Start: 2020-09-29 | End: 2020-09-29

## 2020-09-29 RX ORDER — ONDANSETRON 2 MG/ML
INJECTION INTRAMUSCULAR; INTRAVENOUS AS NEEDED
Status: DISCONTINUED | OUTPATIENT
Start: 2020-09-29 | End: 2020-09-29 | Stop reason: SURG

## 2020-09-29 RX ORDER — PROPOFOL 10 MG/ML
VIAL (ML) INTRAVENOUS
Status: COMPLETED | OUTPATIENT
Start: 2020-09-29 | End: 2020-09-29

## 2020-09-29 RX ORDER — SODIUM CHLORIDE 0.9 % (FLUSH) 0.9 %
10 SYRINGE (ML) INJECTION EVERY 12 HOURS SCHEDULED
Status: DISCONTINUED | OUTPATIENT
Start: 2020-09-29 | End: 2020-10-01 | Stop reason: HOSPADM

## 2020-09-29 RX ORDER — OXYBUTYNIN CHLORIDE 10 MG/1
10 TABLET, EXTENDED RELEASE ORAL DAILY
Status: DISCONTINUED | OUTPATIENT
Start: 2020-09-30 | End: 2020-10-01 | Stop reason: HOSPADM

## 2020-09-29 RX ORDER — HYDROCODONE BITARTRATE AND ACETAMINOPHEN 5; 325 MG/1; MG/1
1 TABLET ORAL EVERY 4 HOURS PRN
Status: DISCONTINUED | OUTPATIENT
Start: 2020-09-29 | End: 2020-10-01 | Stop reason: HOSPADM

## 2020-09-29 RX ORDER — NALOXONE HCL 0.4 MG/ML
0.4 VIAL (ML) INJECTION AS NEEDED
Status: DISCONTINUED | OUTPATIENT
Start: 2020-09-29 | End: 2020-10-01 | Stop reason: HOSPADM

## 2020-09-29 RX ORDER — PROMETHAZINE HYDROCHLORIDE 25 MG/1
25 SUPPOSITORY RECTAL ONCE AS NEEDED
Status: DISCONTINUED | OUTPATIENT
Start: 2020-09-29 | End: 2020-09-29

## 2020-09-29 RX ORDER — SODIUM CHLORIDE, SODIUM LACTATE, POTASSIUM CHLORIDE, CALCIUM CHLORIDE 600; 310; 30; 20 MG/100ML; MG/100ML; MG/100ML; MG/100ML
9 INJECTION, SOLUTION INTRAVENOUS CONTINUOUS PRN
Status: DISCONTINUED | OUTPATIENT
Start: 2020-09-29 | End: 2020-10-01 | Stop reason: HOSPADM

## 2020-09-29 RX ORDER — GLYCOPYRROLATE 0.2 MG/ML
INJECTION INTRAMUSCULAR; INTRAVENOUS AS NEEDED
Status: DISCONTINUED | OUTPATIENT
Start: 2020-09-29 | End: 2020-09-29 | Stop reason: SURG

## 2020-09-29 RX ORDER — DONEPEZIL HYDROCHLORIDE 5 MG/1
5 TABLET, FILM COATED ORAL NIGHTLY
Status: DISCONTINUED | OUTPATIENT
Start: 2020-09-29 | End: 2020-10-01 | Stop reason: HOSPADM

## 2020-09-29 RX ORDER — AMLODIPINE BESYLATE 2.5 MG/1
2.5 TABLET ORAL
Status: DISCONTINUED | OUTPATIENT
Start: 2020-09-30 | End: 2020-10-01 | Stop reason: HOSPADM

## 2020-09-29 RX ORDER — POTASSIUM CHLORIDE 1.5 G/1.77G
40 POWDER, FOR SOLUTION ORAL AS NEEDED
Status: DISCONTINUED | OUTPATIENT
Start: 2020-09-29 | End: 2020-10-01 | Stop reason: HOSPADM

## 2020-09-29 RX ORDER — ACETAMINOPHEN 325 MG/1
650 TABLET ORAL EVERY 4 HOURS PRN
Status: DISCONTINUED | OUTPATIENT
Start: 2020-09-29 | End: 2020-10-01 | Stop reason: HOSPADM

## 2020-09-29 RX ORDER — SERTRALINE HYDROCHLORIDE 25 MG/1
25 TABLET, FILM COATED ORAL DAILY
Status: DISCONTINUED | OUTPATIENT
Start: 2020-09-30 | End: 2020-10-01 | Stop reason: HOSPADM

## 2020-09-29 RX ORDER — ONDANSETRON 4 MG/1
4 TABLET, FILM COATED ORAL EVERY 6 HOURS PRN
Status: DISCONTINUED | OUTPATIENT
Start: 2020-09-29 | End: 2020-10-01 | Stop reason: HOSPADM

## 2020-09-29 RX ORDER — DIPHENHYDRAMINE HCL 25 MG
25 CAPSULE ORAL EVERY 6 HOURS PRN
Status: DISCONTINUED | OUTPATIENT
Start: 2020-09-29 | End: 2020-10-01 | Stop reason: HOSPADM

## 2020-09-29 RX ORDER — ACETAMINOPHEN 650 MG/1
650 SUPPOSITORY RECTAL EVERY 4 HOURS PRN
Status: DISCONTINUED | OUTPATIENT
Start: 2020-09-29 | End: 2020-10-01 | Stop reason: HOSPADM

## 2020-09-29 RX ORDER — LIDOCAINE HYDROCHLORIDE 10 MG/ML
INJECTION, SOLUTION INFILTRATION; PERINEURAL AS NEEDED
Status: DISCONTINUED | OUTPATIENT
Start: 2020-09-29 | End: 2020-09-29 | Stop reason: SURG

## 2020-09-29 RX ORDER — SODIUM CHLORIDE 0.9 % (FLUSH) 0.9 %
10 SYRINGE (ML) INJECTION AS NEEDED
Status: DISCONTINUED | OUTPATIENT
Start: 2020-09-29 | End: 2020-10-01 | Stop reason: HOSPADM

## 2020-09-29 RX ORDER — AMOXICILLIN 250 MG
2 CAPSULE ORAL 2 TIMES DAILY PRN
Status: DISCONTINUED | OUTPATIENT
Start: 2020-09-29 | End: 2020-10-01 | Stop reason: HOSPADM

## 2020-09-29 RX ORDER — ROCURONIUM BROMIDE 10 MG/ML
INJECTION, SOLUTION INTRAVENOUS AS NEEDED
Status: DISCONTINUED | OUTPATIENT
Start: 2020-09-29 | End: 2020-09-29 | Stop reason: SURG

## 2020-09-29 RX ORDER — NEOSTIGMINE METHYLSULFATE 1 MG/ML
INJECTION, SOLUTION INTRAVENOUS AS NEEDED
Status: DISCONTINUED | OUTPATIENT
Start: 2020-09-29 | End: 2020-09-29 | Stop reason: SURG

## 2020-09-29 RX ORDER — FAMOTIDINE 10 MG/ML
20 INJECTION, SOLUTION INTRAVENOUS
Status: COMPLETED | OUTPATIENT
Start: 2020-09-29 | End: 2020-09-29

## 2020-09-29 RX ORDER — POTASSIUM CHLORIDE 7.45 MG/ML
10 INJECTION INTRAVENOUS
Status: DISCONTINUED | OUTPATIENT
Start: 2020-09-29 | End: 2020-09-29 | Stop reason: SDUPTHER

## 2020-09-29 RX ORDER — ONDANSETRON 2 MG/ML
4 INJECTION INTRAMUSCULAR; INTRAVENOUS EVERY 6 HOURS PRN
Status: DISCONTINUED | OUTPATIENT
Start: 2020-09-29 | End: 2020-09-30

## 2020-09-29 RX ORDER — ONDANSETRON 2 MG/ML
4 INJECTION INTRAMUSCULAR; INTRAVENOUS EVERY 6 HOURS PRN
Status: DISCONTINUED | OUTPATIENT
Start: 2020-09-29 | End: 2020-10-01 | Stop reason: HOSPADM

## 2020-09-29 RX ORDER — HYDROMORPHONE HYDROCHLORIDE 1 MG/ML
0.5 INJECTION, SOLUTION INTRAMUSCULAR; INTRAVENOUS; SUBCUTANEOUS
Status: DISCONTINUED | OUTPATIENT
Start: 2020-09-29 | End: 2020-10-01 | Stop reason: HOSPADM

## 2020-09-29 RX ORDER — MAGNESIUM HYDROXIDE 1200 MG/15ML
LIQUID ORAL AS NEEDED
Status: DISCONTINUED | OUTPATIENT
Start: 2020-09-29 | End: 2020-09-29 | Stop reason: HOSPADM

## 2020-09-29 RX ORDER — BISACODYL 10 MG
10 SUPPOSITORY, RECTAL RECTAL DAILY PRN
Status: DISCONTINUED | OUTPATIENT
Start: 2020-09-29 | End: 2020-10-01 | Stop reason: HOSPADM

## 2020-09-29 RX ORDER — DOCUSATE SODIUM 100 MG/1
100 CAPSULE, LIQUID FILLED ORAL 2 TIMES DAILY PRN
Status: DISCONTINUED | OUTPATIENT
Start: 2020-09-29 | End: 2020-10-01 | Stop reason: HOSPADM

## 2020-09-29 RX ORDER — LABETALOL HYDROCHLORIDE 5 MG/ML
5 INJECTION, SOLUTION INTRAVENOUS
Status: DISCONTINUED | OUTPATIENT
Start: 2020-09-29 | End: 2020-09-29

## 2020-09-29 RX ORDER — CLINDAMYCIN PHOSPHATE 900 MG/50ML
900 INJECTION INTRAVENOUS EVERY 8 HOURS
Status: COMPLETED | OUTPATIENT
Start: 2020-09-29 | End: 2020-09-30

## 2020-09-29 RX ORDER — POTASSIUM CHLORIDE 7.45 MG/ML
10 INJECTION INTRAVENOUS
Status: DISCONTINUED | OUTPATIENT
Start: 2020-09-29 | End: 2020-10-01 | Stop reason: HOSPADM

## 2020-09-29 RX ORDER — MORPHINE SULFATE 2 MG/ML
2 INJECTION, SOLUTION INTRAMUSCULAR; INTRAVENOUS ONCE
Status: COMPLETED | OUTPATIENT
Start: 2020-09-29 | End: 2020-09-29

## 2020-09-29 RX ORDER — PROPOFOL 10 MG/ML
100 VIAL (ML) INTRAVENOUS ONCE
Status: DISCONTINUED | OUTPATIENT
Start: 2020-09-29 | End: 2020-10-01 | Stop reason: HOSPADM

## 2020-09-29 RX ORDER — DIPHENHYDRAMINE HYDROCHLORIDE 50 MG/ML
25 INJECTION INTRAMUSCULAR; INTRAVENOUS EVERY 6 HOURS PRN
Status: DISCONTINUED | OUTPATIENT
Start: 2020-09-29 | End: 2020-10-01 | Stop reason: HOSPADM

## 2020-09-29 RX ORDER — HYDROMORPHONE HYDROCHLORIDE 1 MG/ML
0.25 INJECTION, SOLUTION INTRAMUSCULAR; INTRAVENOUS; SUBCUTANEOUS ONCE
Status: COMPLETED | OUTPATIENT
Start: 2020-09-29 | End: 2020-09-29

## 2020-09-29 RX ORDER — SODIUM CHLORIDE 9 MG/ML
75 INJECTION, SOLUTION INTRAVENOUS CONTINUOUS
Status: DISCONTINUED | OUTPATIENT
Start: 2020-09-29 | End: 2020-09-30 | Stop reason: SDUPTHER

## 2020-09-29 RX ORDER — MORPHINE SULFATE 2 MG/ML
2 INJECTION, SOLUTION INTRAMUSCULAR; INTRAVENOUS
Status: DISPENSED | OUTPATIENT
Start: 2020-09-29 | End: 2020-10-01

## 2020-09-29 RX ADMIN — SODIUM CHLORIDE 75 ML/HR: 9 INJECTION, SOLUTION INTRAVENOUS at 05:34

## 2020-09-29 RX ADMIN — GLYCOPYRROLATE 0.3 MG: 0.2 INJECTION INTRAMUSCULAR; INTRAVENOUS at 14:38

## 2020-09-29 RX ADMIN — MORPHINE SULFATE 2 MG: 2 INJECTION, SOLUTION INTRAMUSCULAR; INTRAVENOUS at 02:44

## 2020-09-29 RX ADMIN — DEXAMETHASONE SODIUM PHOSPHATE 8 MG: 4 INJECTION, SOLUTION INTRAMUSCULAR; INTRAVENOUS at 13:21

## 2020-09-29 RX ADMIN — SODIUM CHLORIDE 75 ML/HR: 9 INJECTION, SOLUTION INTRAVENOUS at 16:38

## 2020-09-29 RX ADMIN — LIDOCAINE HYDROCHLORIDE 50 MG: 10 INJECTION, SOLUTION INFILTRATION; PERINEURAL at 13:17

## 2020-09-29 RX ADMIN — PROPOFOL 50 MG: 10 INJECTION, EMULSION INTRAVENOUS at 13:17

## 2020-09-29 RX ADMIN — PROPOFOL 10 MG: 10 INJECTION, EMULSION INTRAVENOUS at 01:43

## 2020-09-29 RX ADMIN — PROPOFOL 20 MG: 10 INJECTION, EMULSION INTRAVENOUS at 01:41

## 2020-09-29 RX ADMIN — CLINDAMYCIN IN 5 PERCENT DEXTROSE 900 MG: 18 INJECTION, SOLUTION INTRAVENOUS at 20:59

## 2020-09-29 RX ADMIN — DONEPEZIL HYDROCHLORIDE 5 MG: 5 TABLET, FILM COATED ORAL at 20:58

## 2020-09-29 RX ADMIN — FENTANYL CITRATE 50 MCG: 0.05 INJECTION, SOLUTION INTRAMUSCULAR; INTRAVENOUS at 15:30

## 2020-09-29 RX ADMIN — ONDANSETRON 4 MG: 2 INJECTION INTRAMUSCULAR; INTRAVENOUS at 14:38

## 2020-09-29 RX ADMIN — FAMOTIDINE 20 MG: 10 INJECTION INTRAVENOUS at 13:04

## 2020-09-29 RX ADMIN — SODIUM CHLORIDE, POTASSIUM CHLORIDE, SODIUM LACTATE AND CALCIUM CHLORIDE 9 ML/HR: 600; 310; 30; 20 INJECTION, SOLUTION INTRAVENOUS at 13:04

## 2020-09-29 RX ADMIN — HYDROCODONE BITARTRATE AND ACETAMINOPHEN 1 TABLET: 5; 325 TABLET ORAL at 16:51

## 2020-09-29 RX ADMIN — SODIUM CHLORIDE, PRESERVATIVE FREE 10 ML: 5 INJECTION INTRAVENOUS at 21:00

## 2020-09-29 RX ADMIN — HYDROMORPHONE HYDROCHLORIDE 0.25 MG: 1 INJECTION, SOLUTION INTRAMUSCULAR; INTRAVENOUS; SUBCUTANEOUS at 02:30

## 2020-09-29 RX ADMIN — NEOSTIGMINE 2 MG: 1 INJECTION INTRAVENOUS at 14:38

## 2020-09-29 RX ADMIN — PROPOFOL 10 MG: 10 INJECTION, EMULSION INTRAVENOUS at 01:42

## 2020-09-29 RX ADMIN — CLINDAMYCIN PHOSPHATE 900 MG: 18 INJECTION, SOLUTION INTRAVENOUS at 13:12

## 2020-09-29 RX ADMIN — PROPOFOL 20 MG: 10 INJECTION, EMULSION INTRAVENOUS at 01:47

## 2020-09-29 RX ADMIN — ROCURONIUM BROMIDE 30 MG: 10 INJECTION INTRAVENOUS at 13:17

## 2020-09-30 PROBLEM — A49.9 UTI (URINARY TRACT INFECTION), BACTERIAL: Status: ACTIVE | Noted: 2020-09-30

## 2020-09-30 PROBLEM — N39.0 UTI (URINARY TRACT INFECTION), BACTERIAL: Status: ACTIVE | Noted: 2020-09-30

## 2020-09-30 LAB
ANION GAP SERPL CALCULATED.3IONS-SCNC: 11 MMOL/L (ref 5–15)
BACTERIA UR QL AUTO: ABNORMAL /HPF
BASOPHILS # BLD AUTO: 0.02 10*3/MM3 (ref 0–0.2)
BASOPHILS NFR BLD AUTO: 0.1 % (ref 0–1.5)
BILIRUB UR QL STRIP: NEGATIVE
BUN SERPL-MCNC: 14 MG/DL (ref 8–23)
BUN/CREAT SERPL: 23 (ref 7–25)
CALCIUM SPEC-SCNC: 7.9 MG/DL (ref 8.2–9.6)
CHLORIDE SERPL-SCNC: 98 MMOL/L (ref 98–107)
CLARITY UR: ABNORMAL
CO2 SERPL-SCNC: 23 MMOL/L (ref 22–29)
COLOR UR: YELLOW
CREAT SERPL-MCNC: 0.61 MG/DL (ref 0.57–1)
DEPRECATED RDW RBC AUTO: 48.9 FL (ref 37–54)
EOSINOPHIL # BLD AUTO: 0 10*3/MM3 (ref 0–0.4)
EOSINOPHIL NFR BLD AUTO: 0 % (ref 0.3–6.2)
ERYTHROCYTE [DISTWIDTH] IN BLOOD BY AUTOMATED COUNT: 14.8 % (ref 12.3–15.4)
GFR SERPL CREATININE-BSD FRML MDRD: 91 ML/MIN/1.73
GLUCOSE SERPL-MCNC: 130 MG/DL (ref 65–99)
GLUCOSE UR STRIP-MCNC: NEGATIVE MG/DL
HCT VFR BLD AUTO: 27.9 % (ref 34–46.6)
HGB BLD-MCNC: 9.2 G/DL (ref 12–15.9)
HGB UR QL STRIP.AUTO: ABNORMAL
HYALINE CASTS UR QL AUTO: ABNORMAL /LPF
IMM GRANULOCYTES # BLD AUTO: 0.14 10*3/MM3 (ref 0–0.05)
IMM GRANULOCYTES NFR BLD AUTO: 0.9 % (ref 0–0.5)
KETONES UR QL STRIP: ABNORMAL
LEUKOCYTE ESTERASE UR QL STRIP.AUTO: ABNORMAL
LYMPHOCYTES # BLD AUTO: 0.77 10*3/MM3 (ref 0.7–3.1)
LYMPHOCYTES NFR BLD AUTO: 5 % (ref 19.6–45.3)
MCH RBC QN AUTO: 30.8 PG (ref 26.6–33)
MCHC RBC AUTO-ENTMCNC: 33 G/DL (ref 31.5–35.7)
MCV RBC AUTO: 93.3 FL (ref 79–97)
MONOCYTES # BLD AUTO: 1.03 10*3/MM3 (ref 0.1–0.9)
MONOCYTES NFR BLD AUTO: 6.7 % (ref 5–12)
NEUTROPHILS NFR BLD AUTO: 13.4 10*3/MM3 (ref 1.7–7)
NEUTROPHILS NFR BLD AUTO: 87.3 % (ref 42.7–76)
NITRITE UR QL STRIP: POSITIVE
NRBC BLD AUTO-RTO: 0 /100 WBC (ref 0–0.2)
PH UR STRIP.AUTO: 6 [PH] (ref 5–8)
PLATELET # BLD AUTO: 393 10*3/MM3 (ref 140–450)
PMV BLD AUTO: 9.1 FL (ref 6–12)
POTASSIUM SERPL-SCNC: 3.9 MMOL/L (ref 3.5–5.2)
PROT UR QL STRIP: ABNORMAL
RBC # BLD AUTO: 2.99 10*6/MM3 (ref 3.77–5.28)
RBC # UR: ABNORMAL /HPF
REF LAB TEST METHOD: ABNORMAL
SODIUM SERPL-SCNC: 132 MMOL/L (ref 136–145)
SP GR UR STRIP: 1.01 (ref 1–1.03)
SQUAMOUS #/AREA URNS HPF: ABNORMAL /HPF
UROBILINOGEN UR QL STRIP: ABNORMAL
WBC # BLD AUTO: 15.36 10*3/MM3 (ref 3.4–10.8)
WBC UR QL AUTO: ABNORMAL /HPF

## 2020-09-30 PROCEDURE — 87077 CULTURE AEROBIC IDENTIFY: CPT | Performed by: ORTHOPAEDIC SURGERY

## 2020-09-30 PROCEDURE — 97530 THERAPEUTIC ACTIVITIES: CPT

## 2020-09-30 PROCEDURE — 81001 URINALYSIS AUTO W/SCOPE: CPT | Performed by: ORTHOPAEDIC SURGERY

## 2020-09-30 PROCEDURE — 25010000002 ENOXAPARIN PER 10 MG: Performed by: ORTHOPAEDIC SURGERY

## 2020-09-30 PROCEDURE — 99233 SBSQ HOSP IP/OBS HIGH 50: CPT | Performed by: INTERNAL MEDICINE

## 2020-09-30 PROCEDURE — 97535 SELF CARE MNGMENT TRAINING: CPT

## 2020-09-30 PROCEDURE — 25010000002 CEFTRIAXONE PER 250 MG: Performed by: INTERNAL MEDICINE

## 2020-09-30 PROCEDURE — 87086 URINE CULTURE/COLONY COUNT: CPT | Performed by: ORTHOPAEDIC SURGERY

## 2020-09-30 PROCEDURE — 87186 SC STD MICRODIL/AGAR DIL: CPT | Performed by: ORTHOPAEDIC SURGERY

## 2020-09-30 PROCEDURE — 97166 OT EVAL MOD COMPLEX 45 MIN: CPT

## 2020-09-30 PROCEDURE — 80048 BASIC METABOLIC PNL TOTAL CA: CPT | Performed by: ORTHOPAEDIC SURGERY

## 2020-09-30 PROCEDURE — 97162 PT EVAL MOD COMPLEX 30 MIN: CPT

## 2020-09-30 PROCEDURE — 85025 COMPLETE CBC W/AUTO DIFF WBC: CPT | Performed by: ORTHOPAEDIC SURGERY

## 2020-09-30 RX ADMIN — SERTRALINE HYDROCHLORIDE 25 MG: 25 TABLET ORAL at 08:50

## 2020-09-30 RX ADMIN — ACETAMINOPHEN 650 MG: 325 TABLET, FILM COATED ORAL at 13:41

## 2020-09-30 RX ADMIN — OXYBUTYNIN CHLORIDE 10 MG: 10 TABLET, EXTENDED RELEASE ORAL at 08:50

## 2020-09-30 RX ADMIN — SODIUM CHLORIDE, PRESERVATIVE FREE 10 ML: 5 INJECTION INTRAVENOUS at 21:28

## 2020-09-30 RX ADMIN — DONEPEZIL HYDROCHLORIDE 5 MG: 5 TABLET, FILM COATED ORAL at 21:28

## 2020-09-30 RX ADMIN — SODIUM CHLORIDE 75 ML/HR: 9 INJECTION, SOLUTION INTRAVENOUS at 01:36

## 2020-09-30 RX ADMIN — ACETAMINOPHEN 650 MG: 325 TABLET, FILM COATED ORAL at 08:54

## 2020-09-30 RX ADMIN — AMLODIPINE BESYLATE 2.5 MG: 2.5 TABLET ORAL at 08:50

## 2020-09-30 RX ADMIN — ACETAMINOPHEN 650 MG: 325 TABLET, FILM COATED ORAL at 21:28

## 2020-09-30 RX ADMIN — CLINDAMYCIN IN 5 PERCENT DEXTROSE 900 MG: 18 INJECTION, SOLUTION INTRAVENOUS at 05:47

## 2020-09-30 RX ADMIN — LEVOTHYROXINE SODIUM 100 MCG: 100 TABLET ORAL at 05:47

## 2020-09-30 RX ADMIN — DOCUSATE SODIUM 100 MG: 100 CAPSULE, LIQUID FILLED ORAL at 08:54

## 2020-09-30 RX ADMIN — ENOXAPARIN SODIUM 30 MG: 30 INJECTION SUBCUTANEOUS at 08:50

## 2020-09-30 RX ADMIN — CEFTRIAXONE SODIUM 1 G: 1 INJECTION, POWDER, FOR SOLUTION INTRAMUSCULAR; INTRAVENOUS at 08:50

## 2020-10-01 ENCOUNTER — READMISSION MANAGEMENT (OUTPATIENT)
Dept: CALL CENTER | Facility: HOSPITAL | Age: 85
End: 2020-10-01

## 2020-10-01 VITALS
HEART RATE: 85 BPM | WEIGHT: 100 LBS | SYSTOLIC BLOOD PRESSURE: 129 MMHG | OXYGEN SATURATION: 91 % | TEMPERATURE: 97.9 F | DIASTOLIC BLOOD PRESSURE: 65 MMHG | RESPIRATION RATE: 16 BRPM | BODY MASS INDEX: 16.66 KG/M2 | HEIGHT: 65 IN

## 2020-10-01 PROBLEM — D72.829 LEUKOCYTOSIS: Status: RESOLVED | Noted: 2020-09-17 | Resolved: 2020-10-01

## 2020-10-01 PROBLEM — S72.032A: Status: RESOLVED | Noted: 2020-09-17 | Resolved: 2020-10-01

## 2020-10-01 PROBLEM — A49.9 UTI (URINARY TRACT INFECTION), BACTERIAL: Status: RESOLVED | Noted: 2020-09-30 | Resolved: 2020-10-01

## 2020-10-01 PROBLEM — N39.0 UTI (URINARY TRACT INFECTION), BACTERIAL: Status: RESOLVED | Noted: 2020-09-30 | Resolved: 2020-10-01

## 2020-10-01 PROBLEM — E87.1 HYPONATREMIA: Status: RESOLVED | Noted: 2020-09-29 | Resolved: 2020-10-01

## 2020-10-01 PROBLEM — S73.005A HIP DISLOCATION, LEFT, INITIAL ENCOUNTER (HCC): Status: RESOLVED | Noted: 2020-09-29 | Resolved: 2020-10-01

## 2020-10-01 PROBLEM — E87.6 HYPOKALEMIA: Status: RESOLVED | Noted: 2020-09-29 | Resolved: 2020-10-01

## 2020-10-01 LAB
ANION GAP SERPL CALCULATED.3IONS-SCNC: 8 MMOL/L (ref 5–15)
BUN SERPL-MCNC: 16 MG/DL (ref 8–23)
BUN/CREAT SERPL: 30.2 (ref 7–25)
CALCIUM SPEC-SCNC: 7.8 MG/DL (ref 8.2–9.6)
CHLORIDE SERPL-SCNC: 98 MMOL/L (ref 98–107)
CO2 SERPL-SCNC: 25 MMOL/L (ref 22–29)
CREAT SERPL-MCNC: 0.53 MG/DL (ref 0.57–1)
DEPRECATED RDW RBC AUTO: 51.3 FL (ref 37–54)
ERYTHROCYTE [DISTWIDTH] IN BLOOD BY AUTOMATED COUNT: 15.3 % (ref 12.3–15.4)
GFR SERPL CREATININE-BSD FRML MDRD: 107 ML/MIN/1.73
GLUCOSE SERPL-MCNC: 103 MG/DL (ref 65–99)
HCT VFR BLD AUTO: 23.8 % (ref 34–46.6)
HGB BLD-MCNC: 7.9 G/DL (ref 12–15.9)
MCH RBC QN AUTO: 31.2 PG (ref 26.6–33)
MCHC RBC AUTO-ENTMCNC: 33.2 G/DL (ref 31.5–35.7)
MCV RBC AUTO: 94.1 FL (ref 79–97)
PLATELET # BLD AUTO: 375 10*3/MM3 (ref 140–450)
PMV BLD AUTO: 9.2 FL (ref 6–12)
POTASSIUM SERPL-SCNC: 3 MMOL/L (ref 3.5–5.2)
RBC # BLD AUTO: 2.53 10*6/MM3 (ref 3.77–5.28)
SODIUM SERPL-SCNC: 131 MMOL/L (ref 136–145)
WBC # BLD AUTO: 11.83 10*3/MM3 (ref 3.4–10.8)

## 2020-10-01 PROCEDURE — 97116 GAIT TRAINING THERAPY: CPT

## 2020-10-01 PROCEDURE — 80048 BASIC METABOLIC PNL TOTAL CA: CPT | Performed by: INTERNAL MEDICINE

## 2020-10-01 PROCEDURE — 25010000002 CEFTRIAXONE PER 250 MG: Performed by: INTERNAL MEDICINE

## 2020-10-01 PROCEDURE — 99239 HOSP IP/OBS DSCHRG MGMT >30: CPT | Performed by: INTERNAL MEDICINE

## 2020-10-01 PROCEDURE — 97110 THERAPEUTIC EXERCISES: CPT

## 2020-10-01 PROCEDURE — 85027 COMPLETE CBC AUTOMATED: CPT | Performed by: INTERNAL MEDICINE

## 2020-10-01 PROCEDURE — 25010000002 ENOXAPARIN PER 10 MG: Performed by: ORTHOPAEDIC SURGERY

## 2020-10-01 RX ORDER — HYDROCODONE BITARTRATE AND ACETAMINOPHEN 5; 325 MG/1; MG/1
1 TABLET ORAL EVERY 4 HOURS PRN
Qty: 18 TABLET | Refills: 0 | Status: SHIPPED | OUTPATIENT
Start: 2020-10-01 | End: 2020-10-04

## 2020-10-01 RX ORDER — CEFUROXIME AXETIL 250 MG/1
250 TABLET ORAL 2 TIMES DAILY
Qty: 10 TABLET | Refills: 0
Start: 2020-10-01 | End: 2020-10-06

## 2020-10-01 RX ORDER — GABAPENTIN 100 MG/1
100 CAPSULE ORAL NIGHTLY
Qty: 3 CAPSULE | Refills: 0 | Status: SHIPPED | OUTPATIENT
Start: 2020-10-01

## 2020-10-01 RX ADMIN — ENOXAPARIN SODIUM 30 MG: 30 INJECTION SUBCUTANEOUS at 08:03

## 2020-10-01 RX ADMIN — OXYBUTYNIN CHLORIDE 10 MG: 10 TABLET, EXTENDED RELEASE ORAL at 08:02

## 2020-10-01 RX ADMIN — LEVOTHYROXINE SODIUM 100 MCG: 100 TABLET ORAL at 06:00

## 2020-10-01 RX ADMIN — BISACODYL 10 MG: 10 SUPPOSITORY RECTAL at 08:02

## 2020-10-01 RX ADMIN — SODIUM CHLORIDE, PRESERVATIVE FREE 10 ML: 5 INJECTION INTRAVENOUS at 08:02

## 2020-10-01 RX ADMIN — AMLODIPINE BESYLATE 2.5 MG: 2.5 TABLET ORAL at 08:02

## 2020-10-01 RX ADMIN — CEFTRIAXONE SODIUM 1 G: 1 INJECTION, POWDER, FOR SOLUTION INTRAMUSCULAR; INTRAVENOUS at 08:27

## 2020-10-01 RX ADMIN — ACETAMINOPHEN 650 MG: 325 TABLET, FILM COATED ORAL at 06:00

## 2020-10-01 RX ADMIN — SERTRALINE HYDROCHLORIDE 25 MG: 25 TABLET ORAL at 08:02

## 2020-10-01 RX ADMIN — ACETAMINOPHEN 650 MG: 325 TABLET, FILM COATED ORAL at 11:44

## 2020-10-01 NOTE — THERAPY TREATMENT NOTE
Patient Name: Darline Reed  : 1926    MRN: [id number]                              Today's Date: 10/1/2020       Admit Date: 2020    Visit Dx:     ICD-10-CM ICD-9-CM   1. Dislocation of left hip, initial encounter (CMS/HCC)  S73.005A 835.00   2. Femoral loosening of prosthetic left hip, initial encounter (CMS/HCC)  T84.031A 996.41     V43.64   3. 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)   • Gastroesophageal reflux disease without esophagitis   • Hypothyroidism (acquired)   • Macular degeneration (senile) of retina   • Fall   • Dementia (CMS/HCC)     Past Medical History:   Diagnosis Date   • Abnormal PFTs 2010    Restriction and obstruction on PFT/s   • Arthritis    • Asthma    • Cachexia (CMS/HCC) 3/19/2019   • COPD (chronic obstructive pulmonary disease) (CMS/HCC)    • Dementia (CMS/HCC)    • Diverticulitis    • Diverticulosis of colon without hemorrhage 3/19/2019   • Dyspnea on exertion 3/19/2019   • Falls frequently 3/19/2019   • GERD (gastroesophageal reflux disease)    • Hoarseness 3/19/2019   • Hypercholesteremia    • Hypertension 2016   • Hypothyroid    • Intractable acute post-traumatic headache 3/19/2019   • Intractable pain 2016   • Migraine 3/19/2019   • Osteoporosis    • Positive PPD    • Right knee meniscal tear    • Right knee sprain     conservative   • Scoliosis    • Weight  loss 6/19/2016     Past Surgical History:   Procedure Laterality Date   • BLEPHAROPLASTY     • CATARACT EXTRACTION  1999   • HIP HEMIARTHROPLASTY Left 9/18/2020    Procedure: HIP HEMIARTHROPLASTY LEFT;  Surgeon: Clarence Hill MD;  Location: [facility] OR;  Service: Orthopedics;  Laterality: Left;   • HIP HEMIARTHROPLASTY Left 9/29/2020    Procedure: HIP HEMIARTHROPLASTY REVISION LEFT;  Surgeon: Clarence Hill MD;  Location: [facility] OR;  Service: Orthopedics;  Laterality: Left;   • TONSILLECTOMY AND ADENOIDECTOMY       General Information     Row Name 10/01/20 1100          Physical Therapy Time and Intention    Document Type  therapy note (daily note)  -MB     Mode of Treatment  physical therapy  -MB     Row Name 10/01/20 1100          General Information    Patient Profile Reviewed  yes  -MB     Existing Precautions/Restrictions  fall;left;hip, posterior;other (see comments) hip abduction pillow, KI on AAT, macular degeneration  -MB     Row Name 10/01/20 1100          Cognition    Orientation Status (Cognition)  oriented to;person;place;time  -MB     Row Name 10/01/20 1100          Safety Issues, Functional Mobility    Safety Issues Affecting Function (Mobility)  insight into deficits/self-awareness;judgment;positioning of assistive device;problem-solving;safety precaution awareness;safety precautions follow-through/compliance;sequencing abilities  -MB     Impairments Affecting Function (Mobility)  balance;cognition;endurance/activity tolerance;pain;visual/perceptual;strength  -MB       User Key  (r) = Recorded By, (t) = Taken By, (c) = Cosigned By    Initials Name Provider Type    Yesica Redding, PT Physical Therapist        Mobility     Row Name 10/01/20 1100          Bed Mobility    Bed Mobility  sit-supine  -MB     Scooting/Bridging Mayes (Bed Mobility)  dependent (less than 25% patient effort);2 person assist;verbal cues  -MB     Supine-Sit Mayes (Bed Mobility)  maximum  assist (25% patient effort);2 person assist;verbal cues  -MB     Sit-Supine [place] (Bed Mobility)  maximum assist (25% patient effort);2 person assist;verbal cues  -MB     Assistive Device (Bed Mobility)  draw sheet;head of bed elevated  -MB     Comment (Bed Mobility)  KI donned upon arrival.  Pt. required assist to manage L LE and trunk for supine <>sit, VCs for sequencing and upright posture in sitting, and increased time/effort/encouragement.  Pt. leaning posteriorly upon sitting, requiring cues for safety and anterior weight shift.  -MB     Row Name 10/01/20 1100          Transfers    Comment (Transfers)  Pt. transferred STS 3x from EOB w/ VCs/tactile cues for set up, safe hand placement, anterior weight shift, and upright posture in standing.  -MB     Row Name 10/01/20 1100          Sit-Stand Transfer    Sit-Stand [place] (Transfers)  moderate assist (50% patient effort);2 person assist;verbal cues;nonverbal cues (demo/gesture)  -MB     Assistive Device (Sit-Stand Transfers)  walker, front-wheeled  -MB     Row Name 10/01/20 1100          Gait/Stairs (Locomotion)    Latimer Level (Gait)  moderate assist (50% patient effort);2 person assist;verbal cues;nonverbal cues (demo/gesture)  -MB     Assistive Device (Gait)  walker, front-wheeled  -MB     Distance in Feet (Gait)  2  -MB     Deviations/Abnormal Patterns (Gait)  bilateral deviations;rainer decreased;weight shifting decreased;base of support, narrow;stride length decreased  -MB     Bilateral Gait Deviations  forward flexed posture  -MB     Comment (Gait/Stairs)  Pt. amb 2ft w/ step to gait pattern and increased forward flexion.  Pt. required assist to shift weight and VCs/tactile cues for upright posture and step sequence.  Gait distance limited by weakness/fear of falling.  -MB     Row Name 10/01/20 1100          Mobility    Extremity Weight-bearing Status  left lower extremity  -MB     Left Lower Extremity (Weight-bearing Status)   weight-bearing as tolerated (WBAT)  -MB       User Key  (r) = Recorded By, (t) = Taken By, (c) = Cosigned By    Initials Name Provider Type    Yesica Redding, PT Physical Therapist        Obj/Interventions     Row Name 10/01/20 1100          Motor Skills    Therapeutic Exercise  hip;knee;ankle  -MB     Row Name 10/01/20 1100          Hip (Therapeutic Exercise)    Hip (Therapeutic Exercise)  AAROM (active assistive range of motion);strengthening exercise;isometric exercises  -MB     Hip AAROM (Therapeutic Exercise)  left;aBduction;10 repetitions  -MB     Hip Isometrics (Therapeutic Exercise)  bilateral;gluteal sets;10 repetitions  -MB     Hip Strengthening (Therapeutic Exercise)  left;heel slides;10 repetitions  -MB     Row Name 10/01/20 1100          Knee (Therapeutic Exercise)    Knee (Therapeutic Exercise)  AROM (active range of motion);strengthening exercise;isometric exercises  -MB     Knee Isometrics (Therapeutic Exercise)  bilateral;quad sets;10 repetitions  -MB     Knee Strengthening (Therapeutic Exercise)  bilateral;LAQ (long arc quad);10 repetitions min A LLE LAQ  -MB     Row Name 10/01/20 1100          Ankle (Therapeutic Exercise)    Ankle (Therapeutic Exercise)  AROM (active range of motion)  -MB     Ankle AROM (Therapeutic Exercise)  bilateral;dorsiflexion;plantarflexion;10 repetitions  -MB     Row Name 10/01/20 1100          Balance    Static Sitting Balance  mild impairment  -MB     Dynamic Sitting Balance  mild impairment  -MB     Static Standing Balance  moderate impairment  -MB     Dynamic Standing Balance  severe impairment  -MB     Balance Interventions  standing;sit to stand;weight shifting activity  -MB       User Key  (r) = Recorded By, (t) = Taken By, (c) = Cosigned By    Initials Name Provider Type    Yesica Redding, PT Physical Therapist        Goals/Plan    No documentation.       Clinical Impression     Row Name 10/01/20 1100          Pain Scale: Numbers Pre/Post-Treatment     Pretreatment Pain Rating  5/10  -MB     Posttreatment Pain Rating  5/10  -MB     Pain Location - Side  Left  -MB     Pain Location - Orientation  generalized  -MB     Pain Location  hip  -MB     Pain Intervention(s)  Medication (See MAR);Ambulation/increased activity;Repositioned  -MB     Row Name 10/01/20 1100          Plan of Care Review    Plan of Care Reviewed With  patient;grandchild(teresa)  -MB     Progress  improving  -MB     Outcome Summary  Pt. demonstrates min. improvement w/ mobility; pt. continues to be limited by weakness and fear of falling.  Pt. performed bed mobility w/ max A x 2, STS transfers x 3 reps w/ mod A x 2, and ambulated 2ft w/ RW and mod A x 2.  Pt. participated in supine LE TherEx w/ good effort.  PT continues to recommend SNF rehab at D/C for best outcome.  -MB     Row Name 10/01/20 1100          Vital Signs    Pre Systolic BP Rehab  -- VSS.  RN cleared for PT.  -MB     Row Name 10/01/20 1100          Positioning and Restraints    Pre-Treatment Position  in bed  -MB     Post Treatment Position  bed per RN request  -MB     In Bed  notified nsg;supine;call light within reach;encouraged to call for assist;exit alarm on;with family/caregiver;SCD pump applied;ABD pillow;L knee immobilizer  -MB       User Key  (r) = Recorded By, (t) = Taken By, (c) = Cosigned By    Initials Name Provider Type    Yesica Redding, PT Physical Therapist        Outcome Measures     Row Name 10/01/20 1100          How much help from another person do you currently need...    Turning from your back to your side while in flat bed without using bedrails?  2  -MB     Moving from lying on back to sitting on the side of a flat bed without bedrails?  2  -MB     Moving to and from a bed to a chair (including a wheelchair)?  2  -MB     Standing up from a chair using your arms (e.g., wheelchair, bedside chair)?  2  -MB     Climbing 3-5 steps with a railing?  1  -MB     To walk in hospital room?  2  -MB     AM-PAC 6  Clicks Score (PT)  11  -MB       User Key  (r) = Recorded By, (t) = Taken By, (c) = Cosigned By    Initials Name Provider Type    Yesica Redding, PT Physical Therapist        Physical Therapy Education                 Title: PT OT SLP Therapies (In Progress)     Topic: Physical Therapy (In Progress)     Point: Mobility training (In Progress)     Learning Progress Summary           Patient Acceptance, E,D, NR by AA at 9/30/2020 0911   Family Acceptance, E,TB, VU by OD at 10/1/2020 1257    Acceptance, E,D, NR by AA at 9/30/2020 0911                   Point: Home exercise program (Done)     Learning Progress Summary           Family Acceptance, E,TB, VU by OD at 10/1/2020 1257                   Point: Body mechanics (In Progress)     Learning Progress Summary           Patient Acceptance, E,D, NR by AA at 9/30/2020 0911   Family Acceptance, E,TB, VU by OD at 10/1/2020 1257    Acceptance, E,D, NR by AA at 9/30/2020 0911                   Point: Precautions (In Progress)     Learning Progress Summary           Patient Acceptance, E,D, NR by AA at 9/30/2020 0911   Family Acceptance, E,TB, VU by OD at 10/1/2020 1257    Acceptance, E,D, NR by AA at 9/30/2020 0911                               User Key     Initials Effective Dates Name Provider Type Discipline    AA 04/02/18 -  Rox Fletcher, PT Physical Therapist PT    OD 09/30/20 -  Deborah Prajapati, RN Registered Nurse Nurse              PT Recommendation and Plan     Plan of Care Reviewed With: patient, grandchild(teresa)  Progress: improving  Outcome Summary: Pt. demonstrates min. improvement w/ mobility; pt. continues to be limited by weakness and fear of falling.  Pt. performed bed mobility w/ max A x 2, STS transfers x 3 reps w/ mod A x 2, and ambulated 2ft w/ RW and mod A x 2.  Pt. participated in supine LE TherEx w/ good effort.  PT continues to recommend SNF rehab at D/C for best outcome.     Time Calculation:   PT Charges     Row Name 10/01/20 1100              Time Calculation    PT Received On  10/01/20  -MB      PT Goal Re-Cert Due Date  10/10/20  -MB         Time Calculation- PT    Total Timed Code Minutes- PT  42 minute(s)  -MB         Timed Charges    40799 - PT Therapeutic Exercise Minutes  30  -MB      81835 - Gait Training Minutes   12  -MB        User Key  (r) = Recorded By, (t) = Taken By, (c) = Cosigned By    Initials Name Provider Type    Yesica Redding, PT Physical Therapist        Therapy Charges for Today     Code Description Service Date Service Provider Modifiers Qty    51433594641 HC PT THER PROC EA 15 MIN 10/1/2020 Yesica Moreno, PT GP 2    54388377604 HC GAIT TRAINING EA 15 MIN 10/1/2020 Yesica Moreno, PT GP 1    02241333291 HC PT THER SUPP EA 15 MIN 10/1/2020 Yesica Moreno, PT GP 2          PT G-Codes  Outcome Measure Options: AM-PAC 6 Clicks Daily Activity (OT)  AM-PAC 6 Clicks Score (PT): 11  AM-PAC 6 Clicks Score (OT): 12    Yescia Moreno PT  10/1/2020

## 2020-10-01 NOTE — DISCHARGE SUMMARY
[facility] Medicine Services  DISCHARGE SUMMARY    Patient Name: Darline Reed  : 1926  MRN: [id number]    Date of Admission: 2020  7:39 PM  Date of Discharge:  10/1/2020  Primary Care Physician: Sandra Adkins MD    Consults     Date and Time Order Name Status Description    2020 0515 Inpatient Orthopedic Surgery Consult Completed           Hospital Course     Presenting Problem:   Hip dislocation, left, initial encounter (CMS/[facility]) [S73.005A]  Hip dislocation, left, initial encounter (CMS/[facility]) [S73.005A]    Active Hospital Problems    Diagnosis  POA   • Dementia (CMS/[facility]) [F03.90]  Yes   • At high risk for falls [Z91.81]  Not Applicable   • Benign essential hypertension [I10]  Yes   • COPD (chronic obstructive pulmonary disease) with chronic bronchitis (CMS/[facility]) [J44.9]  Yes   • Gastroesophageal reflux disease without esophagitis [K21.9]  Yes   • Hypothyroidism (acquired) [E03.9]  Yes   • Hypercholesteremia [E78.00]  Yes      Resolved Hospital Problems    Diagnosis Date Resolved POA   • **Hip dislocation, left, initial encounter (CMS/[facility]) [S73.005A] 10/01/2020 Yes   • UTI (urinary tract infection), bacterial [N39.0, A49.9] 10/01/2020 Yes   • Hyponatremia [E87.1] 10/01/2020 Yes   • Hypokalemia [E87.6] 10/01/2020 Yes   • Closed transcervical fracture of left femur (CMS/[facility]) [S72.032A] 10/01/2020 Yes   • Leukocytosis [D72.829] 10/01/2020 Yes          Hospital Course:  Darline Reed is a 89 y.o. female w/ a hx of HTN, HLD, COPD, hypothyroidism, GERD and dementia who presented to the ED w/ c/o hip pain. Pt evaluated in the ED. XRAYS c/w dislocation. Reduction attempted in ED but unsuccessful. Underwent revision by Dr. Hill .      Assessment & Plan     **Left hip hemiarthroplasty dislocation w/ periprosthetic femoral fracture  **S/p Left hip hemiarthroplasty on  2/2 fracture/fall   -Upon arrival orthopedic surgery was consulted. She is S/P revision by Dr. Hill .  Tolerated procedure well and had no post operative difficulty.  -Participated w/ post op PT/OT without difficulty. Back to [facility] for completion of rehab.  -Pain control. Lovenox for DVT ppx for 4 weeks. F/U Dr. Hill in 3 weeks     **Bacterial UTI  **Leukocytosis  -Her repeat urine culture pending. Will switch to PO ceftin x 5 more days at d/c based on prior c+s.       **Hyponatremia   -Suspect due to volume depletion. Improved after IV fluids.     Post operative ABL anemia  -Mild and expected drop in Hb. No evidence of bleeding, hematoma. Plan to recheck CBC in 3-5 days at [facility].    Discharge Follow Up Recommendations for outpatient labs/diagnostics:   Recheck CBC in 3-5 days   Dr. Hill in 3-4 weeks    Day of Discharge     HPI: Lying in bed with granddaughter in room. Patient having some pain and asking for pain medication. Slept well.     Review of Systems  Gen- No fevers, chills  CV- No chest pain, palpitations  Resp- No cough, dyspnea  GI- No N/V/D, abd pain    Vital Signs:   Temp:  [97.3 °F (36.3 °C)-98.5 °F (36.9 °C)] 98.5 °F (36.9 °C)  Heart Rate:  [] 75  Resp:  [16-17] 16  BP: (117-146)/(49-75) 146/57     Physical Exam:  Constitutional: No acute distress, awake, alert  HENT: NCAT, mucous membranes moist  Respiratory: Clear to auscultation bilaterally, respiratory effort normal   Cardiovascular: RRR, no murmurs, rubs, or gallops, palpable pedal pulses bilaterally  Gastrointestinal: Positive bowel sounds, soft, nontender, nondistended, thin  Musculoskeletal: Left hip incision dressed c/d/i.  Psychiatric: Appropriate affect, cooperative  Neurologic: Oriented x 3, strength symmetric in all extremities, Cranial Nerves grossly intact to confrontation, speech clear  Skin: No rashes    Pertinent  and/or Most Recent Results     Results from last 7 days   Lab Units 10/01/20  0719 09/30/20  0515 09/29/20  1711 09/29/20  0630 09/28/20  2217   WBC 10*3/mm3 11.83* 15.36*  --  15.60* 11.99*   HEMOGLOBIN g/dL 7.9* 9.2*   --  10.9* 10.6*   HEMATOCRIT % 23.8* 27.9*  --  33.9* 31.7*   PLATELETS 10*3/mm3 375 393  --  436 414   SODIUM mmol/L 131* 132* 131* 129* 129*  128*   POTASSIUM mmol/L 3.0* 3.9 3.7 3.2* 3.2*  3.2*   CHLORIDE mmol/L 98 98  --  95* 91*  90*   CO2 mmol/L 25.0 23.0  --  20.0* 23.0  26.0   BUN mg/dL 16 14  --  9 9  10   CREATININE mg/dL 0.53* 0.61  --  0.48* 0.58  0.54*   GLUCOSE mg/dL 103* 130*  --  73 75  102*   CALCIUM mg/dL 7.8* 7.9*  --  8.0* 8.4  8.4     Results from last 7 days   Lab Units 09/29/20  0631 09/28/20  2217   BILIRUBIN mg/dL  --  0.6   ALK PHOS U/L  --  72   ALT (SGPT) U/L  --  14   AST (SGOT) U/L  --  42*   PROTIME Seconds 13.5  --    INR  1.06  --    APTT seconds 34.9  --            Invalid input(s): TG, LDLCALC, LDLREALC        Brief Urine Lab Results  (Last result in the past 365 days)      Color   Clarity   Blood   Leuk Est   Nitrite   Protein   CREAT   Urine HCG        09/30/20 0030 Yellow Cloudy Small (1+) Moderate (2+) Positive Trace               Microbiology Results Abnormal     Procedure Component Value - Date/Time    COVID PRE-OP / PRE-PROCEDURE SCREENING ORDER (NO ISOLATION) - Swab, Nasopharynx [[id number]] Collected: 09/29/20 0257    Lab Status: Final result Specimen: Swab from Nasopharynx Updated: 09/29/20 1441    Narrative:      The following orders were created for panel order COVID PRE-OP / PRE-PROCEDURE SCREENING ORDER (NO ISOLATION) - Swab, Nasopharynx.  Procedure                               Abnormality         Status                     ---------                               -----------         ------                     LEONARDA-19JESSICA NP ...[[id number]]                      Final result                 Please view results for these tests on the individual orders.    COVID-19,[facility], NP SWAB IN ALTHIA VIRAL TRANSPORT MEDIA 24-30 HR TAT - Swab, Nasopharynx [[id number]] Collected: 09/29/20 0257    Lab Status: Final result Specimen: Swab from Nasopharynx Updated:  09/29/20 1441     SARS-CoV-2 FRANCO Not Detected    COVID PRE-OP / PRE-PROCEDURE SCREENING ORDER (NO ISOLATION) - Swab, Nasal Cavity [[id number]]  (Normal) Collected: 09/29/20 1102    Lab Status: Final result Specimen: Swab from Nasal Cavity Updated: 09/29/20 1222    Narrative:      The following orders were created for panel order COVID PRE-OP / PRE-PROCEDURE SCREENING ORDER (NO ISOLATION) - Swab, Nasal Cavity.  Procedure                               Abnormality         Status                     ---------                               -----------         ------                     COVID-19, [facility] IN-HOUS...[[id number]]  Normal              Final result                 Please view results for these tests on the individual orders.    COVID-19, [facility]-HOUSE,NP Swab (NO TRANSPORT MEDIA) 2 HR TAT - Swab, Nasal Cavity [[id number]]  (Normal) Collected: 09/29/20 1102    Lab Status: Final result Specimen: Swab from Nasal Cavity Updated: 09/29/20 1222     COVID19 Not Detected    Narrative:      Fact sheet for providers: https://www.fda.gov/media/023405/download     Fact sheet for patients: https://www.fda.gov/media/194116/download          Imaging Results (All)     Procedure Component Value Units Date/Time    XR Hip With or Without Pelvis 2 - 3 View Left [[id number]] Collected: 09/29/20 1546     Updated: 09/29/20 1550    Narrative:      EXAMINATION: XR HIP W OR WO PELVIS 2-3 VIEW LEFT-      INDICATION: Post-Op Hip Arthroplasty; S73.005A-Unspecified dislocation  of left hip, initial encounter; T84.031A-Mechanical loosening of  internal left hip prosthetic joint, initial encounter      COMPARISON: NONE     FINDINGS: Left hip arthroplasty in place without evidence of hardware  complication. No acute fracture or dislocation elsewhere.       Impression:      Left hip arthroplasty in place without evidence of hardware  complication. No acute fracture or dislocation elsewhere.     This report was finalized on 9/29/2020 3:47 PM by  Leonardo Bhat.       XR Chest 1 View [[id number]] Collected: 09/29/20 0811     Updated: 09/29/20 0815    Narrative:      EXAMINATION: XR CHEST 1 VW-      INDICATION: pre-op; S73.005A-Unspecified dislocation of left hip,  initial encounter; T84.031A-Mechanical loosening of internal left hip  prosthetic joint, initial encounter      COMPARISON: 9/18/2020     FINDINGS: Normal heart and mediastinal contours. No focal airspace  consolidation. Unchanged hyperexpansion compatible with COPD. No  pneumothorax or significant effusion.       Impression:      Normal heart and mediastinal contours. No focal airspace  consolidation. Unchanged hyperexpansion compatible with COPD. No  pneumothorax or significant effusion.     This report was finalized on 9/29/2020 8:12 AM by Leonardo Bhat.       XR Femur 2 View Left [[id number]] Collected: 09/29/20 0322     Updated: 09/29/20 0325    Narrative:      LEFT FEMUR, 9/29/2020      HISTORY:    89-year-old female with left hip dislocation. Attempted reduction. Loosening of the endoprosthesis. Assess for femur fracture.      TECHNIQUE:  AP lateral radiographs of the left femur obtained portably.      FINDINGS:  As on the earlier study, the proximal femoral endoprosthesis is rotated externally within the femur, implying loosening and displacement. No visible periprosthetic fracture.    Left hip dislocation with marked superior migration of the femur in relation to the acetabulum.    No visible fracture of the mid or distal femur.      Impression:      1. Loosening and rotation of the proximal femoral endoprosthesis within the femur.  Backspace  2. No visible periprosthetic fracture. Distal femur is also negative.  X-rays  3. Marked superior hip dislocation.    Signer Name: Arpan Fine MD   Signed: 9/29/2020 3:22 AM   Workstation Name: [email or web address]-    [facility]    XR Hip With or Without Pelvis 2 - 3 View Left [[id number]] Collected: 09/29/20 0233      Updated: 09/29/20 0233    Narrative:      PELVIS, 9/29/2020 (01:52)      HISTORY:    89-year-old female in the ED with left hip dislocation tonight. Attempted post reduction.      TECHNIQUE:  Single AP postreduction image of the pelvis was obtained.      FINDINGS:  The left proximal femoral endoprosthesis now appears externally rotated in relation to the femur, implying that the femoral stem component is no longer seated.    There is increasing superior displacement of the dislocated hip joint.    No visible pelvis fracture.      Impression:      1.  External rotation of the femoral endoprosthesis within the femur as noted above.  2.  Continued hip dislocation with increased superior migration of the femur.    Signer Name: Arpan Fine MD   Signed: 9/29/2020 2:31 AM   Workstation Name: [facility]-    [facility]    XR Hip With or Without Pelvis 2 - 3 View Left [[id number]] Collected: 09/28/20 2233     Updated: 09/28/20 2236    Narrative:      LEFT HIP, 9/28/2020      HISTORY:    89-year-old female postop left hip arthroplasty on 9/18/2020 presenting to the ED with left leg deformity after a fall.      TECHNIQUE:  Three-view left hip series.      FINDINGS:  Images show complete superior left hip dislocation with the head of the proximal femoral endoprosthesis at the level of the anterior inferior iliac spine. There is a tiny linear bone fragment superimposed over the posterior margin of the acetabulum which  may represent small impaction fracture.    No other fracture of the pelvis. The endoprosthesis appears well seated.      Impression:      1.  Left hip arthroplasty with complete superior left hip dislocation.  2.  Probable tiny impaction fracture fragment superimposed over the posterior margin of the acetabulum.  3.  The endoprosthesis appears well seated. No visible periprosthetic femur fracture.    Signer Name: Arpan Fine MD   Signed: 9/28/2020 10:33 PM   Workstation Name:  [email or web address]    [facility]                         Plan for Follow-up of Pending Labs/Results: Hosp [facility]  Pending Labs     Order Current Status    Urine Culture - Urine, Urine, Clean Catch In process        Discharge Details        Discharge Medications      New Medications      Instructions Start Date   cefuroxime 250 MG tablet  Commonly known as: Ceftin   250 mg, Oral, 2 Times Daily      HYDROcodone-acetaminophen 5-325 MG per tablet  Commonly known as: NORCO   1 tablet, Oral, Every 4 Hours PRN         Continue These Medications      Instructions Start Date   acetaminophen 500 MG tablet  Commonly known as: TYLENOL   500 mg, Oral, Every 6 Hours      amLODIPine 2.5 MG tablet  Commonly known as: NORVASC   2.5 mg, Oral, Every 24 Hours Scheduled      Cyanocobalamin 5000 MCG tablet dispersible   take 1 tablet daily      docusate sodium 250 MG capsule  Commonly known as: COLACE   2 capsules, Oral, Daily      donepezil 5 MG tablet  Commonly known as: ARICEPT   TAKE 1 TABLET EVERY NIGHT      enoxaparin 30 MG/0.3ML solution syringe  Commonly known as: LOVENOX   30 mg, Subcutaneous, Daily      gabapentin 100 MG capsule  Commonly known as: NEURONTIN   100 mg, Oral, Nightly      levothyroxine 100 MCG tablet  Commonly known as: SYNTHROID, LEVOTHROID   TAKE 1 TABLET DAILY      Magnesium Citrate 100 MG tablet   take 1 tablet every evening      magnesium hydroxide 2400 MG/10ML suspension suspension  Commonly known as: MILK OF MAGNESIA   10 mL, Oral, Daily PRN      omeprazole 40 MG capsule  Commonly known as: priLOSEC   40 mg, Oral, Daily      polyethylene glycol packet  Commonly known as: MIRALAX   17 g, Oral, Daily      PreserVision AREDS 2+Multi Vit capsule   Take one capsule by oral route once a day      Selenium 200 MCG tablet   1 tablet, Oral, Daily      sertraline 50 MG tablet  Commonly known as: ZOLOFT   TAKE ONE-HALF (1/2) TABLET DAILY      solifenacin 10 MG tablet  Commonly known as: VESICARE   10  mg, Oral, Daily      Vitamin D3 50 MCG (2000 UT) capsule   1 capsule by mouth  daily         Stop These Medications    traMADol 50 MG tablet  Commonly known as: ULTRAM            Allergies   Allergen Reactions   • Codeine    • Penicillins          Discharge Disposition:  Home or Self Care    Diet:  Hospital:  Diet Order   Procedures   • Diet Regular       Activity:      Restrictions or Other Recommendations:  WBAT. Posterior hip precautions. Lovenox for DVT PPX for 4 weeks.       CODE STATUS:    Code Status and Medical Interventions:   Ordered at: 09/29/20 0337     Code Status:    No CPR     Medical Interventions (Level of Support Prior to Arrest):    Full     Comments:    DNR/ DNI       No future appointments.    Additional Instructions for the Follow-ups that You Need to Schedule     Discharge Follow-up with Specialty: Dr. Hill; 3 Weeks   As directed      Specialty: Dr. Hill    Follow Up: 3 Weeks                     Rae Barney II, DO  10/01/20      Time Spent on Discharge:  I spent  33  minutes on this discharge activity which included: face-to-face encounter with the patient, reviewing the data in the system, coordination of the care with the nursing staff as well as consultants, documentation, and entering orders.

## 2020-10-01 NOTE — PROGRESS NOTES
Case Management Discharge Note      Final Note: Plan [facility]. [facility] ambulance is scheduled for 12:15. [facility] is on chart. Nurse to call report to [phone] and fax discharge summary to [phone]. Please send any hard scripts for medications with the patient in transfer packet.         Selected Continued Care - Admitted Since 9/28/2020     Destination Coordination complete    Service Provider Selected Services Address Phone Fax    [facility]  Skilled Nursing [address], [place] [address] [phone]          Durable Medical Equipment    No services have been selected for the patient.              Dialysis/Infusion    No services have been selected for the patient.              Home Medical Care    No services have been selected for the patient.              Therapy    No services have been selected for the patient.              Community Resources    No services have been selected for the patient.                Selected Continued Care - Prior Encounters Includes selections from prior encounters from 6/30/2020 to 10/1/2020    Discharged on 9/22/2020 Admission date: 9/17/2020 - Discharge disposition: Skilled Nursing Facility (DC - External)    Destination     Service Provider Selected Services Address Phone Fax    [facility]  Skilled Nursing [address], [place] [facility] [place] [address] [phone]                    Transportation Services  Ambulance: [facility]    Final Discharge Disposition Code: 03 - skilled nursing facility (SNF)

## 2020-10-01 NOTE — PROGRESS NOTES
"/65 (BP Location: Right arm, Patient Position: Lying)   Pulse 85   Temp 97.9 °F (36.6 °C) (Oral)   Resp 16   Ht 165.1 cm (65\")   Wt 45.4 kg (100 lb)   SpO2 91%   BMI 16.64 kg/m²     Lab Results (last 24 hours)     Procedure Component Value Units Date/Time    Urine Culture - Urine, Urine, Clean Catch [[id number]]  (Abnormal) Collected: 09/30/20 0030    Specimen: Urine, Clean Catch Updated: 10/01/20 1025     Urine Culture >100,000 CFU/mL Gram Negative Bacilli    Basic Metabolic Panel [[id number]]  (Abnormal) Collected: 10/01/20 0719    Specimen: Blood Updated: 10/01/20 0854     Glucose 103 mg/dL      BUN 16 mg/dL      Creatinine 0.53 mg/dL      Sodium 131 mmol/L      Potassium 3.0 mmol/L      Comment: Slight hemolysis detected by analyzer. Results may be affected.        Chloride 98 mmol/L      CO2 25.0 mmol/L      Calcium 7.8 mg/dL      eGFR Non African Amer 107 mL/min/1.73      BUN/Creatinine Ratio 30.2     Anion Gap 8.0 mmol/L     Narrative:      GFR Normal >60  Chronic Kidney Disease <60  Kidney Failure <15      CBC (No Diff) [[id number]]  (Abnormal) Collected: 10/01/20 0719    Specimen: Blood Updated: 10/01/20 0836     WBC 11.83 10*3/mm3      RBC 2.53 10*6/mm3      Hemoglobin 7.9 g/dL      Hematocrit 23.8 %      MCV 94.1 fL      MCH 31.2 pg      MCHC 33.2 g/dL      RDW 15.3 %      RDW-SD 51.3 fl      MPV 9.2 fL      Platelets 375 10*3/mm3           Imaging Results (Last 24 Hours)     ** No results found for the last 24 hours. **          Patient Care Team:  Sandra Adkins MD as PCP - General  Sandra Adkins MD as PCP - Family Medicine  Rob Woods MD as Consulting Physician (Orthopedic Surgery)  Shade Nesbitt MD as Consulting Physician (Ophthalmology)    SUBJECTIVE:  More alert today.  Working with PT, took a couple of steps at bedside.  Granddaughter with her this morning.    PHYSICAL EXAM  Left hip dressing is clean, dry and intact  Thigh and calf soft and nontender  Neurovascular " intact distally       Hypercholesteremia    Benign essential hypertension    At high risk for falls    COPD (chronic obstructive pulmonary disease) with chronic bronchitis (CMS/[facility])    Gastroesophageal reflux disease without esophagitis    Hypothyroidism (acquired)    Dementia (CMS/[facility])      PLAN / DISPOSITION:  89-year-old female postop day 2 status post left hip hemiarthroplasty revision  -Mobilize with therapy as tolerated with posterior hip precautions.  -With her dementia and history of dislocation recommend she keep the hip abduction pillow or knee immobilizer on at all times when in bed or sleeping.  -continue Lovenox for DVT prophylaxis 4 weeks postop  -Okay with me to transfer back to [facility] for rehab today as planned  -Follow-up in 3 weeks    Clarence Hill MD  10/01/20  12:05 EDT

## 2020-10-01 NOTE — OUTREACH NOTE
Prep Survey      Responses   [facility] facility patient discharged from?  [facility]   Is LACE score < 7 ?  No   Eligibility  Not Eligible   What are the reasons patient is not eligible?  [facility]   Does the patient have one of the following disease processes/diagnoses(primary or secondary)?  Total Joint Replacement   Prep survey completed?  Yes          Daysi Morris RN

## 2020-10-01 NOTE — PLAN OF CARE
Patient remains confused to time and place. She is pleasant. VSS. Hip dressing CDI. Tolerating po although taking small amounts. Minimal complaint of pain. Tylenol given PO PRN. Plan is for her to return to [facility] today if medically ready. Ambulance at 12:15 to transport.

## 2020-10-01 NOTE — PLAN OF CARE
Problem: Adult Inpatient Plan of Care  Goal: Plan of Care Review  Recent Flowsheet Documentation  Taken 10/1/2020 1100 by Yesica Moreno, PT  Progress: improving  Plan of Care Reviewed With:   patient   grandchild(teresa)  Outcome Summary:  Pt. demonstrates min. improvement w/ mobility.  She continues to be limited by weakness and fear of falling.  Pt. performed bed mobility w/ max A x 2, STS transfers x 3 reps w/ mod A x 2, and ambulated 2ft w/ RW and mod A x 2.  Pt. participated in supine LE TherEx w/ good effort.  PT continues to recommend SNF rehab at D/C for best outcome.

## 2020-10-02 LAB — BACTERIA SPEC AEROBE CULT: ABNORMAL

## (undated) DEVICE — SST TWIST DRILL, STANDARD, 2.4MM DIA. X 127MM: Brand: MICROAIRE®

## (undated) DEVICE — GLV SURG PREMIERPRO MIC LTX PF SZ8 BRN

## (undated) DEVICE — CVR HNDL LIGHT RIGID

## (undated) DEVICE — SYS SKIN CLS DERMABOND PRINEO W/22CM MESH TP

## (undated) DEVICE — PILLW ABD MD

## (undated) DEVICE — HEWSON SUTURE RETRIEVER: Brand: HEWSON SUTURE RETRIEVER

## (undated) DEVICE — PK HIP TOTL UNIV 10

## (undated) DEVICE — ANTIBACTERIAL UNDYED BRAIDED (POLYGLACTIN 910), SYNTHETIC ABSORBABLE SUTURE: Brand: COATED VICRYL

## (undated) DEVICE — INTENT TO BE USED WITH SUTURE MATERIAL FOR TISSUE CLOSURE: Brand: RICHARD-ALLAN® NEEDLE 1/2 CIRCLE TAPER

## (undated) DEVICE — FEMORAL CANAL BRUSH, IRRIGATION/SUCTION

## (undated) DEVICE — PULLOVER TOGA, 2X LARGE: Brand: FLYTE, SURGICOOL

## (undated) DEVICE — BNDG ELAS CO-FLEX SLF ADHR 6IN 5YD LF STRL